# Patient Record
Sex: FEMALE | Race: WHITE | NOT HISPANIC OR LATINO | Employment: OTHER | URBAN - METROPOLITAN AREA
[De-identification: names, ages, dates, MRNs, and addresses within clinical notes are randomized per-mention and may not be internally consistent; named-entity substitution may affect disease eponyms.]

---

## 2017-09-26 ENCOUNTER — APPOINTMENT (OUTPATIENT)
Dept: WOMENS IMAGING | Facility: HOSPITAL | Age: 72
End: 2017-09-26

## 2017-09-26 PROCEDURE — G0202 SCR MAMMO BI INCL CAD: HCPCS | Performed by: RADIOLOGY

## 2017-09-26 PROCEDURE — MDREVIEWSP: Performed by: RADIOLOGY

## 2017-09-26 PROCEDURE — 77063 BREAST TOMOSYNTHESIS BI: CPT | Performed by: RADIOLOGY

## 2017-10-30 ENCOUNTER — HOSPITAL ENCOUNTER (OUTPATIENT)
Dept: CARDIOLOGY | Facility: HOSPITAL | Age: 72
Discharge: HOME | End: 2017-10-30
Attending: INTERNAL MEDICINE | Admitting: INTERNAL MEDICINE

## 2017-11-03 ENCOUNTER — HOSPITAL ENCOUNTER (OUTPATIENT)
Dept: CARDIOLOGY | Facility: HOSPITAL | Age: 72
Discharge: HOME | End: 2017-11-03
Attending: INTERNAL MEDICINE | Admitting: INTERNAL MEDICINE

## 2017-11-22 ENCOUNTER — HOSPITAL ENCOUNTER (OUTPATIENT)
Dept: MAMMOGRAPHY | Facility: HOSPITAL | Age: 72
Discharge: HOME | End: 2017-11-22
Attending: INTERNAL MEDICINE | Admitting: INTERNAL MEDICINE

## 2018-05-03 ENCOUNTER — HOSPITAL ENCOUNTER (OUTPATIENT)
Dept: CARDIOLOGY | Facility: HOSPITAL | Age: 73
Discharge: HOME | End: 2018-05-03
Attending: INTERNAL MEDICINE | Admitting: INTERNAL MEDICINE

## 2018-05-07 ENCOUNTER — HOSPITAL ENCOUNTER (OUTPATIENT)
Dept: PREADMISSION TESTING | Facility: HOSPITAL | Age: 73
Discharge: HOME | End: 2018-05-07
Attending: INTERNAL MEDICINE | Admitting: INTERNAL MEDICINE

## 2018-05-07 LAB
ANION GAP SERPL CALC-SCNC: 12.1 MMOL/L (ref 10–20)
BASOPHILS # BLD AUTO: 0 10*3/UL (ref 0–0.2)
BASOPHILS NFR BLD AUTO: 0 % (ref 0–2)
BUN SERPL-MCNC: 13 MG/DL (ref 8–20)
BUN/CREAT SERPL: 16.3 (ref 5.4–26.2)
CALCIUM SERPL-MCNC: 8.7 MG/DL (ref 8.9–10.3)
CHLORIDE SERPL-SCNC: 102 MMOL/L (ref 101–111)
CONV CO2: 27 MMOL/L (ref 22–32)
CREAT UR-MCNC: 0.8 MG/DL (ref 0.4–1)
DIFFERENTIAL METHOD BLD: (no result)
EOSINOPHIL # BLD AUTO: 0.1 10*3/UL (ref 0–0.3)
EOSINOPHIL # BLD AUTO: 2 % (ref 0–3)
ERYTHROCYTE [DISTWIDTH] IN BLOOD BY AUTOMATED COUNT: 14.2 % (ref 11.5–14.5)
GLUCOSE SERPL-MCNC: 97 MG/DL (ref 65–99)
HCT VFR BLD AUTO: 43 % (ref 35–49)
HGB BLD-MCNC: 14.5 G/DL (ref 12–15)
INR PPP: 1.1
LYMPHOCYTES # BLD AUTO: 1.2 10*3/UL (ref 0.8–4.8)
LYMPHOCYTES NFR BLD AUTO: 24 % (ref 18–42)
MCH RBC QN AUTO: 31.3 PG (ref 26–32)
MCHC RBC AUTO-ENTMCNC: 33.7 G/DL (ref 32–36)
MCV RBC AUTO: 92.9 FL (ref 80–94)
MONOCYTES # BLD AUTO: 0.4 10*3/UL (ref 0.1–1.3)
MONOCYTES NFR BLD AUTO: 7 % (ref 2–11)
NEUTROPHILS # BLD AUTO: 3.3 10*3/UL (ref 2.3–8.6)
NEUTROPHILS NFR BLD AUTO: 67 % (ref 50–75)
NRBC BLD AUTO-RTO: 0 /100{WBCS}
NRBC/RBC NFR BLD MANUAL: 0 10*3/UL
PLATELET # BLD AUTO: 91 10*3/UL (ref 150–450)
PMV BLD AUTO: 11 FL (ref 7.4–10.4)
POTASSIUM SERPL-SCNC: 4.1 MMOL/L (ref 3.6–5.1)
PROTHROMBIN TIME: 11.5 SEC (ref 9.6–11.7)
RBC # BLD AUTO: 4.63 10*6/UL (ref 4–5.4)
SODIUM SERPL-SCNC: 137 MMOL/L (ref 136–144)
WBC # BLD AUTO: 5 10*3/UL (ref 4.5–11.5)

## 2018-08-20 ENCOUNTER — HOSPITAL ENCOUNTER (OUTPATIENT)
Dept: LAB | Facility: HOSPITAL | Age: 73
Discharge: HOME | End: 2018-08-20
Attending: NURSE PRACTITIONER | Admitting: NURSE PRACTITIONER

## 2018-08-20 LAB
ALBUMIN SERPL-MCNC: 4.2 G/DL (ref 3.5–4.8)
ALBUMIN/GLOB SERPL: 1.5 {RATIO} (ref 1–1.7)
ALP SERPL-CCNC: 51 IU/L (ref 32–91)
ALT SERPL-CCNC: 19 IU/L (ref 14–54)
ANION GAP SERPL CALC-SCNC: 13.2 MMOL/L (ref 10–20)
AST SERPL-CCNC: 27 IU/L (ref 15–41)
BILIRUB SERPL-MCNC: 1.1 MG/DL (ref 0.3–1.2)
BUN SERPL-MCNC: 10 MG/DL (ref 8–20)
BUN/CREAT SERPL: 12.5 (ref 5.4–26.2)
C3 SERPL-MCNC: 80 MG/DL (ref 79–152)
C4 SERPL-MCNC: 13 MG/DL (ref 18–55)
CALCIUM SERPL-MCNC: 8.9 MG/DL (ref 8.9–10.3)
CCP IGG ANTIBODIES: <0.4 U/ML
CHLORIDE SERPL-SCNC: 104 MMOL/L (ref 101–111)
CONV CO2: 23 MMOL/L (ref 22–32)
CONV TOTAL PROTEIN: 7 G/DL (ref 6.1–7.9)
CREAT UR-MCNC: 0.8 MG/DL (ref 0.4–1)
CRP SERPL-MCNC: 0.1 MG/DL (ref 0–0.7)
ERYTHROCYTE [DISTWIDTH] IN BLOOD BY AUTOMATED COUNT: 14.2 % (ref 11.5–14.5)
ERYTHROCYTE [SEDIMENTATION RATE] IN BLOOD BY WESTERGREN METHOD: 13 MM/HR (ref 0–30)
GLOBULIN UR ELPH-MCNC: 2.8 G/DL (ref 2.5–3.8)
GLUCOSE SERPL-MCNC: 94 MG/DL (ref 65–99)
HCT VFR BLD AUTO: 41.7 % (ref 35–49)
HGB BLD-MCNC: 14.3 G/DL (ref 12–15)
MCH RBC QN AUTO: 32 PG (ref 26–32)
MCHC RBC AUTO-ENTMCNC: 34.2 G/DL (ref 32–36)
MCV RBC AUTO: 93.3 FL (ref 80–94)
PLATELET # BLD AUTO: 106 10*3/UL (ref 150–450)
PMV BLD AUTO: 10.9 FL (ref 7.4–10.4)
POTASSIUM SERPL-SCNC: 4.2 MMOL/L (ref 3.6–5.1)
RBC # BLD AUTO: 4.47 10*6/UL (ref 4–5.4)
SODIUM SERPL-SCNC: 136 MMOL/L (ref 136–144)
WBC # BLD AUTO: 5.4 10*3/UL (ref 4.5–11.5)

## 2018-08-22 LAB
ANA SER QL IA: NORMAL

## 2018-09-26 ENCOUNTER — HOSPITAL ENCOUNTER (OUTPATIENT)
Dept: CARDIOLOGY | Facility: HOSPITAL | Age: 73
Discharge: HOME | End: 2018-09-26
Attending: NURSE PRACTITIONER | Admitting: NURSE PRACTITIONER

## 2018-10-18 ENCOUNTER — HOSPITAL ENCOUNTER (OUTPATIENT)
Dept: LAB | Facility: HOSPITAL | Age: 73
Discharge: HOME | End: 2018-10-18
Attending: NURSE PRACTITIONER | Admitting: NURSE PRACTITIONER

## 2018-10-18 LAB
ALBUMIN SERPL-MCNC: 4.1 G/DL (ref 3.5–4.8)
ALBUMIN/GLOB SERPL: 1.6 {RATIO} (ref 1–1.7)
ALP SERPL-CCNC: 60 IU/L (ref 32–91)
ALT SERPL-CCNC: 19 IU/L (ref 14–54)
ANION GAP SERPL CALC-SCNC: 11.6 MMOL/L (ref 10–20)
AST SERPL-CCNC: 23 IU/L (ref 15–41)
BASOPHILS # BLD AUTO: 0 10*3/UL (ref 0–0.2)
BASOPHILS NFR BLD AUTO: 0 % (ref 0–2)
BILIRUB SERPL-MCNC: 1.2 MG/DL (ref 0.3–1.2)
BILIRUB UR QL STRIP: NEGATIVE MG/DL
BUN SERPL-MCNC: 11 MG/DL (ref 8–20)
BUN/CREAT SERPL: 13.8 (ref 5.4–26.2)
CALCIUM SERPL-MCNC: 8.8 MG/DL (ref 8.9–10.3)
CASTS URNS QL MICRO: ABNORMAL /[LPF]
CHLORIDE SERPL-SCNC: 101 MMOL/L (ref 101–111)
COLOR UR: ABNORMAL
CONV BACTERIA IN URINE MICRO: NEGATIVE
CONV CLARITY OF URINE: CLEAR
CONV CO2: 29 MMOL/L (ref 22–32)
CONV HYALINE CASTS IN URINE MICRO: 2 /[LPF] (ref 0–5)
CONV PROTEIN IN URINE BY AUTOMATED TEST STRIP: NEGATIVE MG/DL
CONV SMALL ROUND CELLS: ABNORMAL /[HPF]
CONV TOTAL PROTEIN: 6.6 G/DL (ref 6.1–7.9)
CONV UROBILINOGEN IN URINE BY AUTOMATED TEST STRIP: 1 MG/DL
CREAT UR-MCNC: 0.8 MG/DL (ref 0.4–1)
CRP SERPL-MCNC: 0.15 MG/DL (ref 0–0.7)
CULTURE INDICATED?: ABNORMAL
DIFFERENTIAL METHOD BLD: (no result)
EOSINOPHIL # BLD AUTO: 0.2 10*3/UL (ref 0–0.3)
EOSINOPHIL # BLD AUTO: 4 % (ref 0–3)
ERYTHROCYTE [DISTWIDTH] IN BLOOD BY AUTOMATED COUNT: 14.4 % (ref 11.5–14.5)
GLOBULIN UR ELPH-MCNC: 2.5 G/DL (ref 2.5–3.8)
GLUCOSE SERPL-MCNC: 91 MG/DL (ref 65–99)
GLUCOSE UR QL: NEGATIVE MG/DL
HCT VFR BLD AUTO: 41.7 % (ref 35–49)
HGB BLD-MCNC: 13.9 G/DL (ref 12–15)
HGB UR QL STRIP: NEGATIVE
KETONES UR QL STRIP: NEGATIVE MG/DL
LEUKOCYTE ESTERASE UR QL STRIP: NEGATIVE
LYMPHOCYTES # BLD AUTO: 1 10*3/UL (ref 0.8–4.8)
LYMPHOCYTES NFR BLD AUTO: 20 % (ref 18–42)
MCH RBC QN AUTO: 31.3 PG (ref 26–32)
MCHC RBC AUTO-ENTMCNC: 33.3 G/DL (ref 32–36)
MCV RBC AUTO: 94 FL (ref 80–94)
MONOCYTES # BLD AUTO: 0.4 10*3/UL (ref 0.1–1.3)
MONOCYTES NFR BLD AUTO: 8 % (ref 2–11)
NEUTROPHILS # BLD AUTO: 3.5 10*3/UL (ref 2.3–8.6)
NEUTROPHILS NFR BLD AUTO: 68 % (ref 50–75)
NITRITE UR QL STRIP: NEGATIVE
NRBC BLD AUTO-RTO: 0 /100{WBCS}
NRBC/RBC NFR BLD MANUAL: 0 10*3/UL
PH UR STRIP.AUTO: 6.5 [PH] (ref 4.5–8)
PLATELET # BLD AUTO: 98 10*3/UL (ref 150–450)
PMV BLD AUTO: 12 FL (ref 7.4–10.4)
POTASSIUM SERPL-SCNC: 3.6 MMOL/L (ref 3.6–5.1)
RBC # BLD AUTO: 4.44 10*6/UL (ref 4–5.4)
RBC #/AREA URNS HPF: 1 /[HPF] (ref 0–3)
SODIUM SERPL-SCNC: 138 MMOL/L (ref 136–144)
SP GR UR: 1.02 (ref 1–1.03)
SPERM URNS QL MICRO: ABNORMAL /[HPF]
SQUAMOUS SPT QL MICRO: 3 /[HPF] (ref 0–5)
UNIDENT CRYS URNS QL MICRO: ABNORMAL /[HPF]
WBC # BLD AUTO: 5 10*3/UL (ref 4.5–11.5)
WBC #/AREA URNS HPF: 2 /[HPF] (ref 0–5)
YEAST SPEC QL WET PREP: ABNORMAL /[HPF]

## 2018-10-19 LAB
25(OH)D3 SERPL-MCNC: 25 NG/ML (ref 30–100)
C3 SERPL-MCNC: 78 MG/DL (ref 79–152)
C4 SERPL-MCNC: 13.1 MG/DL (ref 18–55)

## 2018-11-07 ENCOUNTER — HOSPITAL ENCOUNTER (OUTPATIENT)
Dept: ONCOLOGY | Facility: CLINIC | Age: 73
Setting detail: INFUSION SERIES
Discharge: HOME | End: 2018-11-07
Attending: INTERNAL MEDICINE | Admitting: INTERNAL MEDICINE

## 2018-11-07 ENCOUNTER — HOSPITAL ENCOUNTER (OUTPATIENT)
Dept: ONCOLOGY | Facility: HOSPITAL | Age: 73
Discharge: HOME | End: 2018-11-07
Attending: INTERNAL MEDICINE | Admitting: INTERNAL MEDICINE

## 2018-11-07 ENCOUNTER — CLINICAL SUPPORT (OUTPATIENT)
Dept: ONCOLOGY | Facility: HOSPITAL | Age: 73
End: 2018-11-07

## 2018-11-07 NOTE — PROGRESS NOTES
PATIENTS ONCOLOGY RECORD LOCATED IN [facility]      Subjective     Name:  LALO DELGADO     Date:  2018  Address:  [facility] [address] [place] [address]  Home: [phone]  :  1945 AGE:  72 y.o.        RECORDS OBTAINED:  Patients Oncology Record is located in [facility]

## 2018-12-24 ENCOUNTER — HOSPITAL ENCOUNTER (OUTPATIENT)
Dept: LAB | Facility: HOSPITAL | Age: 73
Discharge: HOME | End: 2018-12-24
Attending: NURSE PRACTITIONER | Admitting: NURSE PRACTITIONER

## 2018-12-24 LAB
25(OH)D3 SERPL-MCNC: 44 NG/ML (ref 30–100)
ALBUMIN SERPL-MCNC: 4.2 G/DL (ref 3.5–4.8)
ALBUMIN/GLOB SERPL: 1.8 {RATIO} (ref 1–1.7)
ALP SERPL-CCNC: 52 IU/L (ref 32–91)
ALT SERPL-CCNC: 18 IU/L (ref 14–54)
ANION GAP SERPL CALC-SCNC: 12.4 MMOL/L (ref 10–20)
AST SERPL-CCNC: 23 IU/L (ref 15–41)
BASOPHILS # BLD AUTO: 0 10*3/UL (ref 0–0.2)
BASOPHILS NFR BLD AUTO: 1 % (ref 0–2)
BILIRUB SERPL-MCNC: 1 MG/DL (ref 0.3–1.2)
BUN SERPL-MCNC: 11 MG/DL (ref 8–20)
BUN/CREAT SERPL: 13.8 (ref 5.4–26.2)
C3 SERPL-MCNC: 84 MG/DL (ref 79–152)
C4 SERPL-MCNC: 13.5 MG/DL (ref 18–55)
CALCIUM SERPL-MCNC: 8.9 MG/DL (ref 8.9–10.3)
CHLORIDE SERPL-SCNC: 104 MMOL/L (ref 101–111)
CONV CO2: 26 MMOL/L (ref 22–32)
CONV TOTAL PROTEIN: 6.6 G/DL (ref 6.1–7.9)
CREAT UR-MCNC: 0.8 MG/DL (ref 0.4–1)
DIFFERENTIAL METHOD BLD: (no result)
EOSINOPHIL # BLD AUTO: 0.1 10*3/UL (ref 0–0.3)
EOSINOPHIL # BLD AUTO: 3 % (ref 0–3)
ERYTHROCYTE [DISTWIDTH] IN BLOOD BY AUTOMATED COUNT: 14.2 % (ref 11.5–14.5)
GLOBULIN UR ELPH-MCNC: 2.4 G/DL (ref 2.5–3.8)
GLUCOSE SERPL-MCNC: 94 MG/DL (ref 65–99)
HCT VFR BLD AUTO: 43.5 % (ref 35–49)
HGB BLD-MCNC: 14.4 G/DL (ref 12–15)
LYMPHOCYTES # BLD AUTO: 0.8 10*3/UL (ref 0.8–4.8)
LYMPHOCYTES NFR BLD AUTO: 19 % (ref 18–42)
MCH RBC QN AUTO: 31.4 PG (ref 26–32)
MCHC RBC AUTO-ENTMCNC: 33 G/DL (ref 32–36)
MCV RBC AUTO: 95 FL (ref 80–94)
MONOCYTES # BLD AUTO: 0.3 10*3/UL (ref 0.1–1.3)
MONOCYTES NFR BLD AUTO: 8 % (ref 2–11)
NEUTROPHILS # BLD AUTO: 2.8 10*3/UL (ref 2.3–8.6)
NEUTROPHILS NFR BLD AUTO: 69 % (ref 50–75)
NRBC BLD AUTO-RTO: 0 /100{WBCS}
NRBC/RBC NFR BLD MANUAL: 0 10*3/UL
PLATELET # BLD AUTO: 88 10*3/UL (ref 150–450)
PMV BLD AUTO: 11.7 FL (ref 7.4–10.4)
POTASSIUM SERPL-SCNC: 4.4 MMOL/L (ref 3.6–5.1)
RBC # BLD AUTO: 4.58 10*6/UL (ref 4–5.4)
SODIUM SERPL-SCNC: 138 MMOL/L (ref 136–144)
WBC # BLD AUTO: 4 10*3/UL (ref 4.5–11.5)

## 2019-04-24 ENCOUNTER — HOSPITAL ENCOUNTER (OUTPATIENT)
Dept: LAB | Facility: HOSPITAL | Age: 74
Discharge: HOME | End: 2019-04-24
Attending: NURSE PRACTITIONER | Admitting: NURSE PRACTITIONER

## 2019-04-24 LAB
ALBUMIN SERPL-MCNC: 4.1 G/DL (ref 3.5–4.8)
ALBUMIN SERPL-MCNC: 4.2 G/DL (ref 3.5–4.8)
ALBUMIN/GLOB SERPL: 1.5 {RATIO} (ref 1–1.7)
ALP SERPL-CCNC: 65 IU/L (ref 32–91)
ALPHA1 GLOB FLD ELPH-MCNC: 0.2 GM/DL (ref 0.1–0.4)
ALPHA2 GLOB SERPL ELPH-MCNC: 0.7 GM/DL (ref 0.5–1)
ALT SERPL-CCNC: 21 IU/L (ref 14–54)
ANION GAP SERPL CALC-SCNC: 11.8 MMOL/L (ref 10–20)
AST SERPL-CCNC: 25 IU/L (ref 15–41)
B-GLOBULIN SERPL ELPH-MCNC: 0.9 GM/DL (ref 0.7–1.4)
BASOPHILS # BLD AUTO: 0 10*3/UL (ref 0–0.2)
BASOPHILS NFR BLD AUTO: 0 % (ref 0–2)
BILIRUB SERPL-MCNC: 1.2 MG/DL (ref 0.3–1.2)
BILIRUB UR QL STRIP: ABNORMAL
BILIRUB UR QL STRIP: ABNORMAL MG/DL
BUN SERPL-MCNC: 10 MG/DL (ref 8–20)
BUN/CREAT SERPL: 12.5 (ref 5.4–26.2)
C3 SERPL-MCNC: 96 MG/DL (ref 79–152)
C4 SERPL-MCNC: 14.5 MG/DL (ref 18–55)
CALCIUM SERPL-MCNC: 9.2 MG/DL (ref 8.9–10.3)
CASTS URNS QL MICRO: ABNORMAL /[LPF]
CHLORIDE SERPL-SCNC: 104 MMOL/L (ref 101–111)
COLOR UR: ABNORMAL
CONV BACTERIA IN URINE MICRO: NEGATIVE
CONV CLARITY OF URINE: CLEAR
CONV CO2: 26 MMOL/L (ref 22–32)
CONV HYALINE CASTS IN URINE MICRO: 2 /[LPF] (ref 0–5)
CONV PROTEIN IN URINE BY AUTOMATED TEST STRIP: NEGATIVE MG/DL
CONV SMALL ROUND CELLS: ABNORMAL /[HPF]
CONV TOTAL PROTEIN: 6.9 G/DL (ref 6.1–7.9)
CONV TOTAL PROTEIN: 7 G/DL (ref 6.1–7.9)
CONV UROBILINOGEN IN URINE BY AUTOMATED TEST STRIP: 1 MG/DL
CREAT UR-MCNC: 0.8 MG/DL (ref 0.4–1)
CRP SERPL-MCNC: 0.18 MG/DL (ref 0–0.7)
CULTURE INDICATED?: ABNORMAL
DIFFERENTIAL METHOD BLD: (no result)
EOSINOPHIL # BLD AUTO: 0.2 10*3/UL (ref 0–0.3)
EOSINOPHIL # BLD AUTO: 4 % (ref 0–3)
ERYTHROCYTE [DISTWIDTH] IN BLOOD BY AUTOMATED COUNT: 13.7 % (ref 11.5–14.5)
ERYTHROCYTE [SEDIMENTATION RATE] IN BLOOD BY WESTERGREN METHOD: 15 MM/HR (ref 0–30)
GAMMA GLOB SERPL ELPH-MCNC: 1 GM/DL (ref 0.6–1.6)
GLOBULIN UR ELPH-MCNC: 2.8 G/DL (ref 2.5–3.8)
GLUCOSE SERPL-MCNC: 94 MG/DL (ref 65–99)
GLUCOSE UR QL: NEGATIVE MG/DL
HCT VFR BLD AUTO: 43.1 % (ref 35–49)
HGB BLD-MCNC: 14.5 G/DL (ref 12–15)
HGB UR QL STRIP: NEGATIVE
INSULIN SERPL-ACNC: NORMAL U[IU]/ML
KETONES UR QL STRIP: NEGATIVE MG/DL
LEUKOCYTE ESTERASE UR QL STRIP: ABNORMAL
LYMPHOCYTES # BLD AUTO: 0.8 10*3/UL (ref 0.8–4.8)
LYMPHOCYTES NFR BLD AUTO: 17 % (ref 18–42)
MCH RBC QN AUTO: 30.8 PG (ref 26–32)
MCHC RBC AUTO-ENTMCNC: 33.6 G/DL (ref 32–36)
MCV RBC AUTO: 91.7 FL (ref 80–94)
MONOCYTES # BLD AUTO: 0.4 10*3/UL (ref 0.1–1.3)
MONOCYTES NFR BLD AUTO: 7 % (ref 2–11)
NEUTROPHILS # BLD AUTO: 3.6 10*3/UL (ref 2.3–8.6)
NEUTROPHILS NFR BLD AUTO: 72 % (ref 50–75)
NITRITE UR QL STRIP: NEGATIVE
NRBC BLD AUTO-RTO: 0 /100{WBCS}
NRBC/RBC NFR BLD MANUAL: 0 10*3/UL
PH UR STRIP.AUTO: 6 [PH] (ref 4.5–8)
PLATELET # BLD AUTO: 115 10*3/UL (ref 150–450)
PMV BLD AUTO: 10.5 FL (ref 7.4–10.4)
POTASSIUM SERPL-SCNC: 3.8 MMOL/L (ref 3.6–5.1)
RBC # BLD AUTO: 4.7 10*6/UL (ref 4–5.4)
RBC #/AREA URNS HPF: 2 /[HPF] (ref 0–3)
SODIUM SERPL-SCNC: 138 MMOL/L (ref 136–144)
SP GR UR: 1.02 (ref 1–1.03)
SPERM URNS QL MICRO: ABNORMAL /[HPF]
SQUAMOUS SPT QL MICRO: 4 /[HPF] (ref 0–5)
UNIDENT CRYS URNS QL MICRO: ABNORMAL /[HPF]
WBC # BLD AUTO: 5 10*3/UL (ref 4.5–11.5)
WBC #/AREA URNS HPF: 2 /[HPF] (ref 0–5)
YEAST SPEC QL WET PREP: ABNORMAL /[HPF]

## 2019-05-08 ENCOUNTER — CLINICAL SUPPORT (OUTPATIENT)
Dept: ONCOLOGY | Facility: HOSPITAL | Age: 74
End: 2019-05-08

## 2019-05-08 ENCOUNTER — HOSPITAL ENCOUNTER (OUTPATIENT)
Dept: ONCOLOGY | Facility: CLINIC | Age: 74
Setting detail: INFUSION SERIES
Discharge: HOME | End: 2019-05-08
Attending: INTERNAL MEDICINE | Admitting: INTERNAL MEDICINE

## 2019-05-08 NOTE — PROGRESS NOTES
PATIENTS ONCOLOGY RECORD LOCATED IN [place]      Subjective     Name:  LALO DELGADO     Date:  2019  Address:  [facility] [place] [address]  Home: [unfilled]  :  1945 AGE:  73 y.o.        RECORDS OBTAINED:  Patients Oncology Record is located in [place]

## 2019-06-24 ENCOUNTER — TELEPHONE (OUTPATIENT)
Dept: CARDIOLOGY | Facility: CLINIC | Age: 74
End: 2019-06-24

## 2019-06-24 NOTE — TELEPHONE ENCOUNTER
Raven calling back. Advised pt was to hold ASA since on Eliquis 5mg bid and Plavix. She said renal function is normal.

## 2019-06-24 NOTE — TELEPHONE ENCOUNTER
"Raven from Dr Us, PCP office called. Wants to \"make sure its okay pt is on ASA and Eliquis 2.5mg bid\" confirming meds.   Raven wants call back at [phone]   "

## 2019-08-16 PROBLEM — L30.9 DERMATITIS: Status: ACTIVE | Noted: 2018-08-20

## 2019-08-16 PROBLEM — R30.0 DIFFICULT OR PAINFUL URINATION: Status: ACTIVE | Noted: 2019-04-24

## 2019-08-16 PROBLEM — F32.A DEPRESSIVE DISORDER: Status: ACTIVE | Noted: 2019-08-16

## 2019-08-16 PROBLEM — M35.00 SJOGREN'S SYNDROME: Status: ACTIVE | Noted: 2017-04-07

## 2019-08-16 PROBLEM — R42 DIZZINESS: Status: ACTIVE | Noted: 2017-10-24

## 2019-08-16 PROBLEM — I49.1 PREMATURE ATRIAL CONTRACTION: Status: ACTIVE | Noted: 2018-10-08

## 2019-08-16 PROBLEM — E55.9 VITAMIN D DEFICIENCY: Status: ACTIVE | Noted: 2018-10-18

## 2019-08-16 PROBLEM — E03.9 HYPOTHYROIDISM: Status: ACTIVE | Noted: 2019-08-16

## 2019-08-16 PROBLEM — M81.0 OSTEOPOROSIS: Status: ACTIVE | Noted: 2017-10-16

## 2019-08-16 PROBLEM — Z98.61 STATUS POST PERCUTANEOUS TRANSLUMINAL CORONARY ANGIOPLASTY: Status: ACTIVE | Noted: 2019-08-16

## 2019-08-16 PROBLEM — M32.9 SYSTEMIC LUPUS ERYTHEMATOSUS (HCC): Status: ACTIVE | Noted: 2019-08-16

## 2019-08-16 PROBLEM — R53.83 FATIGUE: Status: ACTIVE | Noted: 2019-08-16

## 2019-08-16 PROBLEM — E66.9 OBESITY: Status: ACTIVE | Noted: 2019-08-16

## 2019-08-16 PROBLEM — R58 ECCHYMOSIS: Status: ACTIVE | Noted: 2018-05-15

## 2019-08-16 PROBLEM — M79.7 FIBROMYALGIA: Status: ACTIVE | Noted: 2019-04-24

## 2019-08-16 PROBLEM — I73.00 RAYNAUD'S PHENOMENON: Status: ACTIVE | Noted: 2018-10-16

## 2019-08-16 PROBLEM — E78.5 HYPERLIPIDEMIA: Status: ACTIVE | Noted: 2019-08-16

## 2019-08-16 PROBLEM — I10 HYPERTENSION: Status: ACTIVE | Noted: 2019-08-16

## 2019-08-16 PROBLEM — D69.6 THROMBOCYTOPENIA: Status: ACTIVE | Noted: 2018-10-18

## 2019-08-16 PROBLEM — M85.80 OSTEOPENIA: Status: ACTIVE | Noted: 2018-06-27

## 2019-08-16 PROBLEM — Z79.01 LONG TERM CURRENT USE OF ANTICOAGULANT THERAPY: Status: ACTIVE | Noted: 2019-05-07

## 2019-08-16 PROBLEM — R94.39 ABNORMAL CARDIOVASCULAR STRESS TEST: Status: ACTIVE | Noted: 2018-05-03

## 2019-08-16 PROBLEM — I73.00 RAYNAUD'S PHENOMENON: Status: ACTIVE | Noted: 2017-04-07

## 2019-08-16 PROBLEM — I25.10 CORONARY ARTERIOSCLEROSIS IN NATIVE ARTERY: Status: ACTIVE | Noted: 2019-08-16

## 2019-08-16 PROBLEM — G47.30 SLEEP APNEA: Status: ACTIVE | Noted: 2019-08-16

## 2019-08-16 PROBLEM — I48.0 PAROXYSMAL ATRIAL FIBRILLATION (HCC): Status: ACTIVE | Noted: 2019-05-07

## 2019-08-16 PROBLEM — M70.62 TROCHANTERIC BURSITIS OF LEFT HIP: Status: ACTIVE | Noted: 2018-12-24

## 2019-08-16 PROBLEM — E66.3 OVERWEIGHT: Status: ACTIVE | Noted: 2019-08-16

## 2019-08-16 RX ORDER — METHOTREXATE 2.5 MG/1
TABLET ORAL
COMMUNITY
End: 2019-12-03 | Stop reason: ALTCHOICE

## 2019-08-16 RX ORDER — PANTOPRAZOLE SODIUM 40 MG/1
TABLET, DELAYED RELEASE ORAL
COMMUNITY
Start: 2019-02-06 | End: 2020-07-06

## 2019-08-16 RX ORDER — PREDNISONE 20 MG/1
TABLET ORAL
COMMUNITY
End: 2019-12-03 | Stop reason: ALTCHOICE

## 2019-08-16 RX ORDER — PAROXETINE HYDROCHLORIDE 20 MG/1
TABLET, FILM COATED ORAL
COMMUNITY
Start: 2013-06-15 | End: 2019-12-03 | Stop reason: ALTCHOICE

## 2019-08-16 RX ORDER — GABAPENTIN 100 MG/1
CAPSULE ORAL
COMMUNITY
Start: 2018-06-27 | End: 2021-07-06

## 2019-08-16 RX ORDER — ISOSORBIDE DINITRATE 30 MG/1
TABLET ORAL
COMMUNITY
End: 2020-07-06 | Stop reason: SDUPTHER

## 2019-08-16 RX ORDER — DILTIAZEM HYDROCHLORIDE 120 MG/1
1 CAPSULE, COATED, EXTENDED RELEASE ORAL EVERY 24 HOURS
COMMUNITY
Start: 2019-02-20 | End: 2019-12-03 | Stop reason: SDUPTHER

## 2019-08-16 RX ORDER — UREA 10 %
800 LOTION (ML) TOPICAL DAILY
COMMUNITY
Start: 2016-04-26

## 2019-08-16 RX ORDER — ATORVASTATIN CALCIUM 10 MG/1
TABLET, FILM COATED ORAL
COMMUNITY
End: 2019-12-03 | Stop reason: SDUPTHER

## 2019-08-16 RX ORDER — SOLIFENACIN SUCCINATE 10 MG/1
TABLET, FILM COATED ORAL
COMMUNITY
End: 2019-12-03 | Stop reason: ALTCHOICE

## 2019-08-16 RX ORDER — ROSUVASTATIN CALCIUM 5 MG/1
5 TABLET, COATED ORAL DAILY
COMMUNITY
Start: 2015-04-28

## 2019-08-16 RX ORDER — AMLODIPINE BESYLATE 10 MG/1
TABLET ORAL
COMMUNITY
End: 2019-12-03 | Stop reason: SDUPTHER

## 2019-08-16 RX ORDER — HYDROXYCHLOROQUINE SULFATE 200 MG/1
200 TABLET, FILM COATED ORAL DAILY
COMMUNITY
Start: 2018-06-27

## 2019-08-16 RX ORDER — POTASSIUM CHLORIDE 600 MG/1
TABLET, FILM COATED, EXTENDED RELEASE ORAL
COMMUNITY
End: 2019-12-03

## 2019-08-16 RX ORDER — LEVOTHYROXINE SODIUM 137 UG/1
137 TABLET ORAL DAILY
COMMUNITY
End: 2021-08-22 | Stop reason: HOSPADM

## 2019-08-16 RX ORDER — METOPROLOL SUCCINATE 50 MG/1
TABLET, EXTENDED RELEASE ORAL
COMMUNITY
End: 2019-12-03

## 2019-08-16 RX ORDER — PRAVASTATIN SODIUM 20 MG
TABLET ORAL
COMMUNITY
End: 2019-12-03 | Stop reason: SDUPTHER

## 2019-08-16 RX ORDER — ISOSORBIDE DINITRATE 10 MG/1
TABLET ORAL
COMMUNITY
End: 2019-12-03

## 2019-08-16 RX ORDER — CLOBETASOL PROPIONATE 0.5 MG/G
OINTMENT TOPICAL
COMMUNITY
End: 2019-12-03 | Stop reason: ALTCHOICE

## 2019-08-16 RX ORDER — ISOSORBIDE MONONITRATE 30 MG/1
30 TABLET, EXTENDED RELEASE ORAL DAILY
COMMUNITY
Start: 2018-04-17 | End: 2021-08-22 | Stop reason: HOSPADM

## 2019-08-16 RX ORDER — CLOPIDOGREL BISULFATE 75 MG/1
TABLET ORAL
COMMUNITY
Start: 2019-02-20 | End: 2019-12-03

## 2019-08-16 RX ORDER — FESOTERODINE FUMARATE 8 MG/1
TABLET, EXTENDED RELEASE ORAL
COMMUNITY
End: 2020-07-06

## 2019-08-16 RX ORDER — ASPIRIN 81 MG/1
TABLET, CHEWABLE ORAL
COMMUNITY
End: 2019-12-03 | Stop reason: SDUPTHER

## 2019-08-19 ENCOUNTER — OFFICE VISIT (OUTPATIENT)
Dept: RHEUMATOLOGY | Facility: CLINIC | Age: 74
End: 2019-08-19

## 2019-08-19 ENCOUNTER — LAB (OUTPATIENT)
Dept: LAB | Facility: HOSPITAL | Age: 74
End: 2019-08-19

## 2019-08-19 VITALS
BODY MASS INDEX: 38.4 KG/M2 | DIASTOLIC BLOOD PRESSURE: 86 MMHG | HEIGHT: 62 IN | WEIGHT: 208.69 LBS | SYSTOLIC BLOOD PRESSURE: 120 MMHG | HEART RATE: 77 BPM

## 2019-08-19 DIAGNOSIS — M35.01 SJOGREN'S SYNDROME WITH KERATOCONJUNCTIVITIS SICCA (HCC): ICD-10-CM

## 2019-08-19 DIAGNOSIS — M19.90 INFLAMMATORY ARTHRITIS: ICD-10-CM

## 2019-08-19 DIAGNOSIS — E55.9 VITAMIN D DEFICIENCY: ICD-10-CM

## 2019-08-19 DIAGNOSIS — R76.8 POSITIVE ANA (ANTINUCLEAR ANTIBODY): ICD-10-CM

## 2019-08-19 DIAGNOSIS — M19.90 INFLAMMATORY ARTHRITIS: Primary | ICD-10-CM

## 2019-08-19 DIAGNOSIS — D69.6 THROMBOCYTOPENIA (HCC): ICD-10-CM

## 2019-08-19 DIAGNOSIS — I73.00 RAYNAUD'S PHENOMENON WITHOUT GANGRENE: ICD-10-CM

## 2019-08-19 LAB
25(OH)D3 SERPL-MCNC: 24.2 NG/ML (ref 30–100)
ALBUMIN SERPL-MCNC: 4 G/DL (ref 3.5–4.8)
ALBUMIN/GLOB SERPL: 2 G/DL (ref 1–1.7)
ALP SERPL-CCNC: 59 U/L (ref 32–91)
ALT SERPL W P-5'-P-CCNC: 18 U/L (ref 14–54)
ANION GAP SERPL CALCULATED.3IONS-SCNC: 13.3 MMOL/L (ref 5–15)
AST SERPL-CCNC: 22 U/L (ref 15–41)
BASOPHILS # BLD AUTO: 0 10*3/MM3 (ref 0–0.2)
BASOPHILS NFR BLD AUTO: 0.3 % (ref 0–1.5)
BILIRUB SERPL-MCNC: 1 MG/DL (ref 0.3–1.2)
BILIRUB UR QL STRIP: NEGATIVE
BUN BLD-MCNC: 12 MG/DL (ref 8–20)
BUN/CREAT SERPL: 15 (ref 5.4–26.2)
CALCIUM SPEC-SCNC: 8.9 MG/DL (ref 8.9–10.3)
CHLORIDE SERPL-SCNC: 103 MMOL/L (ref 101–111)
CLARITY UR: CLEAR
CO2 SERPL-SCNC: 27 MMOL/L (ref 22–32)
COLOR UR: YELLOW
CREAT BLD-MCNC: 0.8 MG/DL (ref 0.4–1)
CRP SERPL-MCNC: 0.13 MG/DL (ref 0–0.7)
DEPRECATED RDW RBC AUTO: 48.6 FL (ref 37–54)
EOSINOPHIL # BLD AUTO: 0.1 10*3/MM3 (ref 0–0.4)
EOSINOPHIL NFR BLD AUTO: 3.3 % (ref 0.3–6.2)
ERYTHROCYTE [DISTWIDTH] IN BLOOD BY AUTOMATED COUNT: 15.1 % (ref 12.3–15.4)
GFR SERPL CREATININE-BSD FRML MDRD: 70 ML/MIN/1.73
GLOBULIN UR ELPH-MCNC: 2 GM/DL (ref 2.5–3.8)
GLUCOSE BLD-MCNC: 78 MG/DL (ref 65–99)
GLUCOSE UR STRIP-MCNC: NEGATIVE MG/DL
HCT VFR BLD AUTO: 40.1 % (ref 34–46.6)
HGB BLD-MCNC: 13.6 G/DL (ref 12–15.9)
HGB UR QL STRIP.AUTO: NEGATIVE
KETONES UR QL STRIP: NEGATIVE
LEUKOCYTE ESTERASE UR QL STRIP.AUTO: NEGATIVE
LYMPHOCYTES # BLD AUTO: 0.8 10*3/MM3 (ref 0.7–3.1)
LYMPHOCYTES NFR BLD AUTO: 17.4 % (ref 19.6–45.3)
MCH RBC QN AUTO: 31 PG (ref 26.6–33)
MCHC RBC AUTO-ENTMCNC: 34 G/DL (ref 31.5–35.7)
MCV RBC AUTO: 91 FL (ref 79–97)
MONOCYTES # BLD AUTO: 0.4 10*3/MM3 (ref 0.1–0.9)
MONOCYTES NFR BLD AUTO: 8.2 % (ref 5–12)
NEUTROPHILS # BLD AUTO: 3.2 10*3/MM3 (ref 1.7–7)
NEUTROPHILS NFR BLD AUTO: 70.8 % (ref 42.7–76)
NITRITE UR QL STRIP: NEGATIVE
PH UR STRIP.AUTO: 6 [PH] (ref 5–8)
PLATELET # BLD AUTO: 91 10*3/MM3 (ref 140–450)
PMV BLD AUTO: 10.8 FL (ref 6–12)
POTASSIUM BLD-SCNC: 4.3 MMOL/L (ref 3.6–5.1)
PROT SERPL-MCNC: 6 G/DL (ref 6.1–7.9)
PROT UR QL STRIP: NEGATIVE
RBC # BLD AUTO: 4.4 10*6/MM3 (ref 3.77–5.28)
SODIUM BLD-SCNC: 139 MMOL/L (ref 136–144)
SP GR UR STRIP: 1.02 (ref 1–1.03)
UROBILINOGEN UR QL STRIP: ABNORMAL
WBC NRBC COR # BLD: 4.5 10*3/MM3 (ref 3.4–10.8)

## 2019-08-19 PROCEDURE — 99214 OFFICE O/P EST MOD 30 MIN: CPT | Performed by: NURSE PRACTITIONER

## 2019-08-19 PROCEDURE — 86160 COMPLEMENT ANTIGEN: CPT | Performed by: NURSE PRACTITIONER

## 2019-08-19 PROCEDURE — 86140 C-REACTIVE PROTEIN: CPT | Performed by: NURSE PRACTITIONER

## 2019-08-19 PROCEDURE — 85027 COMPLETE CBC AUTOMATED: CPT | Performed by: NURSE PRACTITIONER

## 2019-08-19 PROCEDURE — 81003 URINALYSIS AUTO W/O SCOPE: CPT | Performed by: NURSE PRACTITIONER

## 2019-08-19 PROCEDURE — 80053 COMPREHEN METABOLIC PANEL: CPT | Performed by: NURSE PRACTITIONER

## 2019-08-19 PROCEDURE — 36415 COLL VENOUS BLD VENIPUNCTURE: CPT

## 2019-08-19 PROCEDURE — 82306 VITAMIN D 25 HYDROXY: CPT | Performed by: NURSE PRACTITIONER

## 2019-08-19 RX ORDER — CYANOCOBALAMIN 1000 UG/ML
1000 INJECTION, SOLUTION INTRAMUSCULAR; SUBCUTANEOUS
Refills: 4 | COMMUNITY
Start: 2019-08-11

## 2019-08-19 NOTE — PROGRESS NOTES
Subjective   Ally Ivory is a 73 y.o. female.     History of Present Illness   Pt is a 73 y.o. female pt with history of +SELENA, psoriasis, raynauds, sjogrens. She is here for follow up today. She was last seen in the office in April 2019.     She is taking plaquenil 1 tab/day and tolerates well, no rashes. Last eye examination was 6 months ago & ok for plaquneil.  AM stiffness lasts 30 about 30 minutes. Her right hip has been bothering her. Experiences pain w/ ambulation. Pain does not radiate to the groin. She did see ortho & had a bursa injection. Helped. Her hands will swell at times, but overall is able to do her daily functions without difficulty.     Reivew of systems: Denies fever/chills, no nasal or oral lesions. + dry eyes/mouth.  Uses gtts as needed. NO CP + SOA a ttimes. Pt has COPD & is followed by pulmnologist. She denies intermittent diarrhea. Denies N/V. Denies inflammatory bowel disease.  Has thyroid disease and PCP manages. Has raynauds, but currently stable w/ warmer weather. The remainder of the review of systems reviewed and are (-)    Follows cardiology for the CAD & CHF.  .  [facility] of [facility]- [facility]  Labs in April showed low platelet count, mildly low C4, normal C3, normal ES& CR            The following portions of the patient's history were reviewed and updated as appropriate: allergies, current medications, past family history, past medical history, past social history, past surgical history and problem list.    Review of Systems   Constitutional:        See HPI       Objective   Physical Exam   Constitutional: She is oriented to person, place, and time. She appears well-developed and well-nourished.   HENT:   Head: Normocephalic and atraumatic.   Nose: Nose normal.   Eyes: EOM are normal. Pupils are equal, round, and reactive to light.   Cardiovascular: Regular rhythm.   Pulmonary/Chest: Effort normal and breath sounds normal.   Musculoskeletal:    Decreased ROM of the bilateral hips, lumbar spine  She has mild tenderness over the lumbar spine, ninfa knees  No swelling or synovitis is noted on exam.   Neurological: She is alert and oriented to person, place, and time.         Assessment/Plan   Ally was seen today for lupus.    Diagnoses and all orders for this visit:    Inflammatory arthritis  Comments:  Stable. Continue w/ current therapy  Labs today  Orders:  -     CBC With Manual Differential; Future  -     Comprehensive Metabolic Panel; Future  -     C4 Complement; Future  -     C3 Complement; Future  -     Urinalysis With Culture If Indicated -; Future  -     C-reactive Protein; Future  -     Vitamin D 25 Hydroxy; Future    Sjogren's syndrome with keratoconjunctivitis sicca (CMS/HCC)  Comments:  May continue to use eye gtts  Discussed ways to help keep mouth moist: sugar free candy, biotene, drink plenty of water.  Orders:  -     CBC With Manual Differential; Future  -     Comprehensive Metabolic Panel; Future  -     C4 Complement; Future  -     C3 Complement; Future  -     Urinalysis With Culture If Indicated -; Future  -     C-reactive Protein; Future  -     Vitamin D 25 Hydroxy; Future    Positive SELENA (antinuclear antibody)  Comments:  Continue w/ current therapy  Labs today.   Orders:  -     CBC With Manual Differential; Future  -     Comprehensive Metabolic Panel; Future  -     C4 Complement; Future  -     C3 Complement; Future  -     Urinalysis With Culture If Indicated -; Future  -     C-reactive Protein; Future  -     Vitamin D 25 Hydroxy; Future    Thrombocytopenia (CMS/HCC)  -     CBC With Manual Differential; Future  -     Comprehensive Metabolic Panel; Future  -     C4 Complement; Future  -     C3 Complement; Future  -     Urinalysis With Culture If Indicated -; Future  -     C-reactive Protein; Future  -     Vitamin D 25 Hydroxy; Future    Vitamin D deficiency   -     Vitamin D 25 Hydroxy; Future        Patient Instructions   Labs  today  Stable  Continue ith cureent therapy  RTO 4-5 months

## 2019-08-19 NOTE — PATIENT INSTRUCTIONS
Stable. Continue with current therapy  Discussed the importance of eye examination with plaquenil use. Pt verbalizes understanding.   Labs today  RTO 4-5 months

## 2019-08-21 LAB
C3 SERPL-MCNC: 85 MG/DL (ref 79–152)
C4 SERPL-MCNC: 15 MG/DL (ref 18–55)

## 2019-11-22 ENCOUNTER — LAB (OUTPATIENT)
Dept: FAMILY MEDICINE CLINIC | Facility: CLINIC | Age: 74
End: 2019-11-22

## 2019-11-22 DIAGNOSIS — E78.5 HYPERLIPIDEMIA, UNSPECIFIED HYPERLIPIDEMIA TYPE: Primary | ICD-10-CM

## 2019-11-22 DIAGNOSIS — M79.7 FIBROMYALGIA: ICD-10-CM

## 2019-11-22 DIAGNOSIS — I10 HYPERTENSION, UNSPECIFIED TYPE: ICD-10-CM

## 2019-11-22 LAB
ALBUMIN SERPL-MCNC: 4.6 G/DL (ref 3.5–5.2)
ALBUMIN/GLOB SERPL: 1.7 G/DL
ALP SERPL-CCNC: 77 U/L (ref 39–117)
ALT SERPL W P-5'-P-CCNC: 18 U/L (ref 1–33)
ANION GAP SERPL CALCULATED.3IONS-SCNC: 9.3 MMOL/L (ref 5–15)
AST SERPL-CCNC: 24 U/L (ref 1–32)
BILIRUB SERPL-MCNC: 0.8 MG/DL (ref 0.2–1.2)
BUN BLD-MCNC: 13 MG/DL (ref 8–23)
BUN/CREAT SERPL: 16.3 (ref 7–25)
CALCIUM SPEC-SCNC: 9.1 MG/DL (ref 8.6–10.5)
CHLORIDE SERPL-SCNC: 103 MMOL/L (ref 98–107)
CHOLEST SERPL-MCNC: 191 MG/DL (ref 0–200)
CK SERPL-CCNC: 147 U/L (ref 20–180)
CO2 SERPL-SCNC: 30.7 MMOL/L (ref 22–29)
CREAT BLD-MCNC: 0.8 MG/DL (ref 0.57–1)
GFR SERPL CREATININE-BSD FRML MDRD: 70 ML/MIN/1.73
GLOBULIN UR ELPH-MCNC: 2.7 GM/DL
GLUCOSE BLD-MCNC: 93 MG/DL (ref 65–99)
HDLC SERPL-MCNC: 40 MG/DL (ref 40–60)
LDLC SERPL CALC-MCNC: 121 MG/DL (ref 0–100)
LDLC/HDLC SERPL: 3.04 {RATIO}
POTASSIUM BLD-SCNC: 4.7 MMOL/L (ref 3.5–5.2)
PROT SERPL-MCNC: 7.3 G/DL (ref 6–8.5)
SODIUM BLD-SCNC: 143 MMOL/L (ref 136–145)
TRIGL SERPL-MCNC: 148 MG/DL (ref 0–150)
VLDLC SERPL-MCNC: 29.6 MG/DL (ref 5–40)

## 2019-11-22 PROCEDURE — 80061 LIPID PANEL: CPT | Performed by: INTERNAL MEDICINE

## 2019-11-22 PROCEDURE — 36415 COLL VENOUS BLD VENIPUNCTURE: CPT | Performed by: INTERNAL MEDICINE

## 2019-11-22 PROCEDURE — 82550 ASSAY OF CK (CPK): CPT | Performed by: INTERNAL MEDICINE

## 2019-11-22 PROCEDURE — 80053 COMPREHEN METABOLIC PANEL: CPT | Performed by: INTERNAL MEDICINE

## 2019-12-03 ENCOUNTER — OFFICE VISIT (OUTPATIENT)
Dept: CARDIOLOGY | Facility: CLINIC | Age: 74
End: 2019-12-03

## 2019-12-03 VITALS
WEIGHT: 208 LBS | BODY MASS INDEX: 34.66 KG/M2 | DIASTOLIC BLOOD PRESSURE: 80 MMHG | HEART RATE: 68 BPM | OXYGEN SATURATION: 97 % | HEIGHT: 65 IN | SYSTOLIC BLOOD PRESSURE: 144 MMHG

## 2019-12-03 DIAGNOSIS — I48.0 PAROXYSMAL ATRIAL FIBRILLATION (HCC): ICD-10-CM

## 2019-12-03 DIAGNOSIS — I50.32 CHRONIC HEART FAILURE WITH PRESERVED EJECTION FRACTION (HCC): ICD-10-CM

## 2019-12-03 DIAGNOSIS — I25.10 CAD S/P PERCUTANEOUS CORONARY ANGIOPLASTY: ICD-10-CM

## 2019-12-03 DIAGNOSIS — I35.1 NONRHEUMATIC AORTIC VALVE INSUFFICIENCY: ICD-10-CM

## 2019-12-03 DIAGNOSIS — Z98.61 CAD S/P PERCUTANEOUS CORONARY ANGIOPLASTY: ICD-10-CM

## 2019-12-03 DIAGNOSIS — E78.5 DYSLIPIDEMIA: ICD-10-CM

## 2019-12-03 DIAGNOSIS — R53.83 FATIGUE, UNSPECIFIED TYPE: Primary | ICD-10-CM

## 2019-12-03 DIAGNOSIS — I34.0 NONRHEUMATIC MITRAL VALVE REGURGITATION: ICD-10-CM

## 2019-12-03 DIAGNOSIS — I10 ESSENTIAL HYPERTENSION: ICD-10-CM

## 2019-12-03 DIAGNOSIS — Z79.01 LONG TERM (CURRENT) USE OF ANTICOAGULANTS: ICD-10-CM

## 2019-12-03 PROCEDURE — 99214 OFFICE O/P EST MOD 30 MIN: CPT | Performed by: INTERNAL MEDICINE

## 2019-12-03 PROCEDURE — 93000 ELECTROCARDIOGRAM COMPLETE: CPT | Performed by: INTERNAL MEDICINE

## 2019-12-03 RX ORDER — ASPIRIN 81 MG/1
81 TABLET, CHEWABLE ORAL DAILY
Qty: 90 TABLET | Refills: 5 | Status: SHIPPED | OUTPATIENT
Start: 2019-12-03 | End: 2021-07-06

## 2019-12-03 RX ORDER — DILTIAZEM HYDROCHLORIDE 120 MG/1
240 CAPSULE, COATED, EXTENDED RELEASE ORAL EVERY 24 HOURS
Qty: 90 CAPSULE | Refills: 5 | Status: SHIPPED | OUTPATIENT
Start: 2019-12-03 | End: 2020-12-01

## 2019-12-03 NOTE — PROGRESS NOTES
Subjective:     Encounter Date:12/03/2019      Patient ID: Ally Ivory is a 73 y.o. female.    Chief Complaint: Follow-up for CAD, PCI, paroxysmal A. fib, long-term anticoagulation  History of Present Illness     This is a 73-year-old with PMH of    #.CAD, JUAN , cath 5/9/18 Severe disease in OM1 branch of LCX,FFR RCA 0.97  Patent stent in ostial right with moderate InStent stenosis and noncritical FFR at 0.90  Elevated  LVEDP with normal LV systolic function. cath  01/21/2019 PTCA to instent  RCA  #. CAROLYN-TACHY SYNDROME with  P. A.FIB  , long-term anticoagulation  #   dyslipidemia,statin allergy  #  hypertension, positive family history, obesity, hypothyroidism.  #  history of lupus, thrombocytopenia, pernicious anemia.  psoriasis, Raynaud's, Sjogren's  #   cholecystectomy  #.  moderate MR and -moderate AI, last echo 20 60  #  type 2 diabetes       Here for   followup. patient  was recently in hospital in [place] in January had PTCA to RCA had AFib with RVR, has been started on Eliquis. patient denies any   chest pain, shortness of breath at rest,loss of consciousness.  Is complaining of fatigue after activity sometimes   Patient  had labs done with PMD Dr. Vasquez. and reportedly sugars are running good denies any hypoglycemia episodes. patient had labs done 08/20/2018 with rheumatology CMP was unremarkable.  Labs done 11/22/2019 revealed normal CMP, CK, cholesterol of 191, HDL 40  triglycerides 148  Patient's arterial blood pressure is 144/80, heart rate 68       ASSESSMENT:  #Fatigue  #Dyslipidemia  #   paroxysmal atrial fibrillation, long-term anticoagulation  # . chronic CHF due to diastolic dysfunction with normal LV systolic function in the past  #.   CAD ,PCI  #.  hypertension, dyslipidemia  #.   carolyn-tachy syndrome , s/p ILR  #  PVC  #.  moderate MR and-moderate AI     PLAN:  Patient's fatigue after activity could be from A. fib with RVR will increase Cardizem to 240 mg daily  Reviewed  EKG and lab results with patient  Will discontinue Plavix in January will be 1 year since her PCI and restart baby aspirin and continue Eliquis,risk benefits alternatives explained    reviewed extensive records  from [place]   Will hold off on beta-blockers due to  history of Bradycardia   continue long-acting nitrates , risk benefits alternatives explained   counseled on walking exercise for low HDL, continue Crestor   Will check lipid profile CMP CK, BNP level before next visit.      Past Medical History:  Past Medical History:   Diagnosis Date   • Lupus      Past Surgical History:  No past surgical history on file.   Allergies:  Allergies   Allergen Reactions   • Pravastatin Rash     rash     • Vancomycin Unknown (See Comments)   • Rosuvastatin Calcium GI Intolerance and Diarrhea     Home Meds:  Current Meds:     Current Outpatient Medications:   •  apixaban (ELIQUIS) 5 MG tablet tablet, ELIQUIS 5 MG TABS, Disp: , Rfl:   •  Cholecalciferol (VITAMIN D3) 36936 units tablet, Every 7 (Seven) Days., Disp: , Rfl:   •  clopidogrel (PLAVIX) 75 MG tablet, CLOPIDOGREL BISULFATE 75 MG TABS, Disp: , Rfl:   •  cyanocobalamin 1000 MCG/ML injection, Inject 1,000 mcg into the appropriate muscle as directed by prescriber Every 30 (Thirty) Days., Disp: , Rfl: 4  •  diltiaZEM CD (CARDIZEM CD) 120 MG 24 hr capsule, 1 capsule Daily., Disp: , Rfl:   •  folic acid (FOLVITE) 1 MG tablet, FOLIC ACID 1 MG TABS, Disp: , Rfl:   •  gabapentin (NEURONTIN) 100 MG capsule, GABAPENTIN 100 MG CAPS, Disp: , Rfl:   •  hydroxychloroquine (PLAQUENIL) 200 MG tablet, hydroxychloroquine 200 mg tablet  TAKE 1 TABLET BY MOUTH TWICE A DAY, Disp: , Rfl:   •  isosorbide mononitrate (IMDUR) 30 MG 24 hr tablet, isosorbide mononitrate ER 30 mg tablet,extended release 24 hr  Take 1 tablet every day by oral route., Disp: , Rfl:   •  levothyroxine (SYNTHROID, LEVOTHROID) 137 MCG tablet, TAKE 1 TABLET BY MOUTH EVERY DAY, Disp: , Rfl:   •  pantoprazole (PROTONIX) 40  MG EC tablet, PANTOPRAZOLE SODIUM 40 MG TBEC, Disp: , Rfl:   •  rosuvastatin (CRESTOR) 5 MG tablet, rosuvastatin 5 mg tablet  TAKE 1 TABLET BY MOUTH EVERY DAY, Disp: , Rfl:   •  amLODIPine (NORVASC) 10 MG tablet, amlodipine 10 mg tablet  TAKE 1 TABLET BY MOUTH EVERY DAY, Disp: , Rfl:   •  aspirin (ASPIRIN 81) 81 MG chewable tablet, Aspir-81 81 mg tablet,delayed release  Take 1 tablet every day by oral route for 30 days., Disp: , Rfl:   •  atorvastatin (LIPITOR) 10 MG tablet, atorvastatin 10 mg tablet  Take 1 tablet every day by oral route for 30 days., Disp: , Rfl:   •  clobetasol (TEMOVATE) 0.05 % ointment, clobetasol 0.05 % topical ointment, Disp: , Rfl:   •  fesoterodine fumarate (TOVIAZ) 8 MG tablet sustained-release 24 hour tablet, Toviaz 8 mg tablet,extended release, Disp: , Rfl:   •  isosorbide dinitrate (ISORDIL) 10 MG tablet, isosorbide dinitrate 10 mg tablet, Disp: , Rfl:   •  isosorbide dinitrate (ISORDIL) 30 MG tablet, isosorbide dinitrate 30 mg tablet  Take 1 tablet every day by oral route., Disp: , Rfl:   •  methotrexate 2.5 MG tablet, methotrexate sodium 2.5 mg tablet, Disp: , Rfl:   •  metoprolol succinate XL (TOPROL-XL) 50 MG 24 hr tablet, metoprolol succinate ER 50 mg tablet,extended release 24 hr, Disp: , Rfl:   •  PARoxetine (PAXIL) 20 MG tablet, paroxetine 20 mg tablet  TAKE 1 TABLET BY MOUTH EVERY DAY, Disp: , Rfl:   •  potassium chloride (KLOR-CON) 8 MEQ CR tablet, Klor-Con 8 mEq tablet,extended release, Disp: , Rfl:   •  pravastatin (PRAVACHOL) 20 MG tablet, pravastatin 20 mg tablet, Disp: , Rfl:   •  predniSONE (DELTASONE) 20 MG tablet, prednisone 20 mg tablet, Disp: , Rfl:   •  solifenacin (VESICARE) 10 MG tablet, Vesicare 10 mg tablet, Disp: , Rfl:   •  zoster vaccine live (ZOSTAVAX) 94397 UNT/0.65ML reconstituted suspension, Zostavax (PF) 19,400 unit/0.65 mL subcutaneous suspension, Disp: , Rfl:   Social History:   Social History     Tobacco Use   • Smoking status: Never Smoker   •  "Smokeless tobacco: Never Used   Substance Use Topics   • Alcohol use: Not on file      Family History:  No family history on file.     The following portions of the patient's history were reviewed and updated as appropriate: allergies, current medications, past family history, past medical history, past social history, past surgical history and problem list.    Review of Systems   Constitution: Positive for malaise/fatigue.   Cardiovascular: Negative for chest pain, leg swelling and palpitations.   Respiratory: Negative for shortness of breath.    Skin: Negative for rash.   Neurological: Positive for dizziness. Negative for light-headedness and numbness.         ECG 12 Lead  Date/Time: 12/3/2019 12:05 PM  Performed by: Eric Low MD  Authorized by: Eric Low MD   Comparison: compared with previous ECG from 4/17/2018  Similar to previous ECG  Comparison to previous ECG: EKG done today reviewed by me shows sinus rhythm with rate of 68 bpm with low QRS voltage, no new change compared to 4/17/2018                 Objective:     Physical Exam  /80   Pulse 68   Ht 165.1 cm (65\")   Wt 94.3 kg (208 lb)   SpO2 97%   BMI 34.61 kg/m²   General:  Appears in no acute distress  Eyes: Sclera is anicteric,  conjunctiva is clear   HEENT:  No JVD. Thyroid not visibly enlarged. No mucosal pallor or cyanosis  Respiratory: Respirations regular and unlabored at rest.  Bilaterally good breath sounds, with good air entry in all fields. No crackles, rubs or wheezes auscultated  Cardiovascular: S1,S2 Regular rate and rhythm. No murmur, rub or gallop auscultated. No pretibial pitting edema  Gastrointestinal: Abdomen soft, flat, non tender. Bowel sounds present.   Musculoskeletal:  No abnormal movements  Extremities: No digital clubbing or cyanosis  Skin: Color pink. Skin warm and dry to touch. No rashes  No xanthoma  Neuro: Alert and awake, no lateralizing deficits appreciated    Lab Review:     "   Assessment:         No diagnosis found.       Plan:

## 2019-12-10 ENCOUNTER — LAB (OUTPATIENT)
Dept: LAB | Facility: HOSPITAL | Age: 74
End: 2019-12-10

## 2019-12-10 ENCOUNTER — OFFICE VISIT (OUTPATIENT)
Dept: RHEUMATOLOGY | Facility: CLINIC | Age: 74
End: 2019-12-10

## 2019-12-10 ENCOUNTER — LAB REQUISITION (OUTPATIENT)
Dept: LAB | Facility: HOSPITAL | Age: 74
End: 2019-12-10

## 2019-12-10 VITALS
WEIGHT: 213 LBS | BODY MASS INDEX: 39.2 KG/M2 | HEART RATE: 86 BPM | HEIGHT: 62 IN | DIASTOLIC BLOOD PRESSURE: 74 MMHG | SYSTOLIC BLOOD PRESSURE: 118 MMHG

## 2019-12-10 DIAGNOSIS — R76.8 POSITIVE ANA (ANTINUCLEAR ANTIBODY): ICD-10-CM

## 2019-12-10 DIAGNOSIS — M35.01 SJOGREN'S SYNDROME WITH KERATOCONJUNCTIVITIS SICCA (HCC): ICD-10-CM

## 2019-12-10 DIAGNOSIS — R76.8 POSITIVE ANA (ANTINUCLEAR ANTIBODY): Primary | ICD-10-CM

## 2019-12-10 DIAGNOSIS — M19.90 INFLAMMATORY ARTHRITIS: ICD-10-CM

## 2019-12-10 DIAGNOSIS — D89.89 OTHER SPECIFIED DISORDERS INVOLVING THE IMMUNE MECHANISM, NOT ELSEWHERE CLASSIFIED (HCC): ICD-10-CM

## 2019-12-10 DIAGNOSIS — E55.9 VITAMIN D DEFICIENCY: ICD-10-CM

## 2019-12-10 DIAGNOSIS — M89.9 DISORDER OF BONE, UNSPECIFIED: ICD-10-CM

## 2019-12-10 DIAGNOSIS — D69.6 THROMBOCYTOPENIA (HCC): ICD-10-CM

## 2019-12-10 LAB
25(OH)D3 SERPL-MCNC: 24.5 NG/ML (ref 30–100)
ALBUMIN SERPL-MCNC: 4.3 G/DL (ref 3.5–5.2)
ALBUMIN/GLOB SERPL: 1.5 G/DL
ALP SERPL-CCNC: 77 U/L (ref 39–117)
ALT SERPL W P-5'-P-CCNC: 16 U/L (ref 1–33)
ANION GAP SERPL CALCULATED.3IONS-SCNC: 10.2 MMOL/L (ref 5–15)
AST SERPL-CCNC: 21 U/L (ref 1–32)
BASOPHILS # BLD AUTO: 0.01 10*3/MM3 (ref 0–0.2)
BASOPHILS NFR BLD AUTO: 0.2 % (ref 0–1.5)
BILIRUB SERPL-MCNC: 0.8 MG/DL (ref 0.2–1.2)
BILIRUB UR QL STRIP: NEGATIVE
BUN BLD-MCNC: 9 MG/DL (ref 8–23)
BUN/CREAT SERPL: 11.7 (ref 7–25)
CALCIUM SPEC-SCNC: 9.2 MG/DL (ref 8.6–10.5)
CHLORIDE SERPL-SCNC: 105 MMOL/L (ref 98–107)
CLARITY UR: CLEAR
CO2 SERPL-SCNC: 28.8 MMOL/L (ref 22–29)
COLOR UR: YELLOW
CREAT BLD-MCNC: 0.77 MG/DL (ref 0.57–1)
CRP SERPL-MCNC: 0.16 MG/DL (ref 0–0.5)
DEPRECATED RDW RBC AUTO: 44.5 FL (ref 37–54)
EOSINOPHIL # BLD AUTO: 0.14 10*3/MM3 (ref 0–0.4)
EOSINOPHIL NFR BLD AUTO: 2.8 % (ref 0.3–6.2)
ERYTHROCYTE [DISTWIDTH] IN BLOOD BY AUTOMATED COUNT: 13.1 % (ref 12.3–15.4)
ERYTHROCYTE [SEDIMENTATION RATE] IN BLOOD: 5 MM/HR (ref 0–30)
GFR SERPL CREATININE-BSD FRML MDRD: 73 ML/MIN/1.73
GLOBULIN UR ELPH-MCNC: 2.8 GM/DL
GLUCOSE BLD-MCNC: 99 MG/DL (ref 65–99)
GLUCOSE UR STRIP-MCNC: NEGATIVE MG/DL
HCT VFR BLD AUTO: 40 % (ref 34–46.6)
HGB BLD-MCNC: 13.9 G/DL (ref 12–15.9)
HGB UR QL STRIP.AUTO: NEGATIVE
IMM GRANULOCYTES # BLD AUTO: 0.02 10*3/MM3 (ref 0–0.05)
IMM GRANULOCYTES NFR BLD AUTO: 0.4 % (ref 0–0.5)
KETONES UR QL STRIP: NEGATIVE
LEUKOCYTE ESTERASE UR QL STRIP.AUTO: NEGATIVE
LYMPHOCYTES # BLD AUTO: 0.76 10*3/MM3 (ref 0.7–3.1)
LYMPHOCYTES NFR BLD AUTO: 15.3 % (ref 19.6–45.3)
MCH RBC QN AUTO: 32 PG (ref 26.6–33)
MCHC RBC AUTO-ENTMCNC: 34.8 G/DL (ref 31.5–35.7)
MCV RBC AUTO: 92 FL (ref 79–97)
MONOCYTES # BLD AUTO: 0.29 10*3/MM3 (ref 0.1–0.9)
MONOCYTES NFR BLD AUTO: 5.8 % (ref 5–12)
NEUTROPHILS # BLD AUTO: 3.74 10*3/MM3 (ref 1.7–7)
NEUTROPHILS NFR BLD AUTO: 75.5 % (ref 42.7–76)
NITRITE UR QL STRIP: NEGATIVE
NRBC BLD AUTO-RTO: 0 /100 WBC (ref 0–0.2)
PH UR STRIP.AUTO: 7 [PH] (ref 5–8)
PLATELET # BLD AUTO: 92 10*3/MM3 (ref 140–450)
PMV BLD AUTO: 12.7 FL (ref 6–12)
POTASSIUM BLD-SCNC: 4.6 MMOL/L (ref 3.5–5.2)
PROT SERPL-MCNC: 7.1 G/DL (ref 6–8.5)
PROT UR QL STRIP: NEGATIVE
RBC # BLD AUTO: 4.35 10*6/MM3 (ref 3.77–5.28)
SODIUM BLD-SCNC: 144 MMOL/L (ref 136–145)
SP GR UR STRIP: 1.02 (ref 1–1.03)
UROBILINOGEN UR QL STRIP: NORMAL
WBC NRBC COR # BLD: 4.96 10*3/MM3 (ref 3.4–10.8)

## 2019-12-10 PROCEDURE — 82306 VITAMIN D 25 HYDROXY: CPT

## 2019-12-10 PROCEDURE — 85025 COMPLETE CBC W/AUTO DIFF WBC: CPT

## 2019-12-10 PROCEDURE — 85652 RBC SED RATE AUTOMATED: CPT

## 2019-12-10 PROCEDURE — 36415 COLL VENOUS BLD VENIPUNCTURE: CPT

## 2019-12-10 PROCEDURE — 36415 COLL VENOUS BLD VENIPUNCTURE: CPT | Performed by: NURSE PRACTITIONER

## 2019-12-10 PROCEDURE — 81003 URINALYSIS AUTO W/O SCOPE: CPT

## 2019-12-10 PROCEDURE — 80053 COMPREHEN METABOLIC PANEL: CPT

## 2019-12-10 PROCEDURE — 86140 C-REACTIVE PROTEIN: CPT

## 2019-12-10 PROCEDURE — 99214 OFFICE O/P EST MOD 30 MIN: CPT | Performed by: NURSE PRACTITIONER

## 2019-12-10 RX ORDER — SOLIFENACIN SUCCINATE 10 MG/1
TABLET, FILM COATED ORAL
COMMUNITY
End: 2020-07-06

## 2019-12-10 RX ORDER — CLOBETASOL PROPIONATE 0.5 MG/G
OINTMENT TOPICAL
COMMUNITY
End: 2020-07-06

## 2019-12-10 RX ORDER — POTASSIUM CHLORIDE 600 MG/1
TABLET, FILM COATED, EXTENDED RELEASE ORAL
COMMUNITY
End: 2021-07-06

## 2019-12-10 RX ORDER — METOPROLOL SUCCINATE 50 MG/1
TABLET, EXTENDED RELEASE ORAL
COMMUNITY
End: 2020-07-06

## 2019-12-10 RX ORDER — PAROXETINE HYDROCHLORIDE 20 MG/1
TABLET, FILM COATED ORAL
COMMUNITY
End: 2020-12-01 | Stop reason: SDUPTHER

## 2019-12-10 RX ORDER — ISOSORBIDE DINITRATE 10 MG/1
TABLET ORAL
COMMUNITY
End: 2020-07-06 | Stop reason: SDUPTHER

## 2019-12-10 RX ORDER — PREDNISONE 20 MG/1
TABLET ORAL
COMMUNITY
End: 2020-07-06

## 2019-12-10 RX ORDER — CLOPIDOGREL BISULFATE 75 MG/1
TABLET ORAL
COMMUNITY
End: 2020-07-06

## 2019-12-10 RX ORDER — PRAVASTATIN SODIUM 20 MG
TABLET ORAL
COMMUNITY
End: 2020-07-06

## 2019-12-10 NOTE — PATIENT INSTRUCTIONS
Pt is due labs today. + AVISE  Continue plaquenil 200 mg/d  Discussed the importance of eye examination with plaquenil use. Pt verbalizes understanding.  Dexa order given.   RTO 3-4 months or sooner if needed

## 2019-12-10 NOTE — PROGRESS NOTES
"Subjective   Patient ID: Ally is a 73 y.o. female    Pt is a 73 y.o. female here for follow up today for the management of  +SELENA, psoriasis, raynauds, sjogrens. She is here for follow up today. She was last seen in the office in August 2019. Labs in August 2019 showed thrombocytopenia w/ platelet count at 91. Her C4 was mildly low at 15 (18-55) urine was (-) for blood or protein. Vit D low at 24. CRP was normal        She is taking plaquenil 1 tab/day and tolerates well, no rashes. Last eye examination was 6 months ago & ok for plaquenil.    AM stiffness lasts about 30 minutes. She will have intermittent ache & pain in various joints. Her Rt hip is better since seeing ortho & having injection for bursitis. Her hands & ankles will swell at times, but overall is able to do her daily functions without difficulty. She does have fluid retention & has diuretic to take PRN.      Reivew of systems: Denies fever/chills, no nasal or oral lesions. + dry eyes/mouth.  Uses gtts as needed. NO CP + SOA a ttimes. Pt has COPD & is followed by pulmnologist. She denies intermittent diarrhea depending on her diet.  No psoriasis at this time.Denies N/V. Denies inflammatory bowel disease.  Has thyroid disease and PCP manages. Has raynauds, but currently stable w/ warmer weather. Energy level is \"good\" The remainder of the review of systems reviewed and are (-)     Follows cardiology for the CAD & CHF.  .  [facility] of [facility]    Dexa scan 11/2017 was normal.          Chief Complaint   Patient presents with   • Joint Pain     doing good some bad days     History of Present Illness    The following portions of the patient's history were reviewed and updated as appropriate: allergies, current medications, past family history, past medical history, past social history, past surgical history and problem list.      Review of Systems    Physical Exam   Constitutional: She is oriented to person, place, " and time. She appears well-developed and well-nourished.   HENT:   Head: Normocephalic.   Eyes: Pupils are equal, round, and reactive to light. Conjunctivae are normal.   Cardiovascular: Normal rate and regular rhythm.   Pulmonary/Chest: Effort normal and breath sounds normal.   Musculoskeletal:   Mild decreased ROM over the l-spine & ninfa hips  No swelling on exam  Mild tenderness over the ninfa PIPs & ninfa ankles. She has fluid retention 1+ edema to the BLEs.   Neurological: She is alert and oriented to person, place, and time.   Skin: Skin is warm and dry.   Psychiatric: She has a normal mood and affect.   Vitals reviewed.        Objective   Patient Global Assessment: Not documented  Provider Global Assessment: Not documented  ESR: Not documented  CRP: Not documented  JOYCE-28 (ESR): Not documented  JOYCE-28 (CRP): Not documented  Joint Exam     Not documented     There is currently no information documented on the homunculus. Go to the Rheumatology activity and complete the homunculus joint exam.        Assessment/Plan      Ally was seen today for joint pain.    Diagnoses and all orders for this visit:    Positive SELENA (antinuclear antibody)  Comments:  On plaquenil 200 mg/d  Continue therapy  Labs today + AVISE  Orders:  -     DEXA Bone Density Axial; Future  -     CBC & Differential; Future  -     Comprehensive Metabolic Panel; Future  -     C-reactive Protein; Future  -     Vitamin D 25 Hydroxy; Future  -     Urinalysis With Culture If Indicated -; Future  -     AVISE Testing; Future  -     Sedimentation Rate; Future    Inflammatory arthritis  Comments:  Stable, continue plaquenil 200 mg/d, tylenol PRN  paraffin information given for hands & feet to help w/ arthralgia  Orders:  -     DEXA Bone Density Axial; Future  -     CBC & Differential; Future  -     Comprehensive Metabolic Panel; Future  -     C-reactive Protein; Future  -     Vitamin D 25 Hydroxy; Future  -     Urinalysis With Culture If Indicated -; Future  -      AVISE Testing; Future  -     Sedimentation Rate; Future    Sjogren's syndrome with keratoconjunctivitis sicca (CMS/[facility])  Comments:  Continue w/ eye gtts.    Thrombocytopenia (CMS/[facility])  Comments:  Under the care of hematology--continues to be monitored.    Vitamin D deficiency   Comments:  PT reports she is not sure if she takes vit D regularly??  Check level today & will let pt know  Orders:  -     Vitamin D 25 Hydroxy; Future    Disorder of bone, unspecified   Comments:  Repeat dexa is due  Orders:  -     DEXA Bone Density Axial; Future

## 2019-12-17 ENCOUNTER — TELEPHONE (OUTPATIENT)
Dept: RHEUMATOLOGY | Facility: CLINIC | Age: 74
End: 2019-12-17

## 2019-12-17 NOTE — TELEPHONE ENCOUNTER
----- Message from ERVIN Meadows sent at 12/15/2019  2:58 PM EST -----  Vitamin D is 24.5. Be sure to take at least 2000 IU once daily of the vitamin D. Platelet count is 92. CRP & ESR normal. Pt may continue therapy as discussed during her OV.

## 2020-07-06 ENCOUNTER — OFFICE VISIT (OUTPATIENT)
Dept: ONCOLOGY | Facility: CLINIC | Age: 75
End: 2020-07-06

## 2020-07-06 ENCOUNTER — LAB (OUTPATIENT)
Dept: LAB | Facility: HOSPITAL | Age: 75
End: 2020-07-06

## 2020-07-06 VITALS
WEIGHT: 210.8 LBS | HEART RATE: 64 BPM | RESPIRATION RATE: 16 BRPM | TEMPERATURE: 97.8 F | DIASTOLIC BLOOD PRESSURE: 53 MMHG | SYSTOLIC BLOOD PRESSURE: 132 MMHG | BODY MASS INDEX: 38.79 KG/M2 | HEIGHT: 62 IN

## 2020-07-06 DIAGNOSIS — D51.0 PERNICIOUS ANEMIA: Primary | ICD-10-CM

## 2020-07-06 DIAGNOSIS — D69.6 THROMBOCYTOPENIA (HCC): ICD-10-CM

## 2020-07-06 LAB
BACTERIA UR QL AUTO: ABNORMAL /HPF
BASOPHILS # BLD AUTO: 0.01 10*3/MM3 (ref 0–0.2)
BASOPHILS NFR BLD AUTO: 0.2 % (ref 0–1.5)
BILIRUB UR QL STRIP: NEGATIVE
CLARITY UR: CLEAR
COLOR UR: YELLOW
DEPRECATED RDW RBC AUTO: 44.8 FL (ref 37–54)
EOSINOPHIL # BLD AUTO: 0.21 10*3/MM3 (ref 0–0.4)
EOSINOPHIL NFR BLD AUTO: 3.9 % (ref 0.3–6.2)
ERYTHROCYTE [DISTWIDTH] IN BLOOD BY AUTOMATED COUNT: 13.6 % (ref 12.3–15.4)
GLUCOSE UR STRIP-MCNC: NEGATIVE MG/DL
HCT VFR BLD AUTO: 40.7 % (ref 34–46.6)
HGB BLD-MCNC: 13.6 G/DL (ref 12–15.9)
HGB UR QL STRIP.AUTO: NEGATIVE
HYALINE CASTS UR QL AUTO: ABNORMAL /LPF
KETONES UR QL STRIP: NEGATIVE
LEUKOCYTE ESTERASE UR QL STRIP.AUTO: ABNORMAL
LYMPHOCYTES # BLD AUTO: 1.01 10*3/MM3 (ref 0.7–3.1)
LYMPHOCYTES NFR BLD AUTO: 18.8 % (ref 19.6–45.3)
MCH RBC QN AUTO: 31.1 PG (ref 26.6–33)
MCHC RBC AUTO-ENTMCNC: 33.4 G/DL (ref 31.5–35.7)
MCV RBC AUTO: 93.1 FL (ref 79–97)
MONOCYTES # BLD AUTO: 0.5 10*3/MM3 (ref 0.1–0.9)
MONOCYTES NFR BLD AUTO: 9.3 % (ref 5–12)
NEUTROPHILS NFR BLD AUTO: 3.63 10*3/MM3 (ref 1.7–7)
NEUTROPHILS NFR BLD AUTO: 67.8 % (ref 42.7–76)
NITRITE UR QL STRIP: NEGATIVE
PH UR STRIP.AUTO: 5.5 [PH] (ref 5–8)
PLATELET # BLD AUTO: 89 10*3/MM3 (ref 140–450)
PMV BLD AUTO: 12.3 FL (ref 6–12)
PROT UR QL STRIP: NEGATIVE
RBC # BLD AUTO: 4.37 10*6/MM3 (ref 3.77–5.28)
RBC # UR: ABNORMAL /HPF
REF LAB TEST METHOD: ABNORMAL
SP GR UR STRIP: >=1.03 (ref 1–1.03)
SQUAMOUS #/AREA URNS HPF: ABNORMAL /HPF
UROBILINOGEN UR QL STRIP: ABNORMAL
WBC # BLD AUTO: 5.36 10*3/MM3 (ref 3.4–10.8)
WBC UR QL AUTO: ABNORMAL /HPF

## 2020-07-06 PROCEDURE — 81001 URINALYSIS AUTO W/SCOPE: CPT | Performed by: INTERNAL MEDICINE

## 2020-07-06 PROCEDURE — 36415 COLL VENOUS BLD VENIPUNCTURE: CPT | Performed by: INTERNAL MEDICINE

## 2020-07-06 PROCEDURE — 85025 COMPLETE CBC W/AUTO DIFF WBC: CPT | Performed by: INTERNAL MEDICINE

## 2020-07-06 PROCEDURE — 99214 OFFICE O/P EST MOD 30 MIN: CPT | Performed by: INTERNAL MEDICINE

## 2020-07-06 NOTE — PROGRESS NOTES
HEMATOLOGY ONCOLOGY FOLLOW UP        Patient name: Ally Ivory  : 1945  MRN: [id number]  Primary Care Physician: Gael Vasquez MD  Referring Physician: Gael Vasquez MD  Chief complaint:  Thrombocytopenia    History of Present Illness:  Ms. Ivory is a 73-year-old female who has a history of coronary artery disease for which she underwent cardiac stenting on 3/5/13 and presented to [facility] on 13 for elective cholecystectomy with Dr. Newberry.  Pre-op lab work was performed that showed thrombocytopenia with platelet count of 74,000.  Previous CBC from 13 showed a platelet count of 91,000.  On both occasions hemoglobin was normal.  She also had a normal white blood cell count.  The patient had normal liver enzymes and bilirubin on 13.  She was unaware of prior thrombocytopenia.  She did report excessive bruising at the site of her IV blood draws, but otherwise, she denied any gum bleeding, nose bleeding, vaginal or GI bleeding.  She was on aspirin and Plavix for her coronary artery disease and stents.      The patient reported having a blood clot in her leg roughly four years ago in .  The blood clot was unprovoked and she ended up on anticoagulation for sometime and then it was discontinued.  She also reported her brother also had a blood clot and it appeared he had pulmonary embolism and .  No known thrombophilia disorder in family members.  The patient denied any autoimmune disorders, personally or in the family.   • 13 - CBC repeated in our office revealed platelet count of 99,000, WBC 4.3, hemoglobin 12.5, MCV 92.3.  • 13 - Hexagonal phase confirmation weakly positive.  PTT lupus anticoagulant screen 49 seconds (H).  Haptoglobin 145.  DRVVT screen 41 seconds (N).  Thrombin clotting time 18 seconds (N).  Serum SELENA positive, double-stranded antibody 13, high.  Vitamin B12 249 (L).  TSH 1.22.  Folate 20.4.  .  • 13 -  Patient underwent laparoscopy cholecystectomy successfully.  Surgical pathology showed chronic cholecystitis.  Postoperative course was complicated by pneumonia and atrial fibrillation with rapid ventricular response.    • 8/15/13 - CT scan of the chest:  Showed mild splenomegaly at 14 cm.    • 8/16/13 - WBC 2.7, hemoglobin 10.6, platelet count 46,000 (postoperative).    • 8/21/13 - Parietal cell antibody screen positive.  Parietal cell antibody titer 1:160 (H).   • 8/26/13 - Patient started on B12 injections.    • 9/6/13 - DRVVT screen (N).  PTT lupus anticoagulant screen 39 seconds.  Lupus anticoagulant not detected.    • 9/24/13 - SELENA screen positive, double-stranded antibody 10 (H).  Platelet count 95,000.  • 12/4/13 - WBC 4.4, hemoglobin 13.8, platelet count 95,000.  • 4/2/14 - WBC 4.2, hemoglobin 12.7, platelet count 93,000, MCV 94.6.   • 10/1/14 - WBC 4.1, hemoglobin 12.3, platelet count 83,000, MCV 95.0.  Patient started on a 5-day trial of Prednisone.    • 10/6/14 - WBC 5.7, hemoglobin 14.0, platelet count 112,000.  • 10/24/14 - Patient underwent right knee replacement.  She received a unit of platelet transfusion prior to surgery.  She tolerated the surgery well.   • 4/8/15 - WBC 3.8, hemoglobin 12.6, platelet count 91,000, MCV 90.6.   • April 2015 to November 2018 - Patient has not followed up in our office.    • 10/18/18 - WBC 5, hemoglobin 13.9, platelet count 98,000, MCV 94.  Complement C3 78 (L), complement C4 13.1 (L).  Creatinine 0.8.  LFTs are normal.  Vitamin D 25 (L).    • 11/7/18 - Patient was referred back to our office of evaluation of thrombocytopenia.    • 5/8/19 - WBC 4.9, hemoglobin 14.4, platelet count 100,000, MCV 90.6.    • 4/24/19 - T3 96 (N), T4 14.5 (L).  Creatinine 0.8.  LFTs are normal.  SPEP normal.  Vitamin D 44   • 12/10/2019: ESR 5, UA normal, 25-hydroxy vitamin D 24.5 low, CRP 0.16 normal, LFTs normal, creatinine 0.77,, WBC 4.96, hemoglobin 13.9, platelets 92 low,  • 7/6/2020:  WBC 5.3, hemoglobin 13.6, platelets 89, MCV 93.1,        Subjective:  The patient presents to the office for follow-up regarding anemia. She is currently taking Eliquis for atrial fibrillation, she had a recent stent placed. She is on Plaquenil for her lupus, and states she has just started seeing [facility]. She has been having trouble administering B-12 injections. She states it is hard for her to draw the medication in the syringe.   She would like to get some help receiving B12 shots.  She does have excess bruising being on anticoagulation.        The following portions of the patient's history were reviewed and updated as appropriate: allergies, current medications, past family history, past medical history, past social history, past surgical history and problem list.        Past Medical History:   Diagnosis Date   • Lupus (CMS/HCC)        History reviewed. No pertinent surgical history.      Current Outpatient Medications:   •  apixaban (ELIQUIS) 5 MG tablet tablet, ELIQUIS 5 MG TABS, Disp: , Rfl:   •  aspirin (ASPIRIN 81) 81 MG chewable tablet, Chew 1 tablet Daily., Disp: 90 tablet, Rfl: 5  •  dilTIAZem CD (CARDIZEM CD) 120 MG 24 hr capsule, Take 2 capsules by mouth Daily., Disp: 90 capsule, Rfl: 5  •  folic acid (FOLVITE) 1 MG tablet, FOLIC ACID 1 MG TABS, Disp: , Rfl:   •  hydroxychloroquine (PLAQUENIL) 200 MG tablet, hydroxychloroquine 200 mg tablet  TAKE 1 TABLET BY MOUTH TWICE A DAY, Disp: , Rfl:   •  isosorbide mononitrate (IMDUR) 30 MG 24 hr tablet, isosorbide mononitrate ER 30 mg tablet,extended release 24 hr  Take 1 tablet every day by oral route., Disp: , Rfl:   •  levothyroxine (SYNTHROID, LEVOTHROID) 137 MCG tablet, TAKE 1 TABLET BY MOUTH EVERY DAY, Disp: , Rfl:   •  PARoxetine (PAXIL) 20 MG tablet, paroxetine 20 mg tablet, Disp: , Rfl:   •  rosuvastatin (CRESTOR) 5 MG tablet, rosuvastatin 5 mg tablet  TAKE 1 TABLET BY MOUTH EVERY DAY, Disp: , Rfl:   •  Cholecalciferol (VITAMIN D3) 01822  units tablet, Every 7 (Seven) Days., Disp: , Rfl:   •  cyanocobalamin 1000 MCG/ML injection, Inject 1,000 mcg into the appropriate muscle as directed by prescriber Every 30 (Thirty) Days., Disp: , Rfl: 4  •  gabapentin (NEURONTIN) 100 MG capsule, GABAPENTIN 100 MG CAPS, Disp: , Rfl:   •  potassium chloride (KLOR-CON) 8 MEQ CR tablet, Klor-Con 8 mEq tablet,extended release, Disp: , Rfl:     Allergies   Allergen Reactions   • Pravastatin Rash     rash     • Rosuvastatin Calcium Diarrhea and GI Intolerance   • Vancomycin Hives       History reviewed. No pertinent family history.    Cancer-related family history is not on file.    Social History     Tobacco Use   • Smoking status: Never Smoker   • Smokeless tobacco: Never Used   Substance Use Topics   • Alcohol use: Not Currently   • Drug use: Never     Social History     Social History Narrative   • Not on file        I have reviewed the history of present illness, past medical history, family history, social history, lab results, all notes and other records since the patient was last seen on  Visit date not found.    ROS:   Review of Systems   Constitutional: Negative for chills and fever.   HENT: Negative for ear pain, mouth sores, nosebleeds and sore throat.    Eyes: Negative for photophobia and visual disturbance.   Respiratory: Negative for wheezing and stridor.    Cardiovascular: Negative for chest pain and palpitations.   Gastrointestinal: Negative for abdominal pain, diarrhea, nausea and vomiting.   Endocrine: Negative for cold intolerance and heat intolerance.   Genitourinary: Negative for dysuria and hematuria.   Musculoskeletal: Negative for joint swelling and neck stiffness.   Skin: Negative for color change and rash.   Neurological: Negative for seizures and syncope.   Hematological: Negative for adenopathy. Bruises/bleeds easily.        No obvious bleeding   Psychiatric/Behavioral: Negative for agitation, confusion and hallucinations.           I have  "reviewed and confirmed the accuracy of the patient's history: HPI as entered by the MA/LPN/RN. Deshawn Walters MD 07/06/20     Objective:  Vital signs:  Vitals:    07/06/20 1030   BP: 132/53   Pulse: 64   Resp: 16   Temp: 97.8 °F (36.6 °C)   Weight: 95.6 kg (210 lb 12.8 oz)   Height: 157.5 cm (62\")   PainSc: 0-No pain     Body mass index is 38.56 kg/m².  ECOG  (1) Restricted in physically strenuous activity, ambulatory and able to do work of light nature    Physical Exam:   Physical Exam   Constitutional: She is oriented to person, place, and time. She appears well-developed and well-nourished. No distress.   Obese female   HENT:   Head: Normocephalic and atraumatic.   Eyes: Conjunctivae and EOM are normal. Right eye exhibits no discharge. Left eye exhibits no discharge. No scleral icterus.   Neck: Normal range of motion. Neck supple. No thyromegaly present.   Cardiovascular: Normal rate, regular rhythm and normal heart sounds. Exam reveals no gallop and no friction rub.   Pulmonary/Chest: Effort normal. No stridor. No respiratory distress. She has no wheezes.   Abdominal: Soft. Bowel sounds are normal. She exhibits no mass. There is no tenderness. There is no rebound and no guarding.   Musculoskeletal: Normal range of motion. She exhibits edema. She exhibits no tenderness.   Lymphadenopathy:     She has no cervical adenopathy.   Neurological: She is alert and oriented to person, place, and time. She exhibits normal muscle tone.   Skin: Skin is warm. No rash noted. She is not diaphoretic. No erythema.   Psychiatric: She has a normal mood and affect. Her behavior is normal.   Nursing note and vitals reviewed.            Lab Results - Last 18 Months   Lab Units 12/10/19  0900 08/19/19  1116 04/24/19  1101   WBC 10*3/mm3 4.96 4.50 5.0   HEMOGLOBIN g/dL 13.9 13.6 14.5   HEMATOCRIT % 40.0 40.1 43.1   PLATELETS 10*3/mm3 92* 91* 115*   MCV fL 92.0 91.0 91.7     Lab Results - Last 18 Months   Lab Units 12/10/19  0900 " 11/22/19  0953 08/19/19  1116   SODIUM mmol/L 144 143 139   POTASSIUM mmol/L 4.6 4.7 4.3   CHLORIDE mmol/L 105 103 103   CO2 mmol/L 28.8 30.7* 27.0   BUN mg/dL 9 13 12   CREATININE mg/dL 0.77 0.80 0.80   CALCIUM mg/dL 9.2 9.1 8.9   BILIRUBIN mg/dL 0.8 0.8 1.0   ALK PHOS U/L 77 77 59   ALT (SGPT) U/L 16 18 18   AST (SGOT) U/L 21 24 22   GLUCOSE mg/dL 99 93 78       Lab Results   Component Value Date    GLUCOSE 99 12/10/2019    BUN 9 12/10/2019    CREATININE 0.77 12/10/2019    EGFRIFNONA 73 12/10/2019    EGFRIFAFRI 76 04/10/2017    BCR 11.7 12/10/2019    K 4.6 12/10/2019    CO2 28.8 12/10/2019    CALCIUM 9.2 12/10/2019    ALBUMIN 4.30 12/10/2019    LABIL2 1.5 04/24/2019    AST 21 12/10/2019    ALT 16 12/10/2019       No results for input(s): APTT, INR, PTT in the last 36482 hours.    No results found for: IRON, TIBC, FERRITIN    No results found for: FOLATE    No results found for: OCCULTBLD    No results found for: RETICCTPCT  No results found for: XVTHUWGT22  No results found for: SPEP, UPEP  No results found for: LDH, URICACID  Lab Results   Component Value Date    SELENA  08/20/2018     NEGATIVE This result is designed to aid in the diagnosis of many of the    SELENA  08/20/2018     systemic autoimmune disorders and is not diagnostic by itself.  Test results    SELENA  08/20/2018     should be interpreted in conjunction with the clinical evaluation of the    SELENA  08/20/2018     patient.  SLE patients undergoing steroid therapy may have negative results.    SEDRATE 5 12/10/2019     No results found for: FIBRINOGEN, HAPTOGLOBIN  Lab Results   Component Value Date    INR 1.1 05/07/2018     No results found for:   No results found for: CEA  No components found for: CA-19-9  No results found for: PSA      Assessment/Plan       Assessment:  1. History of pernicious anemia:  Her parietal cell antibody was positive.  She was on B12 injections, at home, but she has stopped taking them.  B12 shots were resumed in November 2018.   However recently patient has not been receiving them.  2. Chronic thrombocytopenia:  This could be immune mediated and also from mild splenomegaly.  Her spleen was 14 cm on CT scan in 2013.  In addition, she has lupus.  .  She was previously given a 5-day trial of prednisone 80 mg every day and she had a very mild response.  Patient also on aspirin and Eliquis and is tolerating well.  3. History of lupus:  She is on Plaquenil.  She follows with rheumatology.  4. Rash:  Resolved.   5. Intermittent leukopenia.    6. Atrial fibrillation:  Now on anticoagulation.    Plan:  1. Resume B12 injections q monthly.    2. CBC rechecked today.  Platelets are at 89,000.  She does have excess bruising.  She is tolerating okay.  She is on aspirin and Eliquis.  Advised caution.  3. Continue to follow with rheumatology.    4. Okay with continuing anticoagulation.  5. Follow-up in one year.           Thank you very much for providing the opportunity to participate in this patient’s care. Please do not hesitate to call if there are any other questions.    I have reviewed all relevant labs results, outside reports, imaging, notes, vital signs, medications, and plan with the patient today.    Portions of the note have been scribed by medical assistant/nurse.  I have reviewed and made changes accordingly.        Electronically signed by Deshawn Walters MD, 07/06/20, 11:17 AM.

## 2020-07-07 RX ORDER — CIPROFLOXACIN 500 MG/1
500 TABLET, FILM COATED ORAL 2 TIMES DAILY
Qty: 14 TABLET | Refills: 0 | Status: SHIPPED | OUTPATIENT
Start: 2020-07-07 | End: 2020-12-01

## 2020-11-25 ENCOUNTER — LAB (OUTPATIENT)
Dept: LAB | Facility: HOSPITAL | Age: 75
End: 2020-11-25

## 2020-11-25 DIAGNOSIS — I35.1 NONRHEUMATIC AORTIC VALVE INSUFFICIENCY: ICD-10-CM

## 2020-11-25 DIAGNOSIS — I50.32 CHRONIC HEART FAILURE WITH PRESERVED EJECTION FRACTION (HCC): ICD-10-CM

## 2020-11-25 DIAGNOSIS — I48.0 PAROXYSMAL ATRIAL FIBRILLATION (HCC): ICD-10-CM

## 2020-11-25 DIAGNOSIS — R53.83 FATIGUE, UNSPECIFIED TYPE: ICD-10-CM

## 2020-11-25 DIAGNOSIS — E78.5 DYSLIPIDEMIA: ICD-10-CM

## 2020-11-25 DIAGNOSIS — I34.0 NONRHEUMATIC MITRAL VALVE REGURGITATION: ICD-10-CM

## 2020-11-25 DIAGNOSIS — I25.10 CAD S/P PERCUTANEOUS CORONARY ANGIOPLASTY: ICD-10-CM

## 2020-11-25 DIAGNOSIS — Z98.61 CAD S/P PERCUTANEOUS CORONARY ANGIOPLASTY: ICD-10-CM

## 2020-11-25 DIAGNOSIS — I10 ESSENTIAL HYPERTENSION: ICD-10-CM

## 2020-11-25 DIAGNOSIS — Z79.01 LONG TERM (CURRENT) USE OF ANTICOAGULANTS: ICD-10-CM

## 2020-11-25 LAB
ALBUMIN SERPL-MCNC: 4.5 G/DL (ref 3.5–5.2)
ALBUMIN/GLOB SERPL: 2.1 G/DL
ALP SERPL-CCNC: 69 U/L (ref 39–117)
ALT SERPL W P-5'-P-CCNC: 19 U/L (ref 1–33)
ANION GAP SERPL CALCULATED.3IONS-SCNC: 10.3 MMOL/L (ref 5–15)
AST SERPL-CCNC: 23 U/L (ref 1–32)
BILIRUB SERPL-MCNC: 0.9 MG/DL (ref 0–1.2)
BUN SERPL-MCNC: 10 MG/DL (ref 8–23)
BUN/CREAT SERPL: 11.8 (ref 7–25)
CALCIUM SPEC-SCNC: 9.2 MG/DL (ref 8.6–10.5)
CHLORIDE SERPL-SCNC: 105 MMOL/L (ref 98–107)
CHOLEST SERPL-MCNC: 95 MG/DL (ref 0–200)
CK SERPL-CCNC: 118 U/L (ref 20–180)
CO2 SERPL-SCNC: 25.7 MMOL/L (ref 22–29)
CREAT SERPL-MCNC: 0.85 MG/DL (ref 0.57–1)
GFR SERPL CREATININE-BSD FRML MDRD: 65 ML/MIN/1.73
GLOBULIN UR ELPH-MCNC: 2.1 GM/DL
GLUCOSE SERPL-MCNC: 101 MG/DL (ref 65–99)
HDLC SERPL-MCNC: 41 MG/DL (ref 40–60)
LDLC SERPL CALC-MCNC: 36 MG/DL (ref 0–100)
LDLC/HDLC SERPL: 0.88 {RATIO}
POTASSIUM SERPL-SCNC: 4.4 MMOL/L (ref 3.5–5.2)
PROT SERPL-MCNC: 6.6 G/DL (ref 6–8.5)
SODIUM SERPL-SCNC: 141 MMOL/L (ref 136–145)
TRIGL SERPL-MCNC: 90 MG/DL (ref 0–150)
VLDLC SERPL-MCNC: 18 MG/DL (ref 5–40)

## 2020-11-25 PROCEDURE — 82550 ASSAY OF CK (CPK): CPT

## 2020-11-25 PROCEDURE — 80053 COMPREHEN METABOLIC PANEL: CPT

## 2020-11-25 PROCEDURE — 80061 LIPID PANEL: CPT

## 2020-11-25 PROCEDURE — 36415 COLL VENOUS BLD VENIPUNCTURE: CPT

## 2020-12-01 ENCOUNTER — TELEPHONE (OUTPATIENT)
Dept: CARDIOLOGY | Facility: CLINIC | Age: 75
End: 2020-12-01

## 2020-12-01 ENCOUNTER — OFFICE VISIT (OUTPATIENT)
Dept: CARDIOLOGY | Facility: CLINIC | Age: 75
End: 2020-12-01

## 2020-12-01 VITALS
WEIGHT: 200 LBS | DIASTOLIC BLOOD PRESSURE: 84 MMHG | HEART RATE: 60 BPM | SYSTOLIC BLOOD PRESSURE: 126 MMHG | OXYGEN SATURATION: 95 % | BODY MASS INDEX: 36.8 KG/M2 | HEIGHT: 62 IN

## 2020-12-01 DIAGNOSIS — I10 ESSENTIAL HYPERTENSION: ICD-10-CM

## 2020-12-01 DIAGNOSIS — I50.32 CHRONIC HEART FAILURE WITH PRESERVED EJECTION FRACTION (HCC): ICD-10-CM

## 2020-12-01 DIAGNOSIS — R00.1 JUNCTIONAL (NODAL) BRADYCARDIA: ICD-10-CM

## 2020-12-01 DIAGNOSIS — I48.0 PAROXYSMAL ATRIAL FIBRILLATION (HCC): ICD-10-CM

## 2020-12-01 DIAGNOSIS — I20.0 UNSTABLE ANGINA PECTORIS (HCC): Primary | ICD-10-CM

## 2020-12-01 DIAGNOSIS — Z79.01 LONG TERM (CURRENT) USE OF ANTICOAGULANTS: ICD-10-CM

## 2020-12-01 DIAGNOSIS — R94.31 ABNORMAL EKG: ICD-10-CM

## 2020-12-01 DIAGNOSIS — I35.1 NONRHEUMATIC AORTIC VALVE INSUFFICIENCY: ICD-10-CM

## 2020-12-01 DIAGNOSIS — I25.10 CAD S/P PERCUTANEOUS CORONARY ANGIOPLASTY: ICD-10-CM

## 2020-12-01 DIAGNOSIS — E78.5 DYSLIPIDEMIA: ICD-10-CM

## 2020-12-01 DIAGNOSIS — I49.5 SINUS NODE DYSFUNCTION (HCC): ICD-10-CM

## 2020-12-01 DIAGNOSIS — Z98.61 CAD S/P PERCUTANEOUS CORONARY ANGIOPLASTY: ICD-10-CM

## 2020-12-01 DIAGNOSIS — I34.0 NONRHEUMATIC MITRAL VALVE REGURGITATION: ICD-10-CM

## 2020-12-01 DIAGNOSIS — E11.9 TYPE 2 DIABETES MELLITUS WITHOUT COMPLICATION, WITHOUT LONG-TERM CURRENT USE OF INSULIN (HCC): ICD-10-CM

## 2020-12-01 PROCEDURE — 99214 OFFICE O/P EST MOD 30 MIN: CPT | Performed by: INTERNAL MEDICINE

## 2020-12-01 PROCEDURE — 93000 ELECTROCARDIOGRAM COMPLETE: CPT | Performed by: INTERNAL MEDICINE

## 2020-12-01 RX ORDER — PAROXETINE 30 MG/1
30 TABLET, FILM COATED ORAL DAILY
COMMUNITY
Start: 2020-11-20 | End: 2021-08-22 | Stop reason: HOSPADM

## 2020-12-01 RX ORDER — PANTOPRAZOLE SODIUM 40 MG/1
40 TABLET, DELAYED RELEASE ORAL DAILY
COMMUNITY
Start: 2020-09-14 | End: 2021-07-06

## 2020-12-01 RX ORDER — AMLODIPINE BESYLATE 10 MG/1
10 TABLET ORAL DAILY
Qty: 90 TABLET | Refills: 3 | Status: SHIPPED | OUTPATIENT
Start: 2020-12-01 | End: 2022-08-02

## 2020-12-07 ENCOUNTER — OFFICE VISIT (OUTPATIENT)
Dept: CARDIOLOGY | Facility: CLINIC | Age: 75
End: 2020-12-07

## 2020-12-07 ENCOUNTER — HOSPITAL ENCOUNTER (OUTPATIENT)
Dept: CARDIOLOGY | Facility: HOSPITAL | Age: 75
Discharge: HOME | End: 2020-12-07

## 2020-12-07 VITALS
HEIGHT: 62 IN | TEMPERATURE: 97.5 F | DIASTOLIC BLOOD PRESSURE: 90 MMHG | BODY MASS INDEX: 36.8 KG/M2 | WEIGHT: 200 LBS | HEART RATE: 68 BPM | SYSTOLIC BLOOD PRESSURE: 160 MMHG

## 2020-12-07 DIAGNOSIS — I20.0 UNSTABLE ANGINA PECTORIS (HCC): ICD-10-CM

## 2020-12-07 DIAGNOSIS — I34.0 NONRHEUMATIC MITRAL VALVE REGURGITATION: ICD-10-CM

## 2020-12-07 DIAGNOSIS — R07.2 PRECORDIAL PAIN: ICD-10-CM

## 2020-12-07 DIAGNOSIS — R06.09 DYSPNEA ON EXERTION: Primary | ICD-10-CM

## 2020-12-07 DIAGNOSIS — R94.31 ABNORMAL EKG: ICD-10-CM

## 2020-12-07 DIAGNOSIS — I49.5 SINUS NODE DYSFUNCTION (HCC): ICD-10-CM

## 2020-12-07 DIAGNOSIS — I48.0 PAROXYSMAL ATRIAL FIBRILLATION (HCC): ICD-10-CM

## 2020-12-07 DIAGNOSIS — Z98.61 CAD S/P PERCUTANEOUS CORONARY ANGIOPLASTY: ICD-10-CM

## 2020-12-07 DIAGNOSIS — Z79.01 LONG TERM (CURRENT) USE OF ANTICOAGULANTS: ICD-10-CM

## 2020-12-07 DIAGNOSIS — E11.9 TYPE 2 DIABETES MELLITUS WITHOUT COMPLICATION, WITHOUT LONG-TERM CURRENT USE OF INSULIN (HCC): ICD-10-CM

## 2020-12-07 DIAGNOSIS — R00.1 JUNCTIONAL (NODAL) BRADYCARDIA: ICD-10-CM

## 2020-12-07 DIAGNOSIS — I10 ESSENTIAL HYPERTENSION: ICD-10-CM

## 2020-12-07 DIAGNOSIS — E78.5 DYSLIPIDEMIA: ICD-10-CM

## 2020-12-07 DIAGNOSIS — I50.32 CHRONIC HEART FAILURE WITH PRESERVED EJECTION FRACTION (HCC): ICD-10-CM

## 2020-12-07 DIAGNOSIS — I35.1 NONRHEUMATIC AORTIC VALVE INSUFFICIENCY: ICD-10-CM

## 2020-12-07 DIAGNOSIS — I25.10 CAD S/P PERCUTANEOUS CORONARY ANGIOPLASTY: ICD-10-CM

## 2020-12-07 PROCEDURE — A9500 TC99M SESTAMIBI: HCPCS | Performed by: INTERNAL MEDICINE

## 2020-12-07 PROCEDURE — 99214 OFFICE O/P EST MOD 30 MIN: CPT | Performed by: INTERNAL MEDICINE

## 2020-12-07 PROCEDURE — 0 TECHNETIUM SESTAMIBI: Performed by: INTERNAL MEDICINE

## 2020-12-07 PROCEDURE — 25010000002 REGADENOSON 0.4 MG/5ML SOLUTION: Performed by: INTERNAL MEDICINE

## 2020-12-07 PROCEDURE — 93018 CV STRESS TEST I&R ONLY: CPT | Performed by: INTERNAL MEDICINE

## 2020-12-07 PROCEDURE — 93017 CV STRESS TEST TRACING ONLY: CPT

## 2020-12-07 PROCEDURE — 78452 HT MUSCLE IMAGE SPECT MULT: CPT

## 2020-12-07 PROCEDURE — 93016 CV STRESS TEST SUPVJ ONLY: CPT | Performed by: INTERNAL MEDICINE

## 2020-12-07 PROCEDURE — 78452 HT MUSCLE IMAGE SPECT MULT: CPT | Performed by: INTERNAL MEDICINE

## 2020-12-07 RX ORDER — METOPROLOL SUCCINATE 25 MG/1
25 TABLET, EXTENDED RELEASE ORAL DAILY
Qty: 90 TABLET | Refills: 3 | Status: SHIPPED | OUTPATIENT
Start: 2020-12-07 | End: 2021-07-06

## 2020-12-07 RX ADMIN — TECHNETIUM TC 99M SESTAMIBI 1 DOSE: 1 INJECTION INTRAVENOUS at 13:15

## 2020-12-07 RX ADMIN — REGADENOSON 0.4 MG: 0.08 INJECTION, SOLUTION INTRAVENOUS at 15:15

## 2020-12-07 NOTE — PROGRESS NOTES
Subjective:     Encounter Date:12/07/2020      Patient ID: Ally Ivory is a 74 y.o. female.    Chief Complaint : Stress test follow-up.  Complaining of dyspnea on exertion.  Has history of CAD, PCI, paroxysmal A. fib, long-term anticoagulation  History of Present Illness        This is a 74-year-old with PMH of    #.CAD, JUAN , cath 5/9/18 Severe disease in OM1 branch of LCX,FFR RCA 0.97  Patent stent in ostial right with moderate InStent stenosis and noncritical FFR at 0.90  Elevated  LVEDP with normal LV systolic function. cath  01/21/2019 PTCA to instent  RCA.  Repeat stent to RCA in [place] in 2019  #. CAROLYN-TACHY SYNDROME with  P. A.FIB  , long-term anticoagulation  #   dyslipidemia,statin allergy  #  hypertension, positive family history, obesity, hypothyroidism.  #  history of lupus, thrombocytopenia, pernicious anemia.  psoriasis, Raynaud's, Sjogren's  #   cholecystectomy  #.  moderate MR and -moderate AI, last echo 20 60  #  type 2 diabetes       Here for   followup.  Patient is complaining of class III dyspnea on exertion, also has chest pain which she will which is like a needle sticking in the mid chest.  Reportedly had low blood pressure at home.  Patient is already low pressure is 160/90, heart rate 68, temperature 97.5.   Patient  had labs done with PMD Dr. Vasquez. and reportedly sugars are running good denies any hypoglycemia episodes. patient had labs done 08/20/2018 with rheumatology CMP was unremarkable.  Labs done 11/22/2019 revealed normal CMP, CK, cholesterol of 191, HDL 40  triglycerides 148, Labs from 11/15/2020 reveal cholesterol 95 triglycerides 90 HDL 41 LDL 36 CMP and Ck unremarkable       ASSESSMENT:  #Dyspnea on exertion  #Sinus node dysfunction with junctional bradycardia  #Chest pain, atypical for angina  #Dyslipidemia  #   paroxysmal atrial fibrillation, long-term anticoagulation  # . chronic CHF due to diastolic dysfunction with normal LV systolic function in the  past  #.   CAD ,PCI  #.  hypertension, dyslipidemia  #.   ambrocio-tachy syndrome , s/p ILR  #  PVC  #.  moderate MR and-moderate AI     PLAN:  We will add Toprol 25 mg  Schedule an echocardiogram to assess dyspnea on exertion  Reviewed stress test results with patient  Continue medical management with amlodipine, aspirin, Eliquis, isosorbide, rosuvastatin, KCl as tolerated.  Continue statins for dyslipidemia and counseled on  walking exercise for low HDL  Physical exam is unchanged  Patient has previous history of bradycardia and sinus node dysfunction we will continue symptoms and monitor rhythm closely on 25 Toprol.        Assessment:          Diagnosis Plan   1. Dyspnea on exertion  Adult Transthoracic Echo Complete W/ Cont if Necessary Per Protocol   2. Precordial pain  Adult Transthoracic Echo Complete W/ Cont if Necessary Per Protocol   3. CAD S/P percutaneous coronary angioplasty  Adult Transthoracic Echo Complete W/ Cont if Necessary Per Protocol   4. Essential hypertension  Adult Transthoracic Echo Complete W/ Cont if Necessary Per Protocol   5. Long term (current) use of anticoagulants  Adult Transthoracic Echo Complete W/ Cont if Necessary Per Protocol   6. Paroxysmal atrial fibrillation (CMS/HCC)  Adult Transthoracic Echo Complete W/ Cont if Necessary Per Protocol   7. Dyslipidemia  Adult Transthoracic Echo Complete W/ Cont if Necessary Per Protocol   8. Nonrheumatic aortic valve insufficiency  Adult Transthoracic Echo Complete W/ Cont if Necessary Per Protocol   9. Nonrheumatic mitral valve regurgitation  Adult Transthoracic Echo Complete W/ Cont if Necessary Per Protocol          Plan:         Past Medical History:  Past Medical History:   Diagnosis Date   • Atrial fibrillation (CMS/HCC)    • Coronary artery disease    • Dyslipidemia    • Hypertension    • Hypothyroidism    • Lupus (CMS/HCC)    • Lupus (CMS/HCC)    • Raynaud's disease      Past Surgical History:  Past Surgical History:   Procedure  Laterality Date   • CHOLECYSTECTOMY        Allergies:  Allergies   Allergen Reactions   • Pravastatin Rash     rash     • Vancomycin Hives     Home Meds:  Current Meds:     Current Outpatient Medications:   •  amLODIPine (NORVASC) 10 MG tablet, Take 1 tablet by mouth Daily., Disp: 90 tablet, Rfl: 3  •  apixaban (ELIQUIS) 5 MG tablet tablet, 5 mg Every 12 (Twelve) Hours., Disp: , Rfl:   •  aspirin (ASPIRIN 81) 81 MG chewable tablet, Chew 1 tablet Daily., Disp: 90 tablet, Rfl: 5  •  Cholecalciferol (VITAMIN D3) 12067 units tablet, Every 7 (Seven) Days., Disp: , Rfl:   •  cyanocobalamin 1000 MCG/ML injection, Inject 1,000 mcg into the appropriate muscle as directed by prescriber Every 30 (Thirty) Days., Disp: , Rfl: 4  •  folic acid (FOLVITE) 1 MG tablet, 800mcg daily, Disp: , Rfl:   •  gabapentin (NEURONTIN) 100 MG capsule, GABAPENTIN 100 MG CAPS, Disp: , Rfl:   •  hydroxychloroquine (PLAQUENIL) 200 MG tablet, hydroxychloroquine 200 mg tablet  TAKE 1 TABLET BY MOUTH TWICE A DAY, Disp: , Rfl:   •  isosorbide mononitrate (IMDUR) 30 MG 24 hr tablet, isosorbide mononitrate ER 30 mg tablet,extended release 24 hr  Take 1 tablet every day by oral route., Disp: , Rfl:   •  levothyroxine (SYNTHROID, LEVOTHROID) 137 MCG tablet, TAKE 1 TABLET BY MOUTH EVERY DAY, Disp: , Rfl:   •  pantoprazole (PROTONIX) 40 MG EC tablet, Take 40 mg by mouth Daily., Disp: , Rfl:   •  PARoxetine (PAXIL) 30 MG tablet, Take 30 mg by mouth Daily., Disp: , Rfl:   •  potassium chloride (KLOR-CON) 8 MEQ CR tablet, Klor-Con 8 mEq tablet,extended release, Disp: , Rfl:   •  rosuvastatin (CRESTOR) 5 MG tablet, rosuvastatin 5 mg tablet  TAKE 1 TABLET BY MOUTH EVERY DAY, Disp: , Rfl:   •  metoprolol succinate XL (TOPROL-XL) 25 MG 24 hr tablet, Take 1 tablet by mouth Daily., Disp: 90 tablet, Rfl: 3  Social History:   Social History     Tobacco Use   • Smoking status: Never Smoker   • Smokeless tobacco: Never Used   Substance Use Topics   • Alcohol use: Not  "Currently      Family History:  No family history on file.     The following portions of the patient's history were reviewed and updated as appropriate: allergies, current medications, past family history, past medical history, past social history, past surgical history and problem list.      Review of Systems   Cardiovascular: Positive for chest pain, leg swelling and palpitations.   Respiratory: Positive for shortness of breath.    Neurological: Positive for dizziness and numbness.     All other systems are negative    Procedures       Objective:     Physical Exam  /90 (BP Location: Left arm, Patient Position: Sitting, Cuff Size: Large Adult)   Pulse 68   Temp 97.5 °F (36.4 °C)   Ht 157.5 cm (62\")   Wt 90.7 kg (200 lb)   BMI 36.58 kg/m²   General:  Appears in no acute distress  Eyes: Sclera is anicteric,  conjunctiva is clear   HEENT:  No JVD. Thyroid not visibly enlarged. No mucosal pallor or cyanosis  Respiratory: Respirations regular and unlabored at rest.  Bilaterally good breath sounds, with good air entry in all fields. No crackles, rubs or wheezes auscultated  Cardiovascular: S1,S2 Regular rate and rhythm. No murmur, rub or gallop auscultated. No pretibial pitting edema  Gastrointestinal: Abdomen soft, flat, non tender. Bowel sounds present.   Musculoskeletal:  No abnormal movements  Extremities: No digital clubbing or cyanosis  Skin: Color pink. Skin warm and dry to touch. No rashes  No xanthoma  Neuro: Alert and awake, no lateralizing deficits appreciated    Lab Reviewed:                  "

## 2020-12-08 LAB
BH CV STRESS COMMENTS STAGE 1: NORMAL
BH CV STRESS DOSE REGADENOSON STAGE 1: 0.4
BH CV STRESS DURATION MIN STAGE 1: 0
BH CV STRESS DURATION SEC STAGE 1: 10
BH CV STRESS PROTOCOL 1: NORMAL
BH CV STRESS RECOVERY BP: NORMAL MMHG
BH CV STRESS RECOVERY HR: 71 BPM
BH CV STRESS STAGE 1: 1
MAXIMAL PREDICTED HEART RATE: 145 BPM
STRESS BASELINE BP: NORMAL MMHG
STRESS BASELINE HR: 68 BPM
STRESS TARGET HR: 123 BPM

## 2020-12-15 ENCOUNTER — HOSPITAL ENCOUNTER (OUTPATIENT)
Dept: CARDIOLOGY | Facility: HOSPITAL | Age: 75
Discharge: HOME | End: 2020-12-15
Admitting: INTERNAL MEDICINE

## 2020-12-15 VITALS
BODY MASS INDEX: 36.8 KG/M2 | SYSTOLIC BLOOD PRESSURE: 146 MMHG | HEIGHT: 62 IN | WEIGHT: 200 LBS | DIASTOLIC BLOOD PRESSURE: 65 MMHG

## 2020-12-15 DIAGNOSIS — I25.10 CAD S/P PERCUTANEOUS CORONARY ANGIOPLASTY: ICD-10-CM

## 2020-12-15 DIAGNOSIS — R07.2 PRECORDIAL PAIN: ICD-10-CM

## 2020-12-15 DIAGNOSIS — E78.5 DYSLIPIDEMIA: ICD-10-CM

## 2020-12-15 DIAGNOSIS — Z79.01 LONG TERM (CURRENT) USE OF ANTICOAGULANTS: ICD-10-CM

## 2020-12-15 DIAGNOSIS — I10 ESSENTIAL HYPERTENSION: ICD-10-CM

## 2020-12-15 DIAGNOSIS — R06.09 DYSPNEA ON EXERTION: ICD-10-CM

## 2020-12-15 DIAGNOSIS — I35.1 NONRHEUMATIC AORTIC VALVE INSUFFICIENCY: ICD-10-CM

## 2020-12-15 DIAGNOSIS — I48.0 PAROXYSMAL ATRIAL FIBRILLATION (HCC): ICD-10-CM

## 2020-12-15 DIAGNOSIS — Z98.61 CAD S/P PERCUTANEOUS CORONARY ANGIOPLASTY: ICD-10-CM

## 2020-12-15 DIAGNOSIS — I34.0 NONRHEUMATIC MITRAL VALVE REGURGITATION: ICD-10-CM

## 2020-12-15 LAB
BH CV ECHO MEAS - ACS: 1.9 CM
BH CV ECHO MEAS - AI DEC SLOPE: 298.4 CM/SEC^2
BH CV ECHO MEAS - AI DEC TIME: 1.6 SEC
BH CV ECHO MEAS - AI MAX PG: 91.2 MMHG
BH CV ECHO MEAS - AI MAX VEL: 477.4 CM/SEC
BH CV ECHO MEAS - AI P1/2T: 468.6 MSEC
BH CV ECHO MEAS - AO MAX PG (FULL): 41.4 MMHG
BH CV ECHO MEAS - AO MAX PG: 45 MMHG
BH CV ECHO MEAS - AO MEAN PG (FULL): 2 MMHG
BH CV ECHO MEAS - AO MEAN PG: 3.7 MMHG
BH CV ECHO MEAS - AO ROOT AREA (BSA CORRECTED): 1.3
BH CV ECHO MEAS - AO ROOT AREA: 5.1 CM^2
BH CV ECHO MEAS - AO ROOT DIAM: 2.5 CM
BH CV ECHO MEAS - AO V2 MAX: 285.5 CM/SEC
BH CV ECHO MEAS - AO V2 MEAN: 88.8 CM/SEC
BH CV ECHO MEAS - AO V2 VTI: 31.4 CM
BH CV ECHO MEAS - ASC AORTA: 2.9 CM
BH CV ECHO MEAS - AVA(I,A): 2.1 CM^2
BH CV ECHO MEAS - AVA(I,D): 2.1 CM^2
BH CV ECHO MEAS - AVA(V,A): 0.95 CM^2
BH CV ECHO MEAS - AVA(V,D): 0.95 CM^2
BH CV ECHO MEAS - BSA(HAYCOCK): 2 M^2
BH CV ECHO MEAS - BSA: 1.9 M^2
BH CV ECHO MEAS - BZI_BMI: 36.6 KILOGRAMS/M^2
BH CV ECHO MEAS - BZI_METRIC_HEIGHT: 157.5 CM
BH CV ECHO MEAS - BZI_METRIC_WEIGHT: 90.7 KG
BH CV ECHO MEAS - EDV(CUBED): 77 ML
BH CV ECHO MEAS - EDV(MOD-SP4): 42.9 ML
BH CV ECHO MEAS - EDV(TEICH): 81 ML
BH CV ECHO MEAS - EF(CUBED): 84.3 %
BH CV ECHO MEAS - EF(MOD-SP4): 53.1 %
BH CV ECHO MEAS - EF(TEICH): 77.8 %
BH CV ECHO MEAS - ESV(CUBED): 12.1 ML
BH CV ECHO MEAS - ESV(MOD-SP4): 20.1 ML
BH CV ECHO MEAS - ESV(TEICH): 18 ML
BH CV ECHO MEAS - FS: 46.1 %
BH CV ECHO MEAS - IVS/LVPW: 1.2
BH CV ECHO MEAS - IVSD: 1 CM
BH CV ECHO MEAS - LA DIMENSION: 4.5 CM
BH CV ECHO MEAS - LA/AO: 1.8
BH CV ECHO MEAS - LV DIASTOLIC VOL/BSA (35-75): 22.4 ML/M^2
BH CV ECHO MEAS - LV MASS(C)D: 130 GRAMS
BH CV ECHO MEAS - LV MASS(C)DI: 68 GRAMS/M^2
BH CV ECHO MEAS - LV MAX PG: 3.6 MMHG
BH CV ECHO MEAS - LV MEAN PG: 1.7 MMHG
BH CV ECHO MEAS - LV SYSTOLIC VOL/BSA (12-30): 10.5 ML/M^2
BH CV ECHO MEAS - LV V1 MAX: 94.8 CM/SEC
BH CV ECHO MEAS - LV V1 MEAN: 62.7 CM/SEC
BH CV ECHO MEAS - LV V1 VTI: 22.8 CM
BH CV ECHO MEAS - LVIDD: 4.3 CM
BH CV ECHO MEAS - LVIDS: 2.3 CM
BH CV ECHO MEAS - LVOT AREA: 2.8 CM^2
BH CV ECHO MEAS - LVOT DIAM: 1.9 CM
BH CV ECHO MEAS - LVPWD: 0.85 CM
BH CV ECHO MEAS - MV A MAX VEL: 72.9 CM/SEC
BH CV ECHO MEAS - MV DEC SLOPE: 371.7 CM/SEC^2
BH CV ECHO MEAS - MV DEC TIME: 0.21 SEC
BH CV ECHO MEAS - MV E MAX VEL: 77.2 CM/SEC
BH CV ECHO MEAS - MV E/A: 1.1
BH CV ECHO MEAS - MV MAX PG: 3.1 MMHG
BH CV ECHO MEAS - MV MEAN PG: 1.2 MMHG
BH CV ECHO MEAS - MV V2 MAX: 88.2 CM/SEC
BH CV ECHO MEAS - MV V2 MEAN: 49.1 CM/SEC
BH CV ECHO MEAS - MV V2 VTI: 22.9 CM
BH CV ECHO MEAS - MVA(VTI): 2.8 CM^2
BH CV ECHO MEAS - PA ACC TIME: 0.18 SEC
BH CV ECHO MEAS - PA PR(ACCEL): -0.41 MMHG
BH CV ECHO MEAS - PULM A REVS DUR: 0.1 SEC
BH CV ECHO MEAS - PULM A REVS VEL: 28.3 CM/SEC
BH CV ECHO MEAS - PULM DIAS VEL: 51.2 CM/SEC
BH CV ECHO MEAS - PULM S/D: 1.6
BH CV ECHO MEAS - PULM SYS VEL: 79.5 CM/SEC
BH CV ECHO MEAS - RAP SYSTOLE: 3 MMHG
BH CV ECHO MEAS - RV MAX PG: 1.7 MMHG
BH CV ECHO MEAS - RV MEAN PG: 0.97 MMHG
BH CV ECHO MEAS - RV V1 MAX: 65.7 CM/SEC
BH CV ECHO MEAS - RV V1 MEAN: 46.6 CM/SEC
BH CV ECHO MEAS - RV V1 VTI: 19.5 CM
BH CV ECHO MEAS - RVDD: 2.8 CM
BH CV ECHO MEAS - RVSP: 25.2 MMHG
BH CV ECHO MEAS - SI(AO): 83.1 ML/M^2
BH CV ECHO MEAS - SI(CUBED): 34 ML/M^2
BH CV ECHO MEAS - SI(LVOT): 33.9 ML/M^2
BH CV ECHO MEAS - SI(MOD-SP4): 11.9 ML/M^2
BH CV ECHO MEAS - SI(TEICH): 32.9 ML/M^2
BH CV ECHO MEAS - SV(AO): 158.9 ML
BH CV ECHO MEAS - SV(CUBED): 64.9 ML
BH CV ECHO MEAS - SV(LVOT): 64.8 ML
BH CV ECHO MEAS - SV(MOD-SP4): 22.8 ML
BH CV ECHO MEAS - SV(TEICH): 63 ML
BH CV ECHO MEAS - TR MAX VEL: 235.1 CM/SEC
MAXIMAL PREDICTED HEART RATE: 145 BPM
STRESS TARGET HR: 123 BPM

## 2020-12-15 PROCEDURE — 93306 TTE W/DOPPLER COMPLETE: CPT

## 2020-12-15 PROCEDURE — 93306 TTE W/DOPPLER COMPLETE: CPT | Performed by: INTERNAL MEDICINE

## 2020-12-18 ENCOUNTER — TELEPHONE (OUTPATIENT)
Dept: CARDIOLOGY | Facility: CLINIC | Age: 75
End: 2020-12-18

## 2021-07-06 ENCOUNTER — OFFICE VISIT (OUTPATIENT)
Dept: CARDIOLOGY | Facility: CLINIC | Age: 76
End: 2021-07-06

## 2021-07-06 VITALS
HEIGHT: 62 IN | WEIGHT: 210 LBS | BODY MASS INDEX: 38.64 KG/M2 | SYSTOLIC BLOOD PRESSURE: 131 MMHG | DIASTOLIC BLOOD PRESSURE: 82 MMHG | HEART RATE: 70 BPM | OXYGEN SATURATION: 95 %

## 2021-07-06 DIAGNOSIS — E78.5 DYSLIPIDEMIA: ICD-10-CM

## 2021-07-06 DIAGNOSIS — D69.6 THROMBOCYTOPENIA (HCC): ICD-10-CM

## 2021-07-06 DIAGNOSIS — Z79.01 LONG TERM (CURRENT) USE OF ANTICOAGULANTS: ICD-10-CM

## 2021-07-06 DIAGNOSIS — I10 ESSENTIAL HYPERTENSION: ICD-10-CM

## 2021-07-06 DIAGNOSIS — I35.1 NONRHEUMATIC AORTIC VALVE INSUFFICIENCY: ICD-10-CM

## 2021-07-06 DIAGNOSIS — Z98.61 CAD S/P PERCUTANEOUS CORONARY ANGIOPLASTY: ICD-10-CM

## 2021-07-06 DIAGNOSIS — I48.0 PAROXYSMAL ATRIAL FIBRILLATION (HCC): Primary | ICD-10-CM

## 2021-07-06 DIAGNOSIS — I34.0 NONRHEUMATIC MITRAL VALVE REGURGITATION: ICD-10-CM

## 2021-07-06 DIAGNOSIS — I25.10 CAD S/P PERCUTANEOUS CORONARY ANGIOPLASTY: ICD-10-CM

## 2021-07-06 PROCEDURE — 99215 OFFICE O/P EST HI 40 MIN: CPT | Performed by: INTERNAL MEDICINE

## 2021-07-06 PROCEDURE — 93000 ELECTROCARDIOGRAM COMPLETE: CPT | Performed by: INTERNAL MEDICINE

## 2021-07-06 NOTE — H&P (VIEW-ONLY)
Subjective:     Encounter Date:07/06/2021      Patient ID: Ally Ivory is a 75 y.o. female.    Chief Complaint : Here for follow-up of her paroxysmal A. fib, long-term anticoagulation, CAD, PCI,, bradycardia, hypertension, dyslipidemia  History of Present Illness         This is a 75-year-old with PMH of    #.CAD, JUAN , cath 5/9/18 Severe disease in OM1 branch of LCX,FFR RCA 0.97  Patent stent in ostial right with moderate InStent stenosis and noncritical FFR at 0.90  Elevated  LVEDP with normal LV systolic function. cath  01/21/2019 PTCA to instent  RCA.  Repeat stent to RCA in [place] in 2019  #. CAROLYN-TACHY SYNDROME with  P. A.FIB  , long-term anticoagulation  #   dyslipidemia,statin allergy  #  hypertension, positive family history, obesity, hypothyroidism.  #  history of lupus, thrombocytopenia, pernicious anemia.  psoriasis, Raynaud's, Sjogren's  #   cholecystectomy  #.  moderate MR and -moderate AI, last echo 20 60  #  type 2 diabetes. (Patient says she is not diabetic.)       Here for   followup.  Patient denies any chest pain or shortness of breath..  Patient's arterial blood pressure is 131/82, heart rate 70, O2 sat of 95% on room air.  Patient's BMI is over 38.   Patient  had labs done with PMD Dr. Vasquez. and reportedly sugars are running good denies any hypoglycemia episodes. patient had labs done 08/20/2018 with rheumatology CMP was unremarkable.  Labs done 11/22/2019 revealed normal CMP, CK, cholesterol of 191, HDL 40  triglycerides 148, Labs from 11/15/2020 reveal cholesterol 95 triglycerides 90 HDL 41 LDL 36 CMP and Ck unremarkable.  Labs from 6/3/2021 revealed normal CMP, CBC with a platelet count of 114.       ASSESSMENT:  #Thrombocytopenia  #Dyspnea on exertion  #Sinus node dysfunction with junctional bradycardia  #Chest pain, atypical for angina  #Dyslipidemia  #   paroxysmal atrial fibrillation, long-term anticoagulation  # . chronic CHF due to diastolic dysfunction with  normal LV systolic function in the past  #.   CAD ,PCI  #.  hypertension, dyslipidemia  #.   ambrocio-tachy syndrome , s/p ILR  #  PVC  #.  moderate MR and-moderate AI     PLAN:  Reviewed EKG results with patient.  Patient has thrombocytopenia, therefore will hold off on aspirin.  Continue medical management with amlodipine,  Eliquis, isosorbide, rosuvastatin, KCl to help with A. fib, CHF, CAD, PCI, hypertension, dyslipidemia as tolerated.  Patient's [id number] over is 5,due to female gender, age over 75, hypertension, congestive heart failure ,will benefit from long-term anticoagulation.  But patient has thrombocytopenia which makes her a poor candidate for long-term anticoagulation.  Discussed about watchman with patient and at length for over 45 minutes.  Then patient wanted me to talk to her .  Brought in her  to my office and reviewed with both patient and her  at length and explained all the risk benefits alternatives including but not limited to bleeding infection stroke pericardial effusion and rarely death with watchman procedure..We will schedule for prewatchman TON and shared decision making.  Patient denies any dysphagia or odynophagia.  Risk benefits alternatives of TON explained.  Continue statins for dyslipidemia and counseled on  walking exercise for low HDL  Patient has previous history of bradycardia and sinus node dysfunction we will continue symptoms and monitor rhythm closely.  Patient's beta-blockers were discontinued because of bradycardia, heart rate is improved slightly.  Discussed about COVID-19 vaccination.  Patient already took both doses.  Patient's BMI is over 38 counseled on weight loss diet and exercise.  We will continue to monitor valvular heart disease which is asymptomatic at the current time.  Spent an hour taking care of patient today.  Cautioned on fall risk.        ECG 12 Lead    Date/Time: 7/6/2021 10:47 AM  Performed by: Eric Low,  MD  Authorized by: Eric Low MD   Comparison: compared with previous ECG from 12/1/2020  Comparison to previous ECG: EKG done today interpreted by me reveals sinus rhythm with rate of 67 bpm with low QRS voltage in precordial leads, compared to EKG from 12/1/2020 heart rate is improved.              The following portions of the patient's history were reviewed and updated as appropriate: allergies, current medications, past family history, past medical history, past social history, past surgical history and problem list.    Assessment:         [facility]     Diagnosis Plan   1. Paroxysmal atrial fibrillation (CMS/HCC)  ECG 12 Lead    COVID PRE-OP / PRE-PROCEDURE SCREENING ORDER (NO ISOLATION) - Swab, Nasopharynx    Adult Transesophageal Echo (TON) W/ Cont if Necessary Per Protocol    CBC (No Diff)    Basic Metabolic Panel    Protime-INR    aPTT    ECG 12 Lead   2. Long term (current) use of anticoagulants  ECG 12 Lead    COVID PRE-OP / PRE-PROCEDURE SCREENING ORDER (NO ISOLATION) - Swab, Nasopharynx    Adult Transesophageal Echo (TON) W/ Cont if Necessary Per Protocol    CBC (No Diff)    Basic Metabolic Panel    Protime-INR    aPTT    ECG 12 Lead   3. Thrombocytopenia (CMS/HCC)  ECG 12 Lead    COVID PRE-OP / PRE-PROCEDURE SCREENING ORDER (NO ISOLATION) - Swab, Nasopharynx    Adult Transesophageal Echo (TON) W/ Cont if Necessary Per Protocol    CBC (No Diff)    Basic Metabolic Panel    Protime-INR    aPTT    ECG 12 Lead   4. CAD S/P percutaneous coronary angioplasty  ECG 12 Lead    COVID PRE-OP / PRE-PROCEDURE SCREENING ORDER (NO ISOLATION) - Swab, Nasopharynx    Adult Transesophageal Echo (TON) W/ Cont if Necessary Per Protocol    CBC (No Diff)    Basic Metabolic Panel    Protime-INR    aPTT    ECG 12 Lead   5. Essential hypertension  ECG 12 Lead    COVID PRE-OP / PRE-PROCEDURE SCREENING ORDER (NO ISOLATION) - Swab, Nasopharynx    Adult Transesophageal Echo (TON) W/ Cont if Necessary Per Protocol    CBC (No  Diff)    Basic Metabolic Panel    Protime-INR    aPTT    ECG 12 Lead   6. Dyslipidemia  ECG 12 Lead    COVID PRE-OP / PRE-PROCEDURE SCREENING ORDER (NO ISOLATION) - Swab, Nasopharynx    Adult Transesophageal Echo (TON) W/ Cont if Necessary Per Protocol    CBC (No Diff)    Basic Metabolic Panel    Protime-INR    aPTT    ECG 12 Lead   7. Nonrheumatic mitral valve regurgitation  ECG 12 Lead    COVID PRE-OP / PRE-PROCEDURE SCREENING ORDER (NO ISOLATION) - Swab, Nasopharynx    Adult Transesophageal Echo (TON) W/ Cont if Necessary Per Protocol    CBC (No Diff)    Basic Metabolic Panel    Protime-INR    aPTT    ECG 12 Lead   8. Nonrheumatic aortic valve insufficiency  ECG 12 Lead    COVID PRE-OP / PRE-PROCEDURE SCREENING ORDER (NO ISOLATION) - Swab, Nasopharynx    Adult Transesophageal Echo (TON) W/ Cont if Necessary Per Protocol    CBC (No Diff)    Basic Metabolic Panel    Protime-INR    aPTT    ECG 12 Lead          Plan:               Past Medical History:  Past Medical History:   Diagnosis Date   • Atrial fibrillation (CMS/HCC)    • Coronary artery disease    • Dyslipidemia    • Hypertension    • Hypothyroidism    • Lupus (CMS/HCC)    • Lupus (CMS/HCC)    • Raynaud's disease      Past Surgical History:  Past Surgical History:   Procedure Laterality Date   • CHOLECYSTECTOMY        Allergies:  Allergies   Allergen Reactions   • Pravastatin Rash     rash     • Vancomycin Hives     Home Meds:  Current Meds:     Current Outpatient Medications:   •  amLODIPine (NORVASC) 10 MG tablet, Take 1 tablet by mouth Daily., Disp: 90 tablet, Rfl: 3  •  apixaban (ELIQUIS) 5 MG tablet tablet, 5 mg Every 12 (Twelve) Hours., Disp: , Rfl:   •  cyanocobalamin 1000 MCG/ML injection, Inject 1,000 mcg into the appropriate muscle as directed by prescriber Every 30 (Thirty) Days., Disp: , Rfl: 4  •  folic acid (FOLVITE) 1 MG tablet, 800mcg daily, Disp: , Rfl:   •  hydroxychloroquine (PLAQUENIL) 200 MG tablet, Take 200 mg by mouth Daily., Disp: ,  "Rfl:   •  isosorbide mononitrate (IMDUR) 30 MG 24 hr tablet, isosorbide mononitrate ER 30 mg tablet,extended release 24 hr  Take 1 tablet every day by oral route., Disp: , Rfl:   •  levothyroxine (SYNTHROID, LEVOTHROID) 137 MCG tablet, TAKE 1 TABLET BY MOUTH EVERY DAY, Disp: , Rfl:   •  PARoxetine (PAXIL) 30 MG tablet, Take 30 mg by mouth Daily., Disp: , Rfl:   •  rosuvastatin (CRESTOR) 5 MG tablet, rosuvastatin 5 mg tablet  TAKE 1 TABLET BY MOUTH EVERY DAY, Disp: , Rfl:   Social History:   Social History     Tobacco Use   • Smoking status: Never Smoker   • Smokeless tobacco: Never Used   Substance Use Topics   • Alcohol use: Not Currently      Family History:  History reviewed. No pertinent family history.           Review of Systems   Constitutional: Positive for malaise/fatigue. Negative for fever.   HENT: Negative for congestion and hearing loss.    Eyes: Negative for double vision and visual disturbance.   Cardiovascular: Positive for leg swelling. Negative for chest pain, claudication, dyspnea on exertion and syncope.   Respiratory: Positive for shortness of breath. Negative for cough.    Endocrine: Negative for cold intolerance.   Skin: Negative for color change and rash.   Musculoskeletal: Negative for arthritis and joint pain.   Gastrointestinal: Negative for abdominal pain and heartburn.   Genitourinary: Negative for hematuria.   Neurological: Positive for dizziness. Negative for excessive daytime sleepiness.   Psychiatric/Behavioral: Negative for depression. The patient is not nervous/anxious.    All other systems reviewed and are negative.    All other systems are negative         Objective:     Physical Exam  /82 (BP Location: Left arm, Patient Position: Sitting, Cuff Size: Large Adult)   Pulse 70   Ht 157.5 cm (62\")   Wt 95.3 kg (210 lb)   SpO2 95%   BMI 38.41 kg/m²   General:  Appears in no acute distress  Eyes: Sclera is anicteric,  conjunctiva is clear   HEENT:  No JVD.  No carotid " bruits  Respiratory: Respirations regular and unlabored at rest.  Clear to auscultation  Cardiovascular: S1,S2 Regular rate and rhythm. No murmur, rub or gallop auscultated.   Gastrointestinal: Abdomen nondistended, soft  Extremities: No digital clubbing or cyanosis, no edema  Skin: Color pink. Skin warm and dry to touch. No rashes  No xanthoma  Neuro: Alert and awake, no lateralizing deficits appreciated    Lab Reviewed:

## 2021-07-06 NOTE — PROGRESS NOTES
Subjective:     Encounter Date:07/06/2021      Patient ID: Ally Ivory is a 75 y.o. female.    Chief Complaint : Here for follow-up of her paroxysmal A. fib, long-term anticoagulation, CAD, PCI,, bradycardia, hypertension, dyslipidemia  History of Present Illness         This is a 75-year-old with PMH of    #.CAD, JUAN , cath 5/9/18 Severe disease in OM1 branch of LCX,FFR RCA 0.97  Patent stent in ostial right with moderate InStent stenosis and noncritical FFR at 0.90  Elevated  LVEDP with normal LV systolic function. cath  01/21/2019 PTCA to instent  RCA.  Repeat stent to RCA in [place] in 2019  #. CAROLYN-TACHY SYNDROME with  P. A.FIB  , long-term anticoagulation  #   dyslipidemia,statin allergy  #  hypertension, positive family history, obesity, hypothyroidism.  #  history of lupus, thrombocytopenia, pernicious anemia.  psoriasis, Raynaud's, Sjogren's  #   cholecystectomy  #.  moderate MR and -moderate AI, last echo 20 60  #  type 2 diabetes. (Patient says she is not diabetic.)       Here for   followup.  Patient denies any chest pain or shortness of breath..  Patient's arterial blood pressure is 131/82, heart rate 70, O2 sat of 95% on room air.  Patient's BMI is over 38.   Patient  had labs done with PMD Dr. Vasquez. and reportedly sugars are running good denies any hypoglycemia episodes. patient had labs done 08/20/2018 with rheumatology CMP was unremarkable.  Labs done 11/22/2019 revealed normal CMP, CK, cholesterol of 191, HDL 40  triglycerides 148, Labs from 11/15/2020 reveal cholesterol 95 triglycerides 90 HDL 41 LDL 36 CMP and Ck unremarkable.  Labs from 6/3/2021 revealed normal CMP, CBC with a platelet count of 114.       ASSESSMENT:  #Thrombocytopenia  #Dyspnea on exertion  #Sinus node dysfunction with junctional bradycardia  #Chest pain, atypical for angina  #Dyslipidemia  #   paroxysmal atrial fibrillation, long-term anticoagulation  # . chronic CHF due to diastolic dysfunction with  normal LV systolic function in the past  #.   CAD ,PCI  #.  hypertension, dyslipidemia  #.   ambrocio-tachy syndrome , s/p ILR  #  PVC  #.  moderate MR and-moderate AI     PLAN:  Reviewed EKG results with patient.  Patient has thrombocytopenia, therefore will hold off on aspirin.  Continue medical management with amlodipine,  Eliquis, isosorbide, rosuvastatin, KCl to help with A. fib, CHF, CAD, PCI, hypertension, dyslipidemia as tolerated.  Patient's [id number] over is 5,due to female gender, age over 75, hypertension, congestive heart failure ,will benefit from long-term anticoagulation.  But patient has thrombocytopenia which makes her a poor candidate for long-term anticoagulation.  Discussed about watchman with patient and at length for over 45 minutes.  Then patient wanted me to talk to her .  Brought in her  to my office and reviewed with both patient and her  at length and explained all the risk benefits alternatives including but not limited to bleeding infection stroke pericardial effusion and rarely death with watchman procedure..We will schedule for prewatchman TON and shared decision making.  Patient denies any dysphagia or odynophagia.  Risk benefits alternatives of TON explained.  Continue statins for dyslipidemia and counseled on  walking exercise for low HDL  Patient has previous history of bradycardia and sinus node dysfunction we will continue symptoms and monitor rhythm closely.  Patient's beta-blockers were discontinued because of bradycardia, heart rate is improved slightly.  Discussed about COVID-19 vaccination.  Patient already took both doses.  Patient's BMI is over 38 counseled on weight loss diet and exercise.  We will continue to monitor valvular heart disease which is asymptomatic at the current time.  Spent an hour taking care of patient today.  Cautioned on fall risk.        ECG 12 Lead    Date/Time: 7/6/2021 10:47 AM  Performed by: Eric Low,  MD  Authorized by: Eric Low MD   Comparison: compared with previous ECG from 12/1/2020  Comparison to previous ECG: EKG done today interpreted by me reveals sinus rhythm with rate of 67 bpm with low QRS voltage in precordial leads, compared to EKG from 12/1/2020 heart rate is improved.              The following portions of the patient's history were reviewed and updated as appropriate: allergies, current medications, past family history, past medical history, past social history, past surgical history and problem list.    Assessment:         [facility]     Diagnosis Plan   1. Paroxysmal atrial fibrillation (CMS/HCC)  ECG 12 Lead    COVID PRE-OP / PRE-PROCEDURE SCREENING ORDER (NO ISOLATION) - Swab, Nasopharynx    Adult Transesophageal Echo (TON) W/ Cont if Necessary Per Protocol    CBC (No Diff)    Basic Metabolic Panel    Protime-INR    aPTT    ECG 12 Lead   2. Long term (current) use of anticoagulants  ECG 12 Lead    COVID PRE-OP / PRE-PROCEDURE SCREENING ORDER (NO ISOLATION) - Swab, Nasopharynx    Adult Transesophageal Echo (TON) W/ Cont if Necessary Per Protocol    CBC (No Diff)    Basic Metabolic Panel    Protime-INR    aPTT    ECG 12 Lead   3. Thrombocytopenia (CMS/HCC)  ECG 12 Lead    COVID PRE-OP / PRE-PROCEDURE SCREENING ORDER (NO ISOLATION) - Swab, Nasopharynx    Adult Transesophageal Echo (TON) W/ Cont if Necessary Per Protocol    CBC (No Diff)    Basic Metabolic Panel    Protime-INR    aPTT    ECG 12 Lead   4. CAD S/P percutaneous coronary angioplasty  ECG 12 Lead    COVID PRE-OP / PRE-PROCEDURE SCREENING ORDER (NO ISOLATION) - Swab, Nasopharynx    Adult Transesophageal Echo (TON) W/ Cont if Necessary Per Protocol    CBC (No Diff)    Basic Metabolic Panel    Protime-INR    aPTT    ECG 12 Lead   5. Essential hypertension  ECG 12 Lead    COVID PRE-OP / PRE-PROCEDURE SCREENING ORDER (NO ISOLATION) - Swab, Nasopharynx    Adult Transesophageal Echo (TON) W/ Cont if Necessary Per Protocol    CBC (No  Diff)    Basic Metabolic Panel    Protime-INR    aPTT    ECG 12 Lead   6. Dyslipidemia  ECG 12 Lead    COVID PRE-OP / PRE-PROCEDURE SCREENING ORDER (NO ISOLATION) - Swab, Nasopharynx    Adult Transesophageal Echo (TON) W/ Cont if Necessary Per Protocol    CBC (No Diff)    Basic Metabolic Panel    Protime-INR    aPTT    ECG 12 Lead   7. Nonrheumatic mitral valve regurgitation  ECG 12 Lead    COVID PRE-OP / PRE-PROCEDURE SCREENING ORDER (NO ISOLATION) - Swab, Nasopharynx    Adult Transesophageal Echo (TON) W/ Cont if Necessary Per Protocol    CBC (No Diff)    Basic Metabolic Panel    Protime-INR    aPTT    ECG 12 Lead   8. Nonrheumatic aortic valve insufficiency  ECG 12 Lead    COVID PRE-OP / PRE-PROCEDURE SCREENING ORDER (NO ISOLATION) - Swab, Nasopharynx    Adult Transesophageal Echo (TON) W/ Cont if Necessary Per Protocol    CBC (No Diff)    Basic Metabolic Panel    Protime-INR    aPTT    ECG 12 Lead          Plan:               Past Medical History:  Past Medical History:   Diagnosis Date   • Atrial fibrillation (CMS/HCC)    • Coronary artery disease    • Dyslipidemia    • Hypertension    • Hypothyroidism    • Lupus (CMS/HCC)    • Lupus (CMS/HCC)    • Raynaud's disease      Past Surgical History:  Past Surgical History:   Procedure Laterality Date   • CHOLECYSTECTOMY        Allergies:  Allergies   Allergen Reactions   • Pravastatin Rash     rash     • Vancomycin Hives     Home Meds:  Current Meds:     Current Outpatient Medications:   •  amLODIPine (NORVASC) 10 MG tablet, Take 1 tablet by mouth Daily., Disp: 90 tablet, Rfl: 3  •  apixaban (ELIQUIS) 5 MG tablet tablet, 5 mg Every 12 (Twelve) Hours., Disp: , Rfl:   •  cyanocobalamin 1000 MCG/ML injection, Inject 1,000 mcg into the appropriate muscle as directed by prescriber Every 30 (Thirty) Days., Disp: , Rfl: 4  •  folic acid (FOLVITE) 1 MG tablet, 800mcg daily, Disp: , Rfl:   •  hydroxychloroquine (PLAQUENIL) 200 MG tablet, Take 200 mg by mouth Daily., Disp: ,  "Rfl:   •  isosorbide mononitrate (IMDUR) 30 MG 24 hr tablet, isosorbide mononitrate ER 30 mg tablet,extended release 24 hr  Take 1 tablet every day by oral route., Disp: , Rfl:   •  levothyroxine (SYNTHROID, LEVOTHROID) 137 MCG tablet, TAKE 1 TABLET BY MOUTH EVERY DAY, Disp: , Rfl:   •  PARoxetine (PAXIL) 30 MG tablet, Take 30 mg by mouth Daily., Disp: , Rfl:   •  rosuvastatin (CRESTOR) 5 MG tablet, rosuvastatin 5 mg tablet  TAKE 1 TABLET BY MOUTH EVERY DAY, Disp: , Rfl:   Social History:   Social History     Tobacco Use   • Smoking status: Never Smoker   • Smokeless tobacco: Never Used   Substance Use Topics   • Alcohol use: Not Currently      Family History:  History reviewed. No pertinent family history.           Review of Systems   Constitutional: Positive for malaise/fatigue. Negative for fever.   HENT: Negative for congestion and hearing loss.    Eyes: Negative for double vision and visual disturbance.   Cardiovascular: Positive for leg swelling. Negative for chest pain, claudication, dyspnea on exertion and syncope.   Respiratory: Positive for shortness of breath. Negative for cough.    Endocrine: Negative for cold intolerance.   Skin: Negative for color change and rash.   Musculoskeletal: Negative for arthritis and joint pain.   Gastrointestinal: Negative for abdominal pain and heartburn.   Genitourinary: Negative for hematuria.   Neurological: Positive for dizziness. Negative for excessive daytime sleepiness.   Psychiatric/Behavioral: Negative for depression. The patient is not nervous/anxious.    All other systems reviewed and are negative.    All other systems are negative         Objective:     Physical Exam  /82 (BP Location: Left arm, Patient Position: Sitting, Cuff Size: Large Adult)   Pulse 70   Ht 157.5 cm (62\")   Wt 95.3 kg (210 lb)   SpO2 95%   BMI 38.41 kg/m²   General:  Appears in no acute distress  Eyes: Sclera is anicteric,  conjunctiva is clear   HEENT:  No JVD.  No carotid " bruits  Respiratory: Respirations regular and unlabored at rest.  Clear to auscultation  Cardiovascular: S1,S2 Regular rate and rhythm. No murmur, rub or gallop auscultated.   Gastrointestinal: Abdomen nondistended, soft  Extremities: No digital clubbing or cyanosis, no edema  Skin: Color pink. Skin warm and dry to touch. No rashes  No xanthoma  Neuro: Alert and awake, no lateralizing deficits appreciated    Lab Reviewed:

## 2021-07-12 ENCOUNTER — APPOINTMENT (OUTPATIENT)
Dept: LAB | Facility: HOSPITAL | Age: 76
End: 2021-07-12

## 2021-07-16 ENCOUNTER — LAB (OUTPATIENT)
Dept: LAB | Facility: HOSPITAL | Age: 76
End: 2021-07-16

## 2021-07-16 ENCOUNTER — HOSPITAL ENCOUNTER (OUTPATIENT)
Dept: CARDIOLOGY | Facility: HOSPITAL | Age: 76
Discharge: HOME | End: 2021-07-16

## 2021-07-16 ENCOUNTER — ANESTHESIA EVENT (OUTPATIENT)
Dept: CARDIOLOGY | Facility: HOSPITAL | Age: 76
End: 2021-07-16

## 2021-07-16 DIAGNOSIS — I25.10 CAD S/P PERCUTANEOUS CORONARY ANGIOPLASTY: ICD-10-CM

## 2021-07-16 DIAGNOSIS — D69.6 THROMBOCYTOPENIA (HCC): ICD-10-CM

## 2021-07-16 DIAGNOSIS — Z98.61 CAD S/P PERCUTANEOUS CORONARY ANGIOPLASTY: ICD-10-CM

## 2021-07-16 DIAGNOSIS — E78.5 DYSLIPIDEMIA: ICD-10-CM

## 2021-07-16 DIAGNOSIS — I34.0 NONRHEUMATIC MITRAL VALVE REGURGITATION: ICD-10-CM

## 2021-07-16 DIAGNOSIS — I10 ESSENTIAL HYPERTENSION: ICD-10-CM

## 2021-07-16 DIAGNOSIS — I48.0 PAROXYSMAL ATRIAL FIBRILLATION (HCC): ICD-10-CM

## 2021-07-16 DIAGNOSIS — Z79.01 LONG TERM (CURRENT) USE OF ANTICOAGULANTS: ICD-10-CM

## 2021-07-16 DIAGNOSIS — I35.1 NONRHEUMATIC AORTIC VALVE INSUFFICIENCY: ICD-10-CM

## 2021-07-16 LAB
ANION GAP SERPL CALCULATED.3IONS-SCNC: 8.4 MMOL/L (ref 5–15)
APTT PPP: 30.4 SECONDS (ref 24–31)
BUN SERPL-MCNC: 8 MG/DL (ref 8–23)
BUN/CREAT SERPL: 10.7 (ref 7–25)
CALCIUM SPEC-SCNC: 9 MG/DL (ref 8.6–10.5)
CHLORIDE SERPL-SCNC: 105 MMOL/L (ref 98–107)
CO2 SERPL-SCNC: 27.6 MMOL/L (ref 22–29)
CREAT SERPL-MCNC: 0.75 MG/DL (ref 0.57–1)
DEPRECATED RDW RBC AUTO: 45.1 FL (ref 37–54)
ERYTHROCYTE [DISTWIDTH] IN BLOOD BY AUTOMATED COUNT: 13.1 % (ref 12.3–15.4)
GFR SERPL CREATININE-BSD FRML MDRD: 75 ML/MIN/1.73
GLUCOSE SERPL-MCNC: 91 MG/DL (ref 65–99)
HCT VFR BLD AUTO: 42.5 % (ref 34–46.6)
HGB BLD-MCNC: 13.7 G/DL (ref 12–15.9)
INR PPP: 1.12 (ref 0.93–1.1)
MCH RBC QN AUTO: 30.4 PG (ref 26.6–33)
MCHC RBC AUTO-ENTMCNC: 32.2 G/DL (ref 31.5–35.7)
MCV RBC AUTO: 94.2 FL (ref 79–97)
PLATELET # BLD AUTO: 108 10*3/MM3 (ref 140–450)
PMV BLD AUTO: 12.8 FL (ref 6–12)
POTASSIUM SERPL-SCNC: 4.1 MMOL/L (ref 3.5–5.2)
PROTHROMBIN TIME: 12.3 SECONDS (ref 9.6–11.7)
RBC # BLD AUTO: 4.51 10*6/MM3 (ref 3.77–5.28)
SARS-COV-2 ORF1AB RESP QL NAA+PROBE: NOT DETECTED
SODIUM SERPL-SCNC: 141 MMOL/L (ref 136–145)
WBC # BLD AUTO: 4.81 10*3/MM3 (ref 3.4–10.8)

## 2021-07-16 PROCEDURE — 36415 COLL VENOUS BLD VENIPUNCTURE: CPT

## 2021-07-16 PROCEDURE — C9803 HOPD COVID-19 SPEC COLLECT: HCPCS

## 2021-07-16 PROCEDURE — 85730 THROMBOPLASTIN TIME PARTIAL: CPT

## 2021-07-16 PROCEDURE — U0005 INFEC AGEN DETEC AMPLI PROBE: HCPCS

## 2021-07-16 PROCEDURE — 80048 BASIC METABOLIC PNL TOTAL CA: CPT

## 2021-07-16 PROCEDURE — 93005 ELECTROCARDIOGRAM TRACING: CPT | Performed by: INTERNAL MEDICINE

## 2021-07-16 PROCEDURE — 85027 COMPLETE CBC AUTOMATED: CPT

## 2021-07-16 PROCEDURE — U0004 COV-19 TEST NON-CDC HGH THRU: HCPCS

## 2021-07-16 PROCEDURE — 93010 ELECTROCARDIOGRAM REPORT: CPT | Performed by: INTERNAL MEDICINE

## 2021-07-16 PROCEDURE — 85610 PROTHROMBIN TIME: CPT

## 2021-07-19 ENCOUNTER — ANESTHESIA (OUTPATIENT)
Dept: CARDIOLOGY | Facility: HOSPITAL | Age: 76
End: 2021-07-19

## 2021-07-19 ENCOUNTER — HOSPITAL ENCOUNTER (OUTPATIENT)
Dept: CARDIOLOGY | Facility: HOSPITAL | Age: 76
Discharge: HOME | End: 2021-07-19
Attending: INTERNAL MEDICINE

## 2021-07-19 VITALS
SYSTOLIC BLOOD PRESSURE: 138 MMHG | DIASTOLIC BLOOD PRESSURE: 68 MMHG | HEIGHT: 62 IN | BODY MASS INDEX: 38.5 KG/M2 | TEMPERATURE: 97.7 F | RESPIRATION RATE: 19 BRPM | WEIGHT: 209.22 LBS | OXYGEN SATURATION: 95 % | HEART RATE: 67 BPM

## 2021-07-19 VITALS — SYSTOLIC BLOOD PRESSURE: 125 MMHG | OXYGEN SATURATION: 100 % | DIASTOLIC BLOOD PRESSURE: 62 MMHG

## 2021-07-19 DIAGNOSIS — I34.0 NONRHEUMATIC MITRAL VALVE REGURGITATION: ICD-10-CM

## 2021-07-19 DIAGNOSIS — D69.6 THROMBOCYTOPENIA (HCC): ICD-10-CM

## 2021-07-19 DIAGNOSIS — I10 ESSENTIAL HYPERTENSION: ICD-10-CM

## 2021-07-19 DIAGNOSIS — I35.1 NONRHEUMATIC AORTIC VALVE INSUFFICIENCY: ICD-10-CM

## 2021-07-19 DIAGNOSIS — I48.0 PAROXYSMAL ATRIAL FIBRILLATION (HCC): ICD-10-CM

## 2021-07-19 DIAGNOSIS — Z98.61 CAD S/P PERCUTANEOUS CORONARY ANGIOPLASTY: ICD-10-CM

## 2021-07-19 DIAGNOSIS — Z79.01 LONG TERM (CURRENT) USE OF ANTICOAGULANTS: ICD-10-CM

## 2021-07-19 DIAGNOSIS — E78.5 DYSLIPIDEMIA: ICD-10-CM

## 2021-07-19 DIAGNOSIS — I25.10 CAD S/P PERCUTANEOUS CORONARY ANGIOPLASTY: ICD-10-CM

## 2021-07-19 LAB
BH CV ECHO MEAS - BSA(HAYCOCK): 2.1 M^2
BH CV ECHO MEAS - BSA: 1.9 M^2
BH CV ECHO MEAS - BZI_BMI: 38.2 KILOGRAMS/M^2
BH CV ECHO MEAS - BZI_METRIC_HEIGHT: 157.5 CM
BH CV ECHO MEAS - BZI_METRIC_WEIGHT: 94.8 KG

## 2021-07-19 PROCEDURE — 93325 DOPPLER ECHO COLOR FLOW MAPG: CPT

## 2021-07-19 PROCEDURE — 25010000002 PROPOFOL 10 MG/ML EMULSION: Performed by: ANESTHESIOLOGIST ASSISTANT

## 2021-07-19 PROCEDURE — 93325 DOPPLER ECHO COLOR FLOW MAPG: CPT | Performed by: INTERNAL MEDICINE

## 2021-07-19 PROCEDURE — 93320 DOPPLER ECHO COMPLETE: CPT | Performed by: INTERNAL MEDICINE

## 2021-07-19 PROCEDURE — 93312 ECHO TRANSESOPHAGEAL: CPT

## 2021-07-19 PROCEDURE — 93312 ECHO TRANSESOPHAGEAL: CPT | Performed by: INTERNAL MEDICINE

## 2021-07-19 PROCEDURE — 93320 DOPPLER ECHO COMPLETE: CPT

## 2021-07-19 RX ORDER — SODIUM CHLORIDE 0.9 % (FLUSH) 0.9 %
10 SYRINGE (ML) INJECTION EVERY 12 HOURS SCHEDULED
Status: DISCONTINUED | OUTPATIENT
Start: 2021-07-19 | End: 2021-07-20 | Stop reason: HOSPADM

## 2021-07-19 RX ORDER — PROPOFOL 10 MG/ML
VIAL (ML) INTRAVENOUS AS NEEDED
Status: DISCONTINUED | OUTPATIENT
Start: 2021-07-19 | End: 2021-07-19 | Stop reason: SURG

## 2021-07-19 RX ORDER — SODIUM CHLORIDE 0.9 % (FLUSH) 0.9 %
10 SYRINGE (ML) INJECTION AS NEEDED
Status: DISCONTINUED | OUTPATIENT
Start: 2021-07-19 | End: 2021-07-20 | Stop reason: HOSPADM

## 2021-07-19 RX ORDER — SODIUM CHLORIDE 9 MG/ML
30 INJECTION, SOLUTION INTRAVENOUS CONTINUOUS
Status: DISCONTINUED | OUTPATIENT
Start: 2021-07-19 | End: 2021-07-20 | Stop reason: HOSPADM

## 2021-07-19 RX ADMIN — PROPOFOL 40 MG: 10 INJECTION, EMULSION INTRAVENOUS at 11:17

## 2021-07-19 RX ADMIN — PROPOFOL 80 MG: 10 INJECTION, EMULSION INTRAVENOUS at 11:15

## 2021-07-19 RX ADMIN — PROPOFOL 30 MG: 10 INJECTION, EMULSION INTRAVENOUS at 11:16

## 2021-07-19 RX ADMIN — SODIUM CHLORIDE: 0.9 INJECTION, SOLUTION INTRAVENOUS at 11:12

## 2021-07-19 RX ADMIN — PROPOFOL 40 MG: 10 INJECTION, EMULSION INTRAVENOUS at 11:19

## 2021-07-19 RX ADMIN — PROPOFOL 40 MG: 10 INJECTION, EMULSION INTRAVENOUS at 11:21

## 2021-07-19 NOTE — DISCHARGE INSTRUCTIONS
TON DC Instructions    1) The medication, which was used to put the patient to sleep, will be acting in your body for the next twenty-four (24) hours, so you might feel a little sleepy; this feeling will slowly wear off.  Because the medicine is still in your system for the next twenty-four (24) hours, the adult patient SHOULD NOT:    a. Drive a car, operate machinery, or power tools  b. Drink any alcoholic drinks (not even beer)  c. Make any important decisions such as to sign important papers    2) We strongly suggest that a responsible adult be with the patient the rest of the day.    3) Any problems with:    a. EXCESSIVE MUCOUS  b. SPITTING UP BLOOD  c. SORE THROAT AT MORE THAN 72 HOURS    NOTIFY DR. Low [phone] OR CALL THE [facility] [phone].

## 2021-07-19 NOTE — NURSING NOTE
Pt notified Dr. Low is wanting  to see patient prior to her leaving. Pt states she is ok with that.  Pt and  provided healthy choice meals and drinks.

## 2021-07-19 NOTE — ANESTHESIA PREPROCEDURE EVALUATION
Anesthesia Evaluation     Patient summary reviewed and Nursing notes reviewed   no history of anesthetic complications:  NPO Solid Status: > 8 hours  NPO Liquid Status: > 8 hours           Airway   Dental      Pulmonary    (+) sleep apnea,   Cardiovascular     ECG reviewed  PT is on anticoagulation therapy    (+) hypertension, valvular problems/murmurs MR, AI and TI, CAD, dysrhythmias Atrial Fib, PAC, angina, CHF Diastolic >=55%, ALLEN, hyperlipidemia,       Neuro/Psych  (+) dizziness/light headedness, psychiatric history Depression,     GI/Hepatic/Renal/Endo    (+) obesity,   diabetes mellitus, thyroid problem hypothyroidism    Musculoskeletal     (+) myalgias,   Abdominal    Substance History      OB/GYN          Other   arthritis, autoimmune disease lupus and Sjogren syndrome, blood dyscrasia thrombocytopenia,     ROS/Med Hx Other: Fibromyalgia, low vit D, gallstones, dermatitis, edema, fatigue, osteopenia, osteoporosis, psoriasis, Raynaud's, bursitis    Echo  Normal LV size and contractility EF of 70%  Normal RV size  Moderate left atrial enlargement seen.  Aortic valve, mitral valve, tricuspid valve appears structurally normal, mild aortic, mitral, tricuspid regurgitation seen.    No pericardial effusion seen.  Proximal aorta appears normal in size.    Stress  NUCLEAR IMAGIN.  There was  uniform uptake of Cardiolite both in resting and post stress images, no ischemia seen.  2.  Gated images reveal  normal LV size and contractility, LVEF of 74%.     CONCLUSION:  1.  Lexiscan Cardiolite with normal perfusion, negative for ischemia.  2.  Normal wall motion.  LVEF of 74%.     RECOMMENDATIONS:     Clinical correlation recommended.    [facility]  CHOLECYSTECTOMY CATARACT EXTRACTION  CARDIAC CATHETERIZATION                 Anesthesia Plan    ASA 4     MAC   (Patient identified; pre-operative vital signs, all relevant labs/studies, complete medical/surgical/anesthetic history, full medication list, full allergy list,  and NPO status obtained/reviewed; physical assessment performed; anesthetic options, side effects, potential complications, risks, and benefits discussed; questions answered; written anesthesia consent obtained; patient cleared for procedure; anesthesia machine and equipment checked and functioning)    Anesthetic plan, all risks, benefits, and alternatives have been provided, discussed and informed consent has been obtained with: patient.    Plan discussed with CRNA and CAA.

## 2021-07-19 NOTE — ANESTHESIA POSTPROCEDURE EVALUATION
Patient: Ally Ivory    Procedure Summary     Date: 07/19/21 Room / Location: [facility]    Anesthesia Start: 1112 Anesthesia Stop: 1123    Procedure: ADULT TRANSESOPHAGEAL ECHO (TON) W/ CONT IF NECESSARY PER PROTOCOL Diagnosis:       Paroxysmal atrial fibrillation (CMS/HCC)      Long term (current) use of anticoagulants      Thrombocytopenia (CMS/HCC)      CAD S/P percutaneous coronary angioplasty      Essential hypertension      Dyslipidemia      Nonrheumatic mitral valve regurgitation      Nonrheumatic aortic valve insufficiency      (Arrhythmia)      (AFib)    Scheduled Providers: Eric Low MD Provider: Lit Balbuena MD    Anesthesia Type: MAC ASA Status: 4          Anesthesia Type: MAC    Vitals  Vitals Value Taken Time   /62 07/19/21 1122   Temp     Pulse     Resp     SpO2 100 % 07/19/21 1123           Post Anesthesia Care and Evaluation    Patient location during evaluation: ICU  Patient participation: complete - patient cannot participate  Level of consciousness: obtunded/minimal responses  Pain scale: See nurse's notes for pain score.  Pain management: adequate  Airway patency: patent  Anesthetic complications: No anesthetic complications  PONV Status: none  Cardiovascular status: acceptable  Respiratory status: acceptable  Hydration status: acceptable    Comments: Patient seen and examined postoperatively; vital signs stable; SpO2 greater than or equal to 90%; cardiopulmonary status stable; nausea/vomiting adequately controlled; pain adequately controlled; no apparent anesthesia complications; patient discharged from anesthesia care when discharge criteria were met

## 2021-07-20 ENCOUNTER — TELEPHONE (OUTPATIENT)
Dept: CARDIOLOGY | Facility: CLINIC | Age: 76
End: 2021-07-20

## 2021-07-29 ENCOUNTER — TELEPHONE (OUTPATIENT)
Dept: CARDIOLOGY | Facility: CLINIC | Age: 76
End: 2021-07-29

## 2021-07-29 NOTE — TELEPHONE ENCOUNTER
TON ON 7/19/21. HER RIGHT ARM/HAND HAS BEEN GOING TO SLEEP, NUMBNESS. WHEN SHE SITS DOWN IN HER CHAIR IT GOES NUMB. MAKING HER HAVING TROUBLE SLEEPING.  COULD THIS BE RELATED TO TON? WHAT SHOULD SHE DO?

## 2021-08-02 DIAGNOSIS — Z79.01 LONG TERM (CURRENT) USE OF ANTICOAGULANTS: ICD-10-CM

## 2021-08-02 DIAGNOSIS — D69.6 THROMBOCYTOPENIA (HCC): ICD-10-CM

## 2021-08-02 DIAGNOSIS — I48.0 PAROXYSMAL ATRIAL FIBRILLATION (HCC): Primary | ICD-10-CM

## 2021-08-04 LAB — QT INTERVAL: 429 MS

## 2021-08-06 ENCOUNTER — OFFICE VISIT (OUTPATIENT)
Dept: CARDIOLOGY | Facility: CLINIC | Age: 76
End: 2021-08-06

## 2021-08-06 VITALS
BODY MASS INDEX: 38.64 KG/M2 | HEIGHT: 62 IN | WEIGHT: 210 LBS | SYSTOLIC BLOOD PRESSURE: 168 MMHG | HEART RATE: 65 BPM | DIASTOLIC BLOOD PRESSURE: 92 MMHG | OXYGEN SATURATION: 98 %

## 2021-08-06 DIAGNOSIS — I25.10 CHRONIC CORONARY ARTERY DISEASE: ICD-10-CM

## 2021-08-06 DIAGNOSIS — I10 ESSENTIAL HYPERTENSION: ICD-10-CM

## 2021-08-06 DIAGNOSIS — D69.6 THROMBOCYTOPENIA (HCC): ICD-10-CM

## 2021-08-06 DIAGNOSIS — E78.5 DYSLIPIDEMIA: ICD-10-CM

## 2021-08-06 DIAGNOSIS — I50.32 CHRONIC DIASTOLIC HEART FAILURE (HCC): Primary | ICD-10-CM

## 2021-08-06 PROCEDURE — 99203 OFFICE O/P NEW LOW 30 MIN: CPT | Performed by: INTERNAL MEDICINE

## 2021-08-06 NOTE — PROGRESS NOTES
"    Subjective:     Encounter Date:08/06/2021      Patient ID: Ally Ivory is a 75 y.o. female.    Chief Complaint: Shared Decision    History of Present Illness      75-year-old white female patient with known history of paroxysmal atrial fibrillation thrombocytopenia high bleeding risk comes to see me for shared decision regarding watchman device  [id number] score 5  Has bled score close to 3  I discussed with the patient regarding watchman device she will follow-up with her primary cardiologist all the risks and benefits will be evaluated before proceeding with watchman device  In my opinion patient will be a good candidate for watchman device especially any problems with bleeding with underlying thrombocytopenia          The following portions of the patient's history were reviewed and updated as appropriate: Allergies current medications past family history past medical history past social history past surgical history problem list and review of systems  Past Medical History:   Diagnosis Date   • Atrial fibrillation (CMS/HCC)    • Coronary artery disease    • Dyslipidemia    • Hypertension    • Hypothyroidism    • Lupus (CMS/HCC)    • Lupus (CMS/HCC)    • Raynaud's disease      Past Surgical History:   Procedure Laterality Date   • CARDIAC CATHETERIZATION     • CATARACT EXTRACTION     • CHOLECYSTECTOMY       /92 (BP Location: Left arm, Patient Position: Sitting, Cuff Size: Adult)   Pulse 65   Ht 157.5 cm (62\")   Wt 95.3 kg (210 lb)   SpO2 98%   BMI 38.41 kg/m²   History reviewed. No pertinent family history.    Current Outpatient Medications:   •  amLODIPine (NORVASC) 10 MG tablet, Take 1 tablet by mouth Daily., Disp: 90 tablet, Rfl: 3  •  apixaban (ELIQUIS) 5 MG tablet tablet, Take 5 mg by mouth Every 12 (Twelve) Hours., Disp: , Rfl:   •  cyanocobalamin 1000 MCG/ML injection, Inject 1,000 mcg into the appropriate muscle as directed by prescriber Every 30 (Thirty) Days., Disp: , Rfl: 4  •  folic " acid (FOLVITE) 800 MCG tablet, Take 800 mcg by mouth Daily. 800mcg daily, Disp: , Rfl:   •  hydroxychloroquine (PLAQUENIL) 200 MG tablet, Take 200 mg by mouth Daily., Disp: , Rfl:   •  isosorbide mononitrate (IMDUR) 30 MG 24 hr tablet, Take 30 mg by mouth Daily., Disp: , Rfl:   •  levothyroxine (SYNTHROID, LEVOTHROID) 137 MCG tablet, Take 137 mcg by mouth Daily., Disp: , Rfl:   •  PARoxetine (PAXIL) 30 MG tablet, Take 30 mg by mouth Daily., Disp: , Rfl:   •  rosuvastatin (CRESTOR) 5 MG tablet, Take 5 mg by mouth Daily., Disp: , Rfl:   Social History     Socioeconomic History   • Marital status:      Spouse name: Not on file   • Number of children: Not on file   • Years of education: Not on file   • Highest education level: Not on file   Tobacco Use   • Smoking status: Never Smoker   • Smokeless tobacco: Never Used   Vaping Use   • Vaping Use: Never used   Substance and Sexual Activity   • Alcohol use: Not Currently   • Drug use: Never   • Sexual activity: Defer     Allergies   Allergen Reactions   • Pravastatin Rash     rash     • Vancomycin Hives     Review of Systems   Constitutional: Negative for chills, fever and malaise/fatigue.   Cardiovascular: Positive for leg swelling. Negative for chest pain, dyspnea on exertion, palpitations and syncope.   Respiratory: Negative for shortness of breath.    Skin: Negative for rash.   Neurological: Positive for numbness. Negative for dizziness and light-headedness.              Objective:     Constitutional:       Appearance: Well-developed.   Eyes:      General: No scleral icterus.     Conjunctiva/sclera: Conjunctivae normal.   HENT:      Head: Normocephalic and atraumatic.    Mouth/Throat:      Mouth: No oral lesions.      Pharynx: Uvula midline.   Neck:      Thyroid: No thyromegaly.      Vascular: No carotid bruit or JVD.      Trachea: Trachea normal.   Pulmonary:      Effort: Pulmonary effort is normal.      Breath sounds: Normal breath sounds.   Cardiovascular:       Normal rate. Regular rhythm.      No gallop.   Pulses:     Intact distal pulses.   Abdominal:      General: Bowel sounds are normal.      Palpations: Abdomen is soft.   Musculoskeletal: Normal range of motion.      Cervical back: Neck supple. Skin:     General: Skin is warm. There is no cyanosis.   Neurological:      Mental Status: Alert and oriented to person, place, and time.      Comments: No focal deficits   Psychiatric:         Behavior: Behavior is cooperative.         Procedures    Lab Review:       Assessment:          Diagnosis Plan   1. Chronic diastolic heart failure (CMS/HCC)     2. Chronic coronary artery disease     3. Dyslipidemia     4. Essential hypertension     5. Thrombocytopenia (CMS/HCC)            Plan:       MDM  Number of Diagnoses or Management Options  Chronic coronary artery disease: established, improving  Chronic diastolic heart failure (CMS/HCC): established, improving  Dyslipidemia: established, improving  Essential hypertension: established, worsening  Thrombocytopenia (CMS/HCC): established, improving     Amount and/or Complexity of Data Reviewed  Clinical lab tests: reviewed  Review and summarize past medical records: yes    Risk of Complications, Morbidity, and/or Mortality  Presenting problems: moderate  Management options: moderate    Patient Progress  Patient progress: stable

## 2021-08-18 ENCOUNTER — ANESTHESIA EVENT (OUTPATIENT)
Dept: CARDIOLOGY | Facility: HOSPITAL | Age: 76
End: 2021-08-18

## 2021-08-18 ENCOUNTER — NURSE TRIAGE (OUTPATIENT)
Dept: CALL CENTER | Facility: HOSPITAL | Age: 76
End: 2021-08-18

## 2021-08-18 RX ORDER — SODIUM CHLORIDE 0.9 % (FLUSH) 0.9 %
10 SYRINGE (ML) INJECTION AS NEEDED
Status: CANCELLED | OUTPATIENT
Start: 2021-08-18

## 2021-08-18 RX ORDER — SODIUM CHLORIDE, SODIUM LACTATE, POTASSIUM CHLORIDE, CALCIUM CHLORIDE 600; 310; 30; 20 MG/100ML; MG/100ML; MG/100ML; MG/100ML
9 INJECTION, SOLUTION INTRAVENOUS CONTINUOUS PRN
Status: CANCELLED | OUTPATIENT
Start: 2021-08-18

## 2021-08-18 RX ORDER — SODIUM CHLORIDE 0.9 % (FLUSH) 0.9 %
10 SYRINGE (ML) INJECTION EVERY 12 HOURS SCHEDULED
Status: CANCELLED | OUTPATIENT
Start: 2021-08-18

## 2021-08-18 NOTE — TELEPHONE ENCOUNTER
"Caller advised she is still on the schedule for this procedure tomorrow. Advised to call office to inquire if she needs to have a pre-procedure COVID test done. As she has not had one done for this procedure. Caller agrees to follow care advice.     Reason for Disposition  • [1] Caller requesting NON-URGENT health information AND [2] PCP's office is the best resource    Additional Information  • Negative: [1] Caller is not with the adult (patient) AND [2] reporting urgent symptoms  • Negative: Lab result questions  • Negative: Medication questions  • Negative: Caller can't be reached by phone  • Negative: Caller has already spoken to PCP or another triager  • Negative: RN needs further essential information from caller in order to complete triage  • Negative: Requesting regular office appointment    Answer Assessment - Initial Assessment Questions  1. REASON FOR CALL or QUESTION: \"What is your reason for calling today?\" or \"How can I best help you?\" or \"What question do you have that I can help answer?\"      Caller asking if she is still scheduled for a Watchman procedure tomorrow    Protocols used: INFORMATION ONLY CALL - NO TRIAGE-ADULT-      "

## 2021-08-19 ENCOUNTER — HOSPITAL ENCOUNTER (OUTPATIENT)
Dept: GENERAL RADIOLOGY | Facility: HOSPITAL | Age: 76
Discharge: HOME | End: 2021-08-19

## 2021-08-19 ENCOUNTER — HOSPITAL ENCOUNTER (INPATIENT)
Facility: HOSPITAL | Age: 76
LOS: 3 days | Discharge: HOME | End: 2021-08-22
Attending: INTERNAL MEDICINE | Admitting: INTERNAL MEDICINE

## 2021-08-19 ENCOUNTER — LAB (OUTPATIENT)
Dept: LAB | Facility: HOSPITAL | Age: 76
End: 2021-08-19

## 2021-08-19 ENCOUNTER — HOSPITAL ENCOUNTER (OUTPATIENT)
Dept: CARDIOLOGY | Facility: HOSPITAL | Age: 76
Discharge: HOME | End: 2021-08-19

## 2021-08-19 ENCOUNTER — ANESTHESIA (OUTPATIENT)
Dept: CARDIOLOGY | Facility: HOSPITAL | Age: 76
End: 2021-08-19

## 2021-08-19 DIAGNOSIS — D69.6 THROMBOCYTOPENIA (HCC): ICD-10-CM

## 2021-08-19 DIAGNOSIS — I48.0 PAROXYSMAL ATRIAL FIBRILLATION (HCC): ICD-10-CM

## 2021-08-19 DIAGNOSIS — Z79.01 LONG TERM (CURRENT) USE OF ANTICOAGULANTS: ICD-10-CM

## 2021-08-19 DIAGNOSIS — R13.10 ODYNOPHAGIA: Primary | ICD-10-CM

## 2021-08-19 DIAGNOSIS — R07.9 CHEST PAIN, UNSPECIFIED TYPE: ICD-10-CM

## 2021-08-19 LAB
ACT BLD: 131 SECONDS (ref 89–137)
ACT BLD: 313 SECONDS (ref 89–137)
ANION GAP SERPL CALCULATED.3IONS-SCNC: 6 MMOL/L (ref 5–15)
ANION GAP SERPL CALCULATED.3IONS-SCNC: 7 MMOL/L (ref 5–15)
BUN SERPL-MCNC: 12 MG/DL (ref 8–23)
BUN SERPL-MCNC: 16 MG/DL (ref 8–23)
BUN/CREAT SERPL: 18.2 (ref 7–25)
BUN/CREAT SERPL: 18.8 (ref 7–25)
CALCIUM SPEC-SCNC: 8.1 MG/DL (ref 8.6–10.5)
CALCIUM SPEC-SCNC: 8.8 MG/DL (ref 8.6–10.5)
CHLORIDE SERPL-SCNC: 103 MMOL/L (ref 98–107)
CHLORIDE SERPL-SCNC: 104 MMOL/L (ref 98–107)
CO2 SERPL-SCNC: 29 MMOL/L (ref 22–29)
CO2 SERPL-SCNC: 32 MMOL/L (ref 22–29)
CREAT SERPL-MCNC: 0.66 MG/DL (ref 0.57–1)
CREAT SERPL-MCNC: 0.85 MG/DL (ref 0.57–1)
DEPRECATED RDW RBC AUTO: 45.9 FL (ref 37–54)
DEPRECATED RDW RBC AUTO: 46.4 FL (ref 37–54)
ERYTHROCYTE [DISTWIDTH] IN BLOOD BY AUTOMATED COUNT: 14.3 % (ref 12.3–15.4)
ERYTHROCYTE [DISTWIDTH] IN BLOOD BY AUTOMATED COUNT: 14.4 % (ref 12.3–15.4)
GFR SERPL CREATININE-BSD FRML MDRD: 65 ML/MIN/1.73
GFR SERPL CREATININE-BSD FRML MDRD: 87 ML/MIN/1.73
GLUCOSE SERPL-MCNC: 116 MG/DL (ref 65–99)
GLUCOSE SERPL-MCNC: 84 MG/DL (ref 65–99)
HCT VFR BLD AUTO: 40.1 % (ref 34–46.6)
HCT VFR BLD AUTO: 40.4 % (ref 34–46.6)
HGB BLD-MCNC: 13.6 G/DL (ref 12–15.9)
HGB BLD-MCNC: 13.7 G/DL (ref 12–15.9)
INR PPP: 1.01 (ref 0.93–1.1)
MCH RBC QN AUTO: 31 PG (ref 26.6–33)
MCH RBC QN AUTO: 31.7 PG (ref 26.6–33)
MCHC RBC AUTO-ENTMCNC: 33.8 G/DL (ref 31.5–35.7)
MCHC RBC AUTO-ENTMCNC: 33.9 G/DL (ref 31.5–35.7)
MCV RBC AUTO: 91.5 FL (ref 79–97)
MCV RBC AUTO: 93.7 FL (ref 79–97)
PLATELET # BLD AUTO: 101 10*3/MM3 (ref 140–450)
PLATELET # BLD AUTO: 77 10*3/MM3 (ref 140–450)
PMV BLD AUTO: 10.2 FL (ref 6–12)
PMV BLD AUTO: 9.9 FL (ref 6–12)
POTASSIUM SERPL-SCNC: 4.1 MMOL/L (ref 3.5–5.2)
POTASSIUM SERPL-SCNC: 4.5 MMOL/L (ref 3.5–5.2)
PROTHROMBIN TIME: 11.2 SECONDS (ref 9.6–11.7)
RBC # BLD AUTO: 4.32 10*6/MM3 (ref 3.77–5.28)
RBC # BLD AUTO: 4.38 10*6/MM3 (ref 3.77–5.28)
SARS-COV-2 RNA PNL SPEC NAA+PROBE: NOT DETECTED
SODIUM SERPL-SCNC: 140 MMOL/L (ref 136–145)
SODIUM SERPL-SCNC: 141 MMOL/L (ref 136–145)
WBC # BLD AUTO: 12.5 10*3/MM3 (ref 3.4–10.8)
WBC # BLD AUTO: 5.3 10*3/MM3 (ref 3.4–10.8)

## 2021-08-19 PROCEDURE — 80048 BASIC METABOLIC PNL TOTAL CA: CPT | Performed by: INTERNAL MEDICINE

## 2021-08-19 PROCEDURE — 25010000002 MIDAZOLAM PER 1 MG: Performed by: NURSE ANESTHETIST, CERTIFIED REGISTERED

## 2021-08-19 PROCEDURE — C1769 GUIDE WIRE: HCPCS | Performed by: INTERNAL MEDICINE

## 2021-08-19 PROCEDURE — 85347 COAGULATION TIME ACTIVATED: CPT

## 2021-08-19 PROCEDURE — 80048 BASIC METABOLIC PNL TOTAL CA: CPT

## 2021-08-19 PROCEDURE — C1894 INTRO/SHEATH, NON-LASER: HCPCS | Performed by: INTERNAL MEDICINE

## 2021-08-19 PROCEDURE — 87635 SARS-COV-2 COVID-19 AMP PRB: CPT

## 2021-08-19 PROCEDURE — 0 IOPAMIDOL PER 1 ML: Performed by: INTERNAL MEDICINE

## 2021-08-19 PROCEDURE — 36415 COLL VENOUS BLD VENIPUNCTURE: CPT

## 2021-08-19 PROCEDURE — 25010000002 ONDANSETRON PER 1 MG: Performed by: NURSE ANESTHETIST, CERTIFIED REGISTERED

## 2021-08-19 PROCEDURE — 02L73DK OCCLUSION OF LEFT ATRIAL APPENDAGE WITH INTRALUMINAL DEVICE, PERCUTANEOUS APPROACH: ICD-10-PCS | Performed by: INTERNAL MEDICINE

## 2021-08-19 PROCEDURE — 33340 PERQ CLSR TCAT L ATR APNDGE: CPT | Performed by: INTERNAL MEDICINE

## 2021-08-19 PROCEDURE — C1889 IMPLANT/INSERT DEVICE, NOC: HCPCS

## 2021-08-19 PROCEDURE — 85027 COMPLETE CBC AUTOMATED: CPT | Performed by: INTERNAL MEDICINE

## 2021-08-19 PROCEDURE — 25010000002 NEOSTIGMINE 5 MG/5ML SOLUTION: Performed by: NURSE ANESTHETIST, CERTIFIED REGISTERED

## 2021-08-19 PROCEDURE — 25010000002 PROPOFOL 10 MG/ML EMULSION: Performed by: NURSE ANESTHETIST, CERTIFIED REGISTERED

## 2021-08-19 PROCEDURE — 25010000002 MORPHINE PER 10 MG: Performed by: INTERNAL MEDICINE

## 2021-08-19 PROCEDURE — C1893 INTRO/SHEATH, FIXED,NON-PEEL: HCPCS | Performed by: INTERNAL MEDICINE

## 2021-08-19 PROCEDURE — 25010000002 HEPARIN (PORCINE) PER 1000 UNITS: Performed by: NURSE ANESTHETIST, CERTIFIED REGISTERED

## 2021-08-19 PROCEDURE — B24BZZ4 ULTRASONOGRAPHY OF HEART WITH AORTA, TRANSESOPHAGEAL: ICD-10-PCS | Performed by: INTERNAL MEDICINE

## 2021-08-19 PROCEDURE — 85027 COMPLETE CBC AUTOMATED: CPT

## 2021-08-19 PROCEDURE — 93355 ECHO TRANSESOPHAGEAL (TEE): CPT

## 2021-08-19 PROCEDURE — 25010000002 PROTAMINE SULFATE PER 10 MG: Performed by: NURSE ANESTHETIST, CERTIFIED REGISTERED

## 2021-08-19 PROCEDURE — 93355 ECHO TRANSESOPHAGEAL (TEE): CPT | Performed by: INTERNAL MEDICINE

## 2021-08-19 PROCEDURE — 25010000002 FENTANYL CITRATE (PF) 100 MCG/2ML SOLUTION: Performed by: NURSE ANESTHETIST, CERTIFIED REGISTERED

## 2021-08-19 PROCEDURE — C1889 IMPLANT/INSERT DEVICE, NOC: HCPCS | Performed by: INTERNAL MEDICINE

## 2021-08-19 PROCEDURE — 85610 PROTHROMBIN TIME: CPT

## 2021-08-19 PROCEDURE — 71046 X-RAY EXAM CHEST 2 VIEWS: CPT

## 2021-08-19 DEVICE — LEFT ATRIAL APPENDAGE CLOSURE DEVICE WITH DELIVERY SYSTEM
Type: IMPLANTABLE DEVICE | Status: FUNCTIONAL
Brand: WATCHMAN FLX™

## 2021-08-19 RX ORDER — IPRATROPIUM BROMIDE AND ALBUTEROL SULFATE 2.5; .5 MG/3ML; MG/3ML
3 SOLUTION RESPIRATORY (INHALATION) ONCE AS NEEDED
Status: DISCONTINUED | OUTPATIENT
Start: 2021-08-19 | End: 2021-08-19 | Stop reason: HOSPADM

## 2021-08-19 RX ORDER — FENTANYL CITRATE 50 UG/ML
INJECTION, SOLUTION INTRAMUSCULAR; INTRAVENOUS AS NEEDED
Status: DISCONTINUED | OUTPATIENT
Start: 2021-08-19 | End: 2021-08-19 | Stop reason: SURG

## 2021-08-19 RX ORDER — ACETAMINOPHEN 650 MG/1
650 SUPPOSITORY RECTAL ONCE AS NEEDED
Status: DISCONTINUED | OUTPATIENT
Start: 2021-08-19 | End: 2021-08-19 | Stop reason: HOSPADM

## 2021-08-19 RX ORDER — LABETALOL HYDROCHLORIDE 5 MG/ML
5 INJECTION, SOLUTION INTRAVENOUS
Status: DISCONTINUED | OUTPATIENT
Start: 2021-08-19 | End: 2021-08-19 | Stop reason: HOSPADM

## 2021-08-19 RX ORDER — NALOXONE HCL 0.4 MG/ML
0.4 VIAL (ML) INJECTION
Status: DISCONTINUED | OUTPATIENT
Start: 2021-08-19 | End: 2021-08-22 | Stop reason: HOSPADM

## 2021-08-19 RX ORDER — MORPHINE SULFATE 4 MG/ML
1 INJECTION, SOLUTION INTRAMUSCULAR; INTRAVENOUS EVERY 4 HOURS PRN
Status: DISCONTINUED | OUTPATIENT
Start: 2021-08-19 | End: 2021-08-22 | Stop reason: HOSPADM

## 2021-08-19 RX ORDER — PROTAMINE SULFATE 10 MG/ML
INJECTION, SOLUTION INTRAVENOUS AS NEEDED
Status: DISCONTINUED | OUTPATIENT
Start: 2021-08-19 | End: 2021-08-19 | Stop reason: SURG

## 2021-08-19 RX ORDER — EPHEDRINE SULFATE 50 MG/ML
5 INJECTION, SOLUTION INTRAVENOUS ONCE AS NEEDED
Status: DISCONTINUED | OUTPATIENT
Start: 2021-08-19 | End: 2021-08-19 | Stop reason: HOSPADM

## 2021-08-19 RX ORDER — NEOSTIGMINE METHYLSULFATE 5 MG/5 ML
SYRINGE (ML) INTRAVENOUS AS NEEDED
Status: DISCONTINUED | OUTPATIENT
Start: 2021-08-19 | End: 2021-08-19 | Stop reason: SURG

## 2021-08-19 RX ORDER — PROPOFOL 10 MG/ML
VIAL (ML) INTRAVENOUS AS NEEDED
Status: DISCONTINUED | OUTPATIENT
Start: 2021-08-19 | End: 2021-08-19 | Stop reason: SURG

## 2021-08-19 RX ORDER — FENTANYL CITRATE 50 UG/ML
50 INJECTION, SOLUTION INTRAMUSCULAR; INTRAVENOUS
Status: DISCONTINUED | OUTPATIENT
Start: 2021-08-19 | End: 2021-08-19 | Stop reason: HOSPADM

## 2021-08-19 RX ORDER — ONDANSETRON 2 MG/ML
4 INJECTION INTRAMUSCULAR; INTRAVENOUS ONCE AS NEEDED
Status: DISCONTINUED | OUTPATIENT
Start: 2021-08-19 | End: 2021-08-19 | Stop reason: HOSPADM

## 2021-08-19 RX ORDER — SODIUM CHLORIDE 0.9 % (FLUSH) 0.9 %
10 SYRINGE (ML) INJECTION EVERY 12 HOURS SCHEDULED
Status: DISCONTINUED | OUTPATIENT
Start: 2021-08-19 | End: 2021-08-19 | Stop reason: HOSPADM

## 2021-08-19 RX ORDER — LIDOCAINE HYDROCHLORIDE 20 MG/ML
INJECTION, SOLUTION EPIDURAL; INFILTRATION; INTRACAUDAL; PERINEURAL AS NEEDED
Status: DISCONTINUED | OUTPATIENT
Start: 2021-08-19 | End: 2021-08-19 | Stop reason: SURG

## 2021-08-19 RX ORDER — ACETAMINOPHEN 325 MG/1
650 TABLET ORAL ONCE AS NEEDED
Status: DISCONTINUED | OUTPATIENT
Start: 2021-08-19 | End: 2021-08-19 | Stop reason: HOSPADM

## 2021-08-19 RX ORDER — AMLODIPINE BESYLATE 5 MG/1
10 TABLET ORAL DAILY
Status: DISCONTINUED | OUTPATIENT
Start: 2021-08-20 | End: 2021-08-22 | Stop reason: HOSPADM

## 2021-08-19 RX ORDER — ACETAMINOPHEN 650 MG/1
650 SUPPOSITORY RECTAL EVERY 4 HOURS PRN
Status: DISCONTINUED | OUTPATIENT
Start: 2021-08-19 | End: 2021-08-22 | Stop reason: HOSPADM

## 2021-08-19 RX ORDER — ROCURONIUM BROMIDE 10 MG/ML
INJECTION, SOLUTION INTRAVENOUS AS NEEDED
Status: DISCONTINUED | OUTPATIENT
Start: 2021-08-19 | End: 2021-08-19 | Stop reason: SURG

## 2021-08-19 RX ORDER — GLYCOPYRROLATE 1 MG/5 ML
SYRINGE (ML) INTRAVENOUS AS NEEDED
Status: DISCONTINUED | OUTPATIENT
Start: 2021-08-19 | End: 2021-08-19 | Stop reason: SURG

## 2021-08-19 RX ORDER — DROPERIDOL 2.5 MG/ML
0.62 INJECTION, SOLUTION INTRAMUSCULAR; INTRAVENOUS ONCE AS NEEDED
Status: DISCONTINUED | OUTPATIENT
Start: 2021-08-19 | End: 2021-08-19 | Stop reason: HOSPADM

## 2021-08-19 RX ORDER — FENTANYL CITRATE 50 UG/ML
25 INJECTION, SOLUTION INTRAMUSCULAR; INTRAVENOUS
Status: DISCONTINUED | OUTPATIENT
Start: 2021-08-19 | End: 2021-08-19 | Stop reason: HOSPADM

## 2021-08-19 RX ORDER — ACETAMINOPHEN 325 MG/1
650 TABLET ORAL EVERY 4 HOURS PRN
Status: DISCONTINUED | OUTPATIENT
Start: 2021-08-19 | End: 2021-08-22 | Stop reason: HOSPADM

## 2021-08-19 RX ORDER — SODIUM CHLORIDE 9 MG/ML
30 INJECTION, SOLUTION INTRAVENOUS CONTINUOUS
Status: DISCONTINUED | OUTPATIENT
Start: 2021-08-19 | End: 2021-08-21

## 2021-08-19 RX ORDER — PHENYLEPHRINE HCL IN 0.9% NACL 1 MG/10 ML
SYRINGE (ML) INTRAVENOUS AS NEEDED
Status: DISCONTINUED | OUTPATIENT
Start: 2021-08-19 | End: 2021-08-19 | Stop reason: SURG

## 2021-08-19 RX ORDER — SODIUM CHLORIDE 9 MG/ML
100 INJECTION, SOLUTION INTRAVENOUS CONTINUOUS
Status: DISCONTINUED | OUTPATIENT
Start: 2021-08-19 | End: 2021-08-21

## 2021-08-19 RX ORDER — DROPERIDOL 2.5 MG/ML
1.25 INJECTION, SOLUTION INTRAMUSCULAR; INTRAVENOUS ONCE AS NEEDED
Status: DISCONTINUED | OUTPATIENT
Start: 2021-08-19 | End: 2021-08-19 | Stop reason: HOSPADM

## 2021-08-19 RX ORDER — ONDANSETRON 2 MG/ML
INJECTION INTRAMUSCULAR; INTRAVENOUS AS NEEDED
Status: DISCONTINUED | OUTPATIENT
Start: 2021-08-19 | End: 2021-08-19 | Stop reason: SURG

## 2021-08-19 RX ORDER — PAROXETINE HYDROCHLORIDE 20 MG/1
30 TABLET, FILM COATED ORAL DAILY
Status: DISCONTINUED | OUTPATIENT
Start: 2021-08-20 | End: 2021-08-22 | Stop reason: HOSPADM

## 2021-08-19 RX ORDER — SODIUM CHLORIDE 9 MG/ML
INJECTION, SOLUTION INTRAVENOUS CONTINUOUS PRN
Status: DISCONTINUED | OUTPATIENT
Start: 2021-08-19 | End: 2021-08-19 | Stop reason: SURG

## 2021-08-19 RX ORDER — SODIUM CHLORIDE, SODIUM LACTATE, POTASSIUM CHLORIDE, CALCIUM CHLORIDE 600; 310; 30; 20 MG/100ML; MG/100ML; MG/100ML; MG/100ML
9 INJECTION, SOLUTION INTRAVENOUS CONTINUOUS PRN
Status: DISCONTINUED | OUTPATIENT
Start: 2021-08-19 | End: 2021-08-19 | Stop reason: HOSPADM

## 2021-08-19 RX ORDER — ISOSORBIDE MONONITRATE 30 MG/1
30 TABLET, EXTENDED RELEASE ORAL DAILY
Status: DISCONTINUED | OUTPATIENT
Start: 2021-08-20 | End: 2021-08-22 | Stop reason: HOSPADM

## 2021-08-19 RX ORDER — MIDAZOLAM HYDROCHLORIDE 1 MG/ML
INJECTION INTRAMUSCULAR; INTRAVENOUS AS NEEDED
Status: DISCONTINUED | OUTPATIENT
Start: 2021-08-19 | End: 2021-08-19 | Stop reason: SURG

## 2021-08-19 RX ORDER — HEPARIN SODIUM 1000 [USP'U]/ML
INJECTION, SOLUTION INTRAVENOUS; SUBCUTANEOUS AS NEEDED
Status: DISCONTINUED | OUTPATIENT
Start: 2021-08-19 | End: 2021-08-19 | Stop reason: SURG

## 2021-08-19 RX ORDER — CYANOCOBALAMIN 1000 UG/ML
1000 INJECTION, SOLUTION INTRAMUSCULAR; SUBCUTANEOUS
Status: DISCONTINUED | OUTPATIENT
Start: 2021-09-01 | End: 2021-08-22 | Stop reason: HOSPADM

## 2021-08-19 RX ORDER — SODIUM CHLORIDE 0.9 % (FLUSH) 0.9 %
10 SYRINGE (ML) INJECTION AS NEEDED
Status: DISCONTINUED | OUTPATIENT
Start: 2021-08-19 | End: 2021-08-19 | Stop reason: HOSPADM

## 2021-08-19 RX ORDER — LIDOCAINE HYDROCHLORIDE 20 MG/ML
INJECTION, SOLUTION INFILTRATION; PERINEURAL AS NEEDED
Status: DISCONTINUED | OUTPATIENT
Start: 2021-08-19 | End: 2021-08-19 | Stop reason: HOSPADM

## 2021-08-19 RX ORDER — MIDAZOLAM HYDROCHLORIDE 1 MG/ML
1 INJECTION INTRAMUSCULAR; INTRAVENOUS
Status: DISCONTINUED | OUTPATIENT
Start: 2021-08-19 | End: 2021-08-19 | Stop reason: HOSPADM

## 2021-08-19 RX ORDER — ROSUVASTATIN CALCIUM 5 MG/1
5 TABLET, COATED ORAL DAILY
Status: DISCONTINUED | OUTPATIENT
Start: 2021-08-20 | End: 2021-08-22 | Stop reason: HOSPADM

## 2021-08-19 RX ORDER — LORAZEPAM 2 MG/ML
1 INJECTION INTRAMUSCULAR
Status: DISCONTINUED | OUTPATIENT
Start: 2021-08-19 | End: 2021-08-19 | Stop reason: HOSPADM

## 2021-08-19 RX ADMIN — SODIUM CHLORIDE: 0.9 INJECTION, SOLUTION INTRAVENOUS at 10:56

## 2021-08-19 RX ADMIN — LIDOCAINE HYDROCHLORIDE 100 MG: 20 INJECTION, SOLUTION EPIDURAL; INFILTRATION; INTRACAUDAL; PERINEURAL at 10:58

## 2021-08-19 RX ADMIN — HEPARIN SODIUM 5000 UNITS: 1000 INJECTION, SOLUTION INTRAVENOUS; SUBCUTANEOUS at 11:23

## 2021-08-19 RX ADMIN — SODIUM CHLORIDE 100 ML/HR: 9 INJECTION, SOLUTION INTRAVENOUS at 14:33

## 2021-08-19 RX ADMIN — FENTANYL CITRATE 50 MCG: 50 INJECTION, SOLUTION INTRAMUSCULAR; INTRAVENOUS at 11:59

## 2021-08-19 RX ADMIN — PROPOFOL 150 MCG/KG/MIN: 10 INJECTION, EMULSION INTRAVENOUS at 11:03

## 2021-08-19 RX ADMIN — SODIUM CHLORIDE: 0.9 INJECTION, SOLUTION INTRAVENOUS at 11:31

## 2021-08-19 RX ADMIN — MORPHINE SULFATE 1 MG: 4 INJECTION INTRAVENOUS at 15:31

## 2021-08-19 RX ADMIN — ONDANSETRON 4 MG: 2 INJECTION INTRAMUSCULAR; INTRAVENOUS at 11:35

## 2021-08-19 RX ADMIN — HEPARIN SODIUM 5000 UNITS: 1000 INJECTION, SOLUTION INTRAVENOUS; SUBCUTANEOUS at 11:20

## 2021-08-19 RX ADMIN — Medication 5 MG: at 11:50

## 2021-08-19 RX ADMIN — PROPOFOL 150 MG: 10 INJECTION, EMULSION INTRAVENOUS at 10:58

## 2021-08-19 RX ADMIN — Medication 10 ML: at 14:33

## 2021-08-19 RX ADMIN — MORPHINE SULFATE 1 MG: 4 INJECTION INTRAVENOUS at 19:37

## 2021-08-19 RX ADMIN — PROTAMINE SULFATE 100 MG: 10 INJECTION, SOLUTION INTRAVENOUS at 11:36

## 2021-08-19 RX ADMIN — Medication 100 MCG: at 11:27

## 2021-08-19 RX ADMIN — Medication 0.8 MG: at 11:50

## 2021-08-19 RX ADMIN — ROCURONIUM BROMIDE 50 MG: 10 INJECTION INTRAVENOUS at 10:58

## 2021-08-19 RX ADMIN — FENTANYL CITRATE 50 MCG: 50 INJECTION, SOLUTION INTRAMUSCULAR; INTRAVENOUS at 11:17

## 2021-08-19 RX ADMIN — MIDAZOLAM 2 MG: 1 INJECTION INTRAMUSCULAR; INTRAVENOUS at 11:05

## 2021-08-19 NOTE — ANESTHESIA POSTPROCEDURE EVALUATION
Patient: Ally Ivory    Procedure Summary     Date: 08/19/21 Room / Location: [place] CATH LAB 3 / [facility] CATH INVASIVE LOCATION    Anesthesia Start: 1042 Anesthesia Stop: 1211    Procedures:       Atrial Appendage Occlusion (N/A )      Atrial Appendage Occlusion (N/A ) Diagnosis:       Paroxysmal atrial fibrillation (CMS/HCC)      Thrombocytopenia (CMS/HCC)      Long term (current) use of anticoagulants      (Status post watchman device implantation without complications)    Providers: Eric Low MD; Francisco Garza MD Provider: Mejia Huddleston MD    Anesthesia Type: general ASA Status: 4          Anesthesia Type: general    Vitals  Vitals Value Taken Time   /66 08/19/21 1320   Temp 97.8 °F (36.6 °C) 08/19/21 1320   Pulse 68 08/19/21 1320   Resp 15 08/19/21 1320   SpO2 96 % 08/19/21 1320           Post Anesthesia Care and Evaluation    Patient location during evaluation: PACU  Patient participation: complete - patient participated  Level of consciousness: awake  Pain scale: See nurse's notes for pain score.  Pain management: adequate  Airway patency: patent  Anesthetic complications: No anesthetic complications  PONV Status: none  Cardiovascular status: acceptable  Respiratory status: acceptable  Hydration status: acceptable    Comments: Patient seen and examined postoperatively; vital signs stable; SpO2 greater than or equal to 90%; cardiopulmonary status stable; nausea/vomiting adequately controlled; pain adequately controlled; no apparent anesthesia complications; patient discharged from anesthesia care when discharge criteria were met

## 2021-08-19 NOTE — ANESTHESIA PREPROCEDURE EVALUATION
Anesthesia Evaluation     Patient summary reviewed and Nursing notes reviewed   no history of anesthetic complications:  NPO Solid Status: > 8 hours  NPO Liquid Status: > 8 hours           Airway   Mallampati: II  TM distance: >3 FB  Neck ROM: full  No difficulty expected  Dental - normal exam     Pulmonary - normal exam   (+) sleep apnea,   Cardiovascular - normal exam    ECG reviewed  PT is on anticoagulation therapy    (+) hypertension, valvular problems/murmurs MR, AI and TI, CAD, dysrhythmias Atrial Fib, PAC, angina, CHF Diastolic >=55%, ALLEN, hyperlipidemia,       Neuro/Psych  (+) dizziness/light headedness, psychiatric history Depression,     GI/Hepatic/Renal/Endo    (+) obesity,   diabetes mellitus, thyroid problem hypothyroidism    Musculoskeletal     (+) myalgias,   Abdominal  - normal exam   Substance History      OB/GYN          Other   arthritis, autoimmune disease lupus and Sjogren syndrome, blood dyscrasia thrombocytopenia,     ROS/Med Hx Other: Fibromyalgia, low vit D, gallstones, dermatitis, edema, fatigue, osteopenia, osteoporosis, psoriasis, Raynaud's, bursitis    Echo  Normal LV size and contractility EF of 70%  Normal RV size  Moderate left atrial enlargement seen.  Aortic valve, mitral valve, tricuspid valve appears structurally normal, mild aortic, mitral, tricuspid regurgitation seen.    No pericardial effusion seen.  Proximal aorta appears normal in size.    Stress  NUCLEAR IMAGIN.  There was  uniform uptake of Cardiolite both in resting and post stress images, no ischemia seen.  2.  Gated images reveal  normal LV size and contractility, LVEF of 74%.     CONCLUSION:  1.  Lexiscan Cardiolite with normal perfusion, negative for ischemia.  2.  Normal wall motion.  LVEF of 74%.     RECOMMENDATIONS:     Clinical correlation recommended.    [facility]  CHOLECYSTECTOMY CATARACT EXTRACTION  CARDIAC CATHETERIZATION                   Anesthesia Plan    ASA 4     general   (Patient identified;  pre-operative vital signs, all relevant labs/studies, complete medical/surgical/anesthetic history, full medication list, full allergy list, and NPO status obtained/reviewed; physical assessment performed; anesthetic options, side effects, potential complications, risks, and benefits discussed; questions answered; written anesthesia consent obtained; patient cleared for procedure; anesthesia machine and equipment checked and functioning)  intravenous induction     Anesthetic plan, all risks, benefits, and alternatives have been provided, discussed and informed consent has been obtained with: patient.    Plan discussed with CRNA.

## 2021-08-19 NOTE — ANESTHESIA PROCEDURE NOTES
Airway  Urgency: elective    Date/Time: 8/19/2021 11:01 AM  Airway not difficult    General Information and Staff    Patient location during procedure: OR  Anesthesiologist: Mejia Huddleston MD  CRNA: Uma Doan CRNA    Indications and Patient Condition  Indications for airway management: airway protection    Preoxygenated: yes  MILS maintained throughout  Mask difficulty assessment: 2 - vent by mask + OA or adjuvant +/- NMBA    Final Airway Details  Final airway type: endotracheal airway      Successful airway: ETT  Cuffed: yes   Successful intubation technique: direct laryngoscopy  Facilitating devices/methods: intubating stylet  Endotracheal tube insertion site: oral  Blade: Ernestine  Blade size: 3  ETT size (mm): 7.0  Cormack-Lehane Classification: grade I - full view of glottis  Placement verified by: chest auscultation and capnometry   Measured from: lips  ETT/EBT  to lips (cm): 20  Number of attempts at approach: 1  Assessment: lips, teeth, and gum same as pre-op and atraumatic intubation

## 2021-08-20 ENCOUNTER — ANESTHESIA (OUTPATIENT)
Dept: GASTROENTEROLOGY | Facility: HOSPITAL | Age: 76
End: 2021-08-20

## 2021-08-20 ENCOUNTER — APPOINTMENT (OUTPATIENT)
Dept: CT IMAGING | Facility: HOSPITAL | Age: 76
End: 2021-08-20

## 2021-08-20 ENCOUNTER — ANESTHESIA EVENT (OUTPATIENT)
Dept: GASTROENTEROLOGY | Facility: HOSPITAL | Age: 76
End: 2021-08-20

## 2021-08-20 ENCOUNTER — INPATIENT HOSPITAL (OUTPATIENT)
Dept: URBAN - METROPOLITAN AREA HOSPITAL 84 | Facility: HOSPITAL | Age: 76
End: 2021-08-20
Payer: COMMERCIAL

## 2021-08-20 DIAGNOSIS — R13.19 OTHER DYSPHAGIA: ICD-10-CM

## 2021-08-20 DIAGNOSIS — K22.8 OTHER SPECIFIED DISEASES OF ESOPHAGUS: ICD-10-CM

## 2021-08-20 DIAGNOSIS — R07.89 OTHER CHEST PAIN: ICD-10-CM

## 2021-08-20 DIAGNOSIS — D69.6 THROMBOCYTOPENIA, UNSPECIFIED: ICD-10-CM

## 2021-08-20 DIAGNOSIS — I48.91 UNSPECIFIED ATRIAL FIBRILLATION: ICD-10-CM

## 2021-08-20 PROBLEM — R13.10 ODYNOPHAGIA: Status: ACTIVE | Noted: 2021-08-04

## 2021-08-20 PROCEDURE — 93010 ELECTROCARDIOGRAM REPORT: CPT | Performed by: INTERNAL MEDICINE

## 2021-08-20 PROCEDURE — 99232 SBSQ HOSP IP/OBS MODERATE 35: CPT | Performed by: INTERNAL MEDICINE

## 2021-08-20 PROCEDURE — 25010000002 MORPHINE PER 10 MG: Performed by: INTERNAL MEDICINE

## 2021-08-20 PROCEDURE — 93005 ELECTROCARDIOGRAM TRACING: CPT | Performed by: INTERNAL MEDICINE

## 2021-08-20 PROCEDURE — 43235 EGD DIAGNOSTIC BRUSH WASH: CPT | Performed by: INTERNAL MEDICINE

## 2021-08-20 PROCEDURE — 71250 CT THORAX DX C-: CPT

## 2021-08-20 PROCEDURE — 0DJ08ZZ INSPECTION OF UPPER INTESTINAL TRACT, VIA NATURAL OR ARTIFICIAL OPENING ENDOSCOPIC: ICD-10-PCS | Performed by: INTERNAL MEDICINE

## 2021-08-20 PROCEDURE — 25010000002 PROPOFOL 10 MG/ML EMULSION: Performed by: ANESTHESIOLOGY

## 2021-08-20 PROCEDURE — 99222 1ST HOSP IP/OBS MODERATE 55: CPT | Mod: 25 | Performed by: NURSE PRACTITIONER

## 2021-08-20 RX ORDER — ONDANSETRON 4 MG/1
4 TABLET, FILM COATED ORAL EVERY 6 HOURS PRN
Status: DISCONTINUED | OUTPATIENT
Start: 2021-08-20 | End: 2021-08-22 | Stop reason: HOSPADM

## 2021-08-20 RX ORDER — ONDANSETRON 2 MG/ML
4 INJECTION INTRAMUSCULAR; INTRAVENOUS EVERY 6 HOURS PRN
Status: DISCONTINUED | OUTPATIENT
Start: 2021-08-20 | End: 2021-08-22 | Stop reason: HOSPADM

## 2021-08-20 RX ORDER — SODIUM CHLORIDE, SODIUM LACTATE, POTASSIUM CHLORIDE, CALCIUM CHLORIDE 600; 310; 30; 20 MG/100ML; MG/100ML; MG/100ML; MG/100ML
75 INJECTION, SOLUTION INTRAVENOUS CONTINUOUS PRN
Status: DISCONTINUED | OUTPATIENT
Start: 2021-08-20 | End: 2021-08-21

## 2021-08-20 RX ORDER — LIDOCAINE HYDROCHLORIDE 20 MG/ML
10 SOLUTION OROPHARYNGEAL ONCE
Status: COMPLETED | OUTPATIENT
Start: 2021-08-20 | End: 2021-08-20

## 2021-08-20 RX ORDER — SODIUM CHLORIDE 0.9 % (FLUSH) 0.9 %
10 SYRINGE (ML) INJECTION EVERY 12 HOURS SCHEDULED
Status: DISCONTINUED | OUTPATIENT
Start: 2021-08-20 | End: 2021-08-20

## 2021-08-20 RX ORDER — SODIUM CHLORIDE 0.9 % (FLUSH) 0.9 %
10 SYRINGE (ML) INJECTION AS NEEDED
Status: DISCONTINUED | OUTPATIENT
Start: 2021-08-20 | End: 2021-08-20

## 2021-08-20 RX ORDER — SODIUM CHLORIDE 0.9 % (FLUSH) 0.9 %
3 SYRINGE (ML) INJECTION EVERY 12 HOURS SCHEDULED
Status: DISCONTINUED | OUTPATIENT
Start: 2021-08-20 | End: 2021-08-20

## 2021-08-20 RX ORDER — LIDOCAINE HYDROCHLORIDE 10 MG/ML
INJECTION, SOLUTION EPIDURAL; INFILTRATION; INTRACAUDAL; PERINEURAL AS NEEDED
Status: DISCONTINUED | OUTPATIENT
Start: 2021-08-20 | End: 2021-08-20 | Stop reason: SURG

## 2021-08-20 RX ORDER — SODIUM CHLORIDE 0.9 % (FLUSH) 0.9 %
3-10 SYRINGE (ML) INJECTION AS NEEDED
Status: DISCONTINUED | OUTPATIENT
Start: 2021-08-20 | End: 2021-08-20

## 2021-08-20 RX ORDER — PROPOFOL 10 MG/ML
VIAL (ML) INTRAVENOUS AS NEEDED
Status: DISCONTINUED | OUTPATIENT
Start: 2021-08-20 | End: 2021-08-20 | Stop reason: SURG

## 2021-08-20 RX ADMIN — LEVOTHYROXINE SODIUM 137 MCG: 0.11 TABLET ORAL at 06:02

## 2021-08-20 RX ADMIN — PROPOFOL 50 MG: 10 INJECTION, EMULSION INTRAVENOUS at 15:51

## 2021-08-20 RX ADMIN — MORPHINE SULFATE 1 MG: 4 INJECTION INTRAVENOUS at 08:08

## 2021-08-20 RX ADMIN — SODIUM CHLORIDE 30 ML/HR: 9 INJECTION, SOLUTION INTRAVENOUS at 14:02

## 2021-08-20 RX ADMIN — MORPHINE SULFATE 1 MG: 4 INJECTION INTRAVENOUS at 18:12

## 2021-08-20 RX ADMIN — LIDOCAINE HYDROCHLORIDE 50 MG: 10 INJECTION, SOLUTION EPIDURAL; INFILTRATION; INTRACAUDAL; PERINEURAL at 15:45

## 2021-08-20 RX ADMIN — LIDOCAINE HYDROCHLORIDE 10 ML: 20 SOLUTION ORAL; TOPICAL at 08:45

## 2021-08-20 RX ADMIN — SODIUM CHLORIDE 100 ML/HR: 9 INJECTION, SOLUTION INTRAVENOUS at 01:53

## 2021-08-20 RX ADMIN — SODIUM CHLORIDE 100 ML/HR: 9 INJECTION, SOLUTION INTRAVENOUS at 18:11

## 2021-08-20 RX ADMIN — MORPHINE SULFATE 1 MG: 4 INJECTION INTRAVENOUS at 13:02

## 2021-08-20 RX ADMIN — PROPOFOL 100 MG: 10 INJECTION, EMULSION INTRAVENOUS at 15:46

## 2021-08-20 RX ADMIN — MORPHINE SULFATE 1 MG: 4 INJECTION INTRAVENOUS at 21:43

## 2021-08-20 NOTE — ANESTHESIA PREPROCEDURE EVALUATION
Anesthesia Evaluation     Patient summary reviewed and Nursing notes reviewed   NPO Solid Status: > 6 hours  NPO Liquid Status: > 6 hours           Airway   Mallampati: I  TM distance: >3 FB  Neck ROM: full  No difficulty expected  Dental - normal exam   (+) partials    Pulmonary - normal exam   (+) shortness of breath, sleep apnea,   Cardiovascular - normal exam    ECG reviewed  PT is on anticoagulation therapy    (+) hypertension, valvular problems/murmurs, CAD, cardiac stents unknown timeframe dysrhythmias Atrial Fib, angina with exertion, hyperlipidemia,       Neuro/Psych  (+) dizziness/light headedness, psychiatric history,     GI/Hepatic/Renal/Endo    (+)   diabetes mellitus type 2 well controlled,     Musculoskeletal     Abdominal  - normal exam    Bowel sounds: normal.   Substance History - negative use     OB/GYN negative ob/gyn ROS         Other   arthritis,                      Anesthesia Plan    ASA 3     MAC   total IV anesthesia  intravenous induction     Anesthetic plan, all risks, benefits, and alternatives have been provided, discussed and informed consent has been obtained with: patient.    Plan discussed with CAA and CRNA.

## 2021-08-20 NOTE — ANESTHESIA POSTPROCEDURE EVALUATION
Patient: Ally Ivory    Procedure Summary     Date: 08/20/21 Room / Location: [facility] / [facility]    Anesthesia Start: 1531 Anesthesia Stop: 1557    Procedure: ESOPHAGOGASTRODUODENOSCOPY (N/A ) Diagnosis:       Odynophagia      Chest pain, unspecified type      (Odynophagia [R13.10])      (Chest pain, unspecified type [R07.9])    Surgeons: Joao Cross MD Provider: Martin Amato DO    Anesthesia Type: MAC ASA Status: 3          Anesthesia Type: MAC    Vitals  No vitals data found for the desired time range.          Post Anesthesia Care and Evaluation    Patient location during evaluation: PACU  Patient participation: complete - patient participated  Level of consciousness: awake  Pain scale: See nurse's notes for pain score.  Pain management: adequate  Airway patency: patent  Anesthetic complications: No anesthetic complications  PONV Status: none  Cardiovascular status: acceptable  Respiratory status: acceptable  Hydration status: acceptable    Comments: Patient seen and examined postoperatively; vital signs stable; SpO2 greater than or equal to 90%; cardiopulmonary status stable; nausea/vomiting adequately controlled; pain adequately controlled; no apparent anesthesia complications; patient discharged from anesthesia care when discharge criteria were met

## 2021-08-21 ENCOUNTER — INPATIENT HOSPITAL (OUTPATIENT)
Dept: URBAN - METROPOLITAN AREA HOSPITAL 84 | Facility: HOSPITAL | Age: 76
End: 2021-08-21
Payer: COMMERCIAL

## 2021-08-21 DIAGNOSIS — R13.10 DYSPHAGIA, UNSPECIFIED: ICD-10-CM

## 2021-08-21 DIAGNOSIS — K22.8 OTHER SPECIFIED DISEASES OF ESOPHAGUS: ICD-10-CM

## 2021-08-21 LAB
ANION GAP SERPL CALCULATED.3IONS-SCNC: 7 MMOL/L (ref 5–15)
BASOPHILS # BLD AUTO: 0 10*3/MM3 (ref 0–0.2)
BASOPHILS NFR BLD AUTO: 0.2 % (ref 0–1.5)
BUN SERPL-MCNC: 12 MG/DL (ref 8–23)
BUN/CREAT SERPL: 19 (ref 7–25)
CALCIUM SPEC-SCNC: 7.7 MG/DL (ref 8.6–10.5)
CHLORIDE SERPL-SCNC: 108 MMOL/L (ref 98–107)
CO2 SERPL-SCNC: 26 MMOL/L (ref 22–29)
CREAT SERPL-MCNC: 0.63 MG/DL (ref 0.57–1)
DEPRECATED RDW RBC AUTO: 47.7 FL (ref 37–54)
EOSINOPHIL # BLD AUTO: 0.1 10*3/MM3 (ref 0–0.4)
EOSINOPHIL NFR BLD AUTO: 1.4 % (ref 0.3–6.2)
ERYTHROCYTE [DISTWIDTH] IN BLOOD BY AUTOMATED COUNT: 14.6 % (ref 12.3–15.4)
GFR SERPL CREATININE-BSD FRML MDRD: 92 ML/MIN/1.73
GLUCOSE SERPL-MCNC: 80 MG/DL (ref 65–99)
HCT VFR BLD AUTO: 32.7 % (ref 34–46.6)
HGB BLD-MCNC: 11 G/DL (ref 12–15.9)
LYMPHOCYTES # BLD AUTO: 0.6 10*3/MM3 (ref 0.7–3.1)
LYMPHOCYTES NFR BLD AUTO: 9.3 % (ref 19.6–45.3)
MAXIMAL PREDICTED HEART RATE: 144 BPM
MCH RBC QN AUTO: 31 PG (ref 26.6–33)
MCHC RBC AUTO-ENTMCNC: 33.5 G/DL (ref 31.5–35.7)
MCV RBC AUTO: 92.7 FL (ref 79–97)
MONOCYTES # BLD AUTO: 0.5 10*3/MM3 (ref 0.1–0.9)
MONOCYTES NFR BLD AUTO: 7.2 % (ref 5–12)
NEUTROPHILS NFR BLD AUTO: 5.3 10*3/MM3 (ref 1.7–7)
NEUTROPHILS NFR BLD AUTO: 81.9 % (ref 42.7–76)
NRBC BLD AUTO-RTO: 0 /100 WBC (ref 0–0.2)
PLATELET # BLD AUTO: 67 10*3/MM3 (ref 140–450)
PMV BLD AUTO: 10.6 FL (ref 6–12)
POTASSIUM SERPL-SCNC: 3.5 MMOL/L (ref 3.5–5.2)
RBC # BLD AUTO: 3.53 10*6/MM3 (ref 3.77–5.28)
SODIUM SERPL-SCNC: 141 MMOL/L (ref 136–145)
STRESS TARGET HR: 122 BPM
WBC # BLD AUTO: 6.4 10*3/MM3 (ref 3.4–10.8)

## 2021-08-21 PROCEDURE — 99232 SBSQ HOSP IP/OBS MODERATE 35: CPT | Performed by: NURSE PRACTITIONER

## 2021-08-21 PROCEDURE — 85025 COMPLETE CBC W/AUTO DIFF WBC: CPT | Performed by: NURSE PRACTITIONER

## 2021-08-21 PROCEDURE — 99232 SBSQ HOSP IP/OBS MODERATE 35: CPT | Performed by: INTERNAL MEDICINE

## 2021-08-21 PROCEDURE — 80048 BASIC METABOLIC PNL TOTAL CA: CPT | Performed by: NURSE PRACTITIONER

## 2021-08-21 RX ADMIN — SODIUM CHLORIDE 100 ML/HR: 9 INJECTION, SOLUTION INTRAVENOUS at 04:12

## 2021-08-21 RX ADMIN — ACETAMINOPHEN 650 MG: 325 TABLET, FILM COATED ORAL at 21:57

## 2021-08-21 RX ADMIN — AMLODIPINE BESYLATE 10 MG: 5 TABLET ORAL at 08:44

## 2021-08-21 RX ADMIN — ISOSORBIDE MONONITRATE 30 MG: 30 TABLET, EXTENDED RELEASE ORAL at 08:44

## 2021-08-21 RX ADMIN — ROSUVASTATIN CALCIUM 5 MG: 5 TABLET, FILM COATED ORAL at 08:45

## 2021-08-21 RX ADMIN — ACETAMINOPHEN 650 MG: 325 TABLET, FILM COATED ORAL at 16:20

## 2021-08-21 RX ADMIN — LEVOTHYROXINE SODIUM 137 MCG: 0.11 TABLET ORAL at 08:46

## 2021-08-21 RX ADMIN — PAROXETINE HYDROCHLORIDE 30 MG: 20 TABLET, FILM COATED ORAL at 08:44

## 2021-08-22 VITALS
SYSTOLIC BLOOD PRESSURE: 117 MMHG | OXYGEN SATURATION: 96 % | WEIGHT: 212.96 LBS | TEMPERATURE: 97.5 F | HEART RATE: 65 BPM | HEIGHT: 62 IN | DIASTOLIC BLOOD PRESSURE: 62 MMHG | RESPIRATION RATE: 20 BRPM | BODY MASS INDEX: 39.19 KG/M2

## 2021-08-22 PROCEDURE — 99238 HOSP IP/OBS DSCHRG MGMT 30/<: CPT | Performed by: INTERNAL MEDICINE

## 2021-08-22 RX ORDER — LEVOTHYROXINE SODIUM 137 UG/1
137 TABLET ORAL
COMMUNITY
Start: 2021-08-23 | End: 2021-08-22 | Stop reason: HOSPADM

## 2021-08-22 RX ORDER — LEVOTHYROXINE SODIUM 137 UG/1
137 TABLET ORAL
COMMUNITY
Start: 2021-08-23 | End: 2021-08-26 | Stop reason: SDUPTHER

## 2021-08-22 RX ORDER — LEVOTHYROXINE SODIUM 137 UG/1
137 TABLET ORAL DAILY
COMMUNITY
Start: 2021-08-22

## 2021-08-22 RX ORDER — ISOSORBIDE MONONITRATE 30 MG/1
30 TABLET, EXTENDED RELEASE ORAL DAILY
COMMUNITY
Start: 2021-08-23 | End: 2022-08-02

## 2021-08-22 RX ORDER — PAROXETINE 30 MG/1
30 TABLET, FILM COATED ORAL DAILY
COMMUNITY
Start: 2021-08-23

## 2021-08-22 RX ADMIN — APIXABAN 5 MG: 5 TABLET, FILM COATED ORAL at 08:48

## 2021-08-22 RX ADMIN — ROSUVASTATIN CALCIUM 5 MG: 5 TABLET, FILM COATED ORAL at 08:47

## 2021-08-22 RX ADMIN — ISOSORBIDE MONONITRATE 30 MG: 30 TABLET, EXTENDED RELEASE ORAL at 08:47

## 2021-08-22 RX ADMIN — LEVOTHYROXINE SODIUM 137 MCG: 0.11 TABLET ORAL at 07:22

## 2021-08-22 RX ADMIN — ACETAMINOPHEN 650 MG: 325 TABLET, FILM COATED ORAL at 11:41

## 2021-08-22 RX ADMIN — PAROXETINE HYDROCHLORIDE 30 MG: 20 TABLET, FILM COATED ORAL at 08:47

## 2021-08-22 RX ADMIN — AMLODIPINE BESYLATE 10 MG: 5 TABLET ORAL at 08:48

## 2021-08-23 ENCOUNTER — READMISSION MANAGEMENT (OUTPATIENT)
Dept: CALL CENTER | Facility: HOSPITAL | Age: 76
End: 2021-08-23

## 2021-08-23 ENCOUNTER — APPOINTMENT (OUTPATIENT)
Dept: LAB | Facility: HOSPITAL | Age: 76
End: 2021-08-23

## 2021-08-23 NOTE — OUTREACH NOTE
Prep Survey      Responses   [facility]   Is LACE score < 7 ?  No   Emergency Room discharge w/ pulse ox?  No   Eligibility  Readm Mgmt   Discharge diagnosis  AFIB,  Transseptal puncture of an intact atrial septum and left atrial appendage angiography and watchman device   Does the patient have one of the following disease processes/diagnoses(primary or secondary)?  General Surgery   Does the patient have Home health ordered?  No   Is there a DME ordered?  No   Comments regarding appointments  call for apmts   Prep survey completed?  Yes          Latoya Akhtar RN

## 2021-08-25 ENCOUNTER — TELEPHONE (OUTPATIENT)
Dept: CARDIOLOGY | Facility: CLINIC | Age: 76
End: 2021-08-25

## 2021-08-25 ENCOUNTER — READMISSION MANAGEMENT (OUTPATIENT)
Dept: CALL CENTER | Facility: HOSPITAL | Age: 76
End: 2021-08-25

## 2021-08-25 LAB — QT INTERVAL: 448 MS

## 2021-08-25 NOTE — OUTREACH NOTE
General Surgery Week 1 Survey      Responses   [facility] from?  [facility]   Does the patient have one of the following disease processes/diagnoses(primary or secondary)?  General Surgery   Week 1 attempt successful?  Yes   Call start time  1646   Call end time  1649   Meds reviewed with patient/caregiver?  Yes   Does the patient have all medications related to this admission filled (includes all antibiotics, pain medications, etc.)  N/A   Is the patient taking all medications as directed (includes completed medication regime)?  Yes   Does the patient have a follow up appointment scheduled with their surgeon?  Yes   Has the patient kept scheduled appointments due by today?  N/A   Has home health visited the patient within 72 hours of discharge?  N/A   Psychosocial issues?  No   Did the patient receive a copy of their discharge instructions?  Yes   Nursing interventions  Reviewed instructions with patient   What is the patient's perception of their health status since discharge?  Improving   Nursing interventions  Nurse provided patient education   Is the patient /caregiver able to teach back basic post-op care?  Drive as instructed by MD in discharge instructions, Lifting as instructed by MD in discharge instructions   Is the patient/caregiver able to teach back steps to recovery at home?  Make a list of questions for surgeon's appointment, Rest and rebuild strength, gradually increase activity, Set small, achievable goals for return to baseline health   If the patient is a current smoker, are they able to teach back resources for cessation?  Not a smoker   Week 1 call completed?  Yes          Vivian Grubbs LPN

## 2021-08-26 ENCOUNTER — OFFICE VISIT (OUTPATIENT)
Dept: CARDIOLOGY | Facility: CLINIC | Age: 76
End: 2021-08-26

## 2021-08-26 VITALS
DIASTOLIC BLOOD PRESSURE: 67 MMHG | HEIGHT: 62 IN | HEART RATE: 69 BPM | SYSTOLIC BLOOD PRESSURE: 107 MMHG | WEIGHT: 212 LBS | BODY MASS INDEX: 39.01 KG/M2 | OXYGEN SATURATION: 97 %

## 2021-08-26 DIAGNOSIS — I35.1 NONRHEUMATIC AORTIC VALVE INSUFFICIENCY: ICD-10-CM

## 2021-08-26 DIAGNOSIS — Z98.61 CAD S/P PERCUTANEOUS CORONARY ANGIOPLASTY: ICD-10-CM

## 2021-08-26 DIAGNOSIS — I50.32 CHRONIC DIASTOLIC HEART FAILURE (HCC): ICD-10-CM

## 2021-08-26 DIAGNOSIS — I48.0 PAROXYSMAL ATRIAL FIBRILLATION (HCC): ICD-10-CM

## 2021-08-26 DIAGNOSIS — Z95.818 PRESENCE OF WATCHMAN LEFT ATRIAL APPENDAGE CLOSURE DEVICE: Primary | ICD-10-CM

## 2021-08-26 DIAGNOSIS — I10 ESSENTIAL HYPERTENSION: ICD-10-CM

## 2021-08-26 DIAGNOSIS — D69.6 THROMBOCYTOPENIA (HCC): ICD-10-CM

## 2021-08-26 DIAGNOSIS — I25.10 CAD S/P PERCUTANEOUS CORONARY ANGIOPLASTY: ICD-10-CM

## 2021-08-26 DIAGNOSIS — I34.0 NONRHEUMATIC MITRAL VALVE REGURGITATION: ICD-10-CM

## 2021-08-26 DIAGNOSIS — E78.5 DYSLIPIDEMIA: ICD-10-CM

## 2021-08-26 PROCEDURE — 99214 OFFICE O/P EST MOD 30 MIN: CPT | Performed by: INTERNAL MEDICINE

## 2021-08-31 ENCOUNTER — TELEPHONE (OUTPATIENT)
Dept: CARDIOLOGY | Facility: CLINIC | Age: 76
End: 2021-08-31

## 2021-09-02 ENCOUNTER — READMISSION MANAGEMENT (OUTPATIENT)
Dept: CALL CENTER | Facility: HOSPITAL | Age: 76
End: 2021-09-02

## 2021-09-02 NOTE — OUTREACH NOTE
General Surgery Week 2 Survey      Responses   [facility] discharged from?  [facility]   Does the patient have one of the following disease processes/diagnoses(primary or secondary)?  General Surgery   Week 2 attempt successful?  Yes   Call start time  1515   Call end time  1517   Discharge diagnosis  AFIB,  Transseptal puncture of an intact atrial septum and left atrial appendage angiography and watchman device   Meds reviewed with patient/caregiver?  Yes   Does the patient have all medications related to this admission filled (includes all antibiotics, pain medications, etc.)  N/A   Is the patient taking all medications as directed (includes completed medication regime)?  Yes   Does the patient have a follow up appointment scheduled with their surgeon?  Yes   Has the patient kept scheduled appointments due by today?  Yes   Has home health visited the patient within 72 hours of discharge?  N/A   Psychosocial issues?  No   Did the patient receive a copy of their discharge instructions?  Yes   Nursing interventions  Reviewed instructions with patient   What is the patient's perception of their health status since discharge?  Improving   Nursing interventions  Nurse provided patient education   Is the patient /caregiver able to teach back basic post-op care?  Drive as instructed by MD in discharge instructions, Lifting as instructed by MD in discharge instructions   Is the patient/caregiver able to teach back steps to recovery at home?  Set small, achievable goals for return to baseline health, Rest and rebuild strength, gradually increase activity   If the patient is a current smoker, are they able to teach back resources for cessation?  Not a smoker   Is the patient/caregiver able to teach back the hierarchy of who to call/visit for symptoms/problems? PCP, Specialist, Home health nurse, Urgent Care, ED, 911  Yes   Week 2 call completed?  Yes          Vivian Grubbs LPN

## 2021-09-09 ENCOUNTER — TELEPHONE (OUTPATIENT)
Dept: CARDIOLOGY | Facility: CLINIC | Age: 76
End: 2021-09-09

## 2021-09-09 DIAGNOSIS — R13.10 PAINFUL SWALLOWING: Primary | ICD-10-CM

## 2021-09-09 NOTE — TELEPHONE ENCOUNTER
Watchman procedure 8/26, states since than her esphophagus is bruised and she had been constipated, would this be from the watchman procedure and or her heart

## 2021-09-09 NOTE — TELEPHONE ENCOUNTER
Patient states that when she eats it feels like there is something in her throat. Pain is decreasing. She would like it evaluated.     Pt advised to call PCP for constipation.

## 2021-10-01 ENCOUNTER — ANESTHESIA EVENT (OUTPATIENT)
Dept: CARDIOLOGY | Facility: HOSPITAL | Age: 76
End: 2021-10-01

## 2021-10-01 ENCOUNTER — LAB (OUTPATIENT)
Dept: LAB | Facility: HOSPITAL | Age: 76
End: 2021-10-01

## 2021-10-01 ENCOUNTER — HOSPITAL ENCOUNTER (OUTPATIENT)
Dept: CARDIOLOGY | Facility: HOSPITAL | Age: 76
Discharge: HOME | End: 2021-10-01

## 2021-10-01 DIAGNOSIS — I25.10 CAD S/P PERCUTANEOUS CORONARY ANGIOPLASTY: ICD-10-CM

## 2021-10-01 DIAGNOSIS — Z95.818 PRESENCE OF WATCHMAN LEFT ATRIAL APPENDAGE CLOSURE DEVICE: ICD-10-CM

## 2021-10-01 DIAGNOSIS — I10 ESSENTIAL HYPERTENSION: ICD-10-CM

## 2021-10-01 DIAGNOSIS — D69.6 THROMBOCYTOPENIA (HCC): ICD-10-CM

## 2021-10-01 DIAGNOSIS — E78.5 DYSLIPIDEMIA: ICD-10-CM

## 2021-10-01 DIAGNOSIS — I34.0 NONRHEUMATIC MITRAL VALVE REGURGITATION: ICD-10-CM

## 2021-10-01 DIAGNOSIS — I35.1 NONRHEUMATIC AORTIC VALVE INSUFFICIENCY: ICD-10-CM

## 2021-10-01 DIAGNOSIS — Z98.61 CAD S/P PERCUTANEOUS CORONARY ANGIOPLASTY: ICD-10-CM

## 2021-10-01 DIAGNOSIS — I50.32 CHRONIC DIASTOLIC HEART FAILURE (HCC): ICD-10-CM

## 2021-10-01 DIAGNOSIS — I48.0 PAROXYSMAL ATRIAL FIBRILLATION (HCC): ICD-10-CM

## 2021-10-01 LAB
ANION GAP SERPL CALCULATED.3IONS-SCNC: 8.9 MMOL/L (ref 5–15)
APTT PPP: 29.6 SECONDS (ref 24–31)
BUN SERPL-MCNC: 10 MG/DL (ref 8–23)
BUN/CREAT SERPL: 13.2 (ref 7–25)
CALCIUM SPEC-SCNC: 9.1 MG/DL (ref 8.6–10.5)
CHLORIDE SERPL-SCNC: 104 MMOL/L (ref 98–107)
CO2 SERPL-SCNC: 27.1 MMOL/L (ref 22–29)
CREAT SERPL-MCNC: 0.76 MG/DL (ref 0.57–1)
DEPRECATED RDW RBC AUTO: 44.1 FL (ref 37–54)
ERYTHROCYTE [DISTWIDTH] IN BLOOD BY AUTOMATED COUNT: 12.9 % (ref 12.3–15.4)
GFR SERPL CREATININE-BSD FRML MDRD: 74 ML/MIN/1.73
GLUCOSE SERPL-MCNC: 85 MG/DL (ref 65–99)
HCT VFR BLD AUTO: 36.9 % (ref 34–46.6)
HGB BLD-MCNC: 12.2 G/DL (ref 12–15.9)
INR PPP: 1.1 (ref 0.93–1.1)
MCH RBC QN AUTO: 31.1 PG (ref 26.6–33)
MCHC RBC AUTO-ENTMCNC: 33.1 G/DL (ref 31.5–35.7)
MCV RBC AUTO: 94.1 FL (ref 79–97)
PLATELET # BLD AUTO: 100 10*3/MM3 (ref 140–450)
PMV BLD AUTO: 12.5 FL (ref 6–12)
POTASSIUM SERPL-SCNC: 3.9 MMOL/L (ref 3.5–5.2)
PROTHROMBIN TIME: 12.1 SECONDS (ref 9.6–11.7)
RBC # BLD AUTO: 3.92 10*6/MM3 (ref 3.77–5.28)
SARS-COV-2 ORF1AB RESP QL NAA+PROBE: NOT DETECTED
SODIUM SERPL-SCNC: 140 MMOL/L (ref 136–145)
WBC # BLD AUTO: 3.96 10*3/MM3 (ref 3.4–10.8)

## 2021-10-01 PROCEDURE — C9803 HOPD COVID-19 SPEC COLLECT: HCPCS

## 2021-10-01 PROCEDURE — 85730 THROMBOPLASTIN TIME PARTIAL: CPT

## 2021-10-01 PROCEDURE — 93005 ELECTROCARDIOGRAM TRACING: CPT | Performed by: INTERNAL MEDICINE

## 2021-10-01 PROCEDURE — 85027 COMPLETE CBC AUTOMATED: CPT

## 2021-10-01 PROCEDURE — U0004 COV-19 TEST NON-CDC HGH THRU: HCPCS

## 2021-10-01 PROCEDURE — 80048 BASIC METABOLIC PNL TOTAL CA: CPT

## 2021-10-01 PROCEDURE — 85610 PROTHROMBIN TIME: CPT

## 2021-10-01 PROCEDURE — U0005 INFEC AGEN DETEC AMPLI PROBE: HCPCS

## 2021-10-01 PROCEDURE — 36415 COLL VENOUS BLD VENIPUNCTURE: CPT

## 2021-10-01 PROCEDURE — 93010 ELECTROCARDIOGRAM REPORT: CPT | Performed by: INTERNAL MEDICINE

## 2021-10-01 RX ORDER — SODIUM CHLORIDE 0.9 % (FLUSH) 0.9 %
10 SYRINGE (ML) INJECTION AS NEEDED
Status: CANCELLED | OUTPATIENT
Start: 2021-10-01

## 2021-10-01 RX ORDER — SODIUM CHLORIDE 9 MG/ML
9 INJECTION, SOLUTION INTRAVENOUS CONTINUOUS PRN
Status: CANCELLED | OUTPATIENT
Start: 2021-10-01

## 2021-10-01 RX ORDER — SODIUM CHLORIDE 0.9 % (FLUSH) 0.9 %
10 SYRINGE (ML) INJECTION EVERY 12 HOURS SCHEDULED
Status: CANCELLED | OUTPATIENT
Start: 2021-10-01

## 2021-10-04 ENCOUNTER — ANESTHESIA (OUTPATIENT)
Dept: CARDIOLOGY | Facility: HOSPITAL | Age: 76
End: 2021-10-04

## 2021-10-04 ENCOUNTER — HOSPITAL ENCOUNTER (OUTPATIENT)
Dept: CARDIOLOGY | Facility: HOSPITAL | Age: 76
Discharge: HOME | End: 2021-10-04

## 2021-10-04 VITALS
HEIGHT: 62 IN | HEART RATE: 73 BPM | DIASTOLIC BLOOD PRESSURE: 69 MMHG | RESPIRATION RATE: 19 BRPM | WEIGHT: 209.88 LBS | BODY MASS INDEX: 38.62 KG/M2 | TEMPERATURE: 96.8 F | SYSTOLIC BLOOD PRESSURE: 133 MMHG | OXYGEN SATURATION: 100 %

## 2021-10-04 VITALS — DIASTOLIC BLOOD PRESSURE: 67 MMHG | OXYGEN SATURATION: 100 % | SYSTOLIC BLOOD PRESSURE: 149 MMHG

## 2021-10-04 DIAGNOSIS — D69.6 THROMBOCYTOPENIA (HCC): ICD-10-CM

## 2021-10-04 DIAGNOSIS — I10 ESSENTIAL HYPERTENSION: ICD-10-CM

## 2021-10-04 DIAGNOSIS — I25.10 CAD S/P PERCUTANEOUS CORONARY ANGIOPLASTY: ICD-10-CM

## 2021-10-04 DIAGNOSIS — Z95.818 PRESENCE OF WATCHMAN LEFT ATRIAL APPENDAGE CLOSURE DEVICE: ICD-10-CM

## 2021-10-04 DIAGNOSIS — E78.5 DYSLIPIDEMIA: ICD-10-CM

## 2021-10-04 DIAGNOSIS — I48.0 PAROXYSMAL ATRIAL FIBRILLATION (HCC): ICD-10-CM

## 2021-10-04 DIAGNOSIS — Z98.61 CAD S/P PERCUTANEOUS CORONARY ANGIOPLASTY: ICD-10-CM

## 2021-10-04 DIAGNOSIS — I35.1 NONRHEUMATIC AORTIC VALVE INSUFFICIENCY: ICD-10-CM

## 2021-10-04 DIAGNOSIS — I50.32 CHRONIC DIASTOLIC HEART FAILURE (HCC): ICD-10-CM

## 2021-10-04 DIAGNOSIS — I34.0 NONRHEUMATIC MITRAL VALVE REGURGITATION: ICD-10-CM

## 2021-10-04 PROCEDURE — 93325 DOPPLER ECHO COLOR FLOW MAPG: CPT | Performed by: INTERNAL MEDICINE

## 2021-10-04 PROCEDURE — 25010000002 PROPOFOL 10 MG/ML EMULSION: Performed by: ANESTHESIOLOGY

## 2021-10-04 PROCEDURE — 93312 ECHO TRANSESOPHAGEAL: CPT | Performed by: INTERNAL MEDICINE

## 2021-10-04 PROCEDURE — 93325 DOPPLER ECHO COLOR FLOW MAPG: CPT

## 2021-10-04 PROCEDURE — 93320 DOPPLER ECHO COMPLETE: CPT | Performed by: INTERNAL MEDICINE

## 2021-10-04 PROCEDURE — 93312 ECHO TRANSESOPHAGEAL: CPT

## 2021-10-04 PROCEDURE — 93320 DOPPLER ECHO COMPLETE: CPT

## 2021-10-04 RX ORDER — CLOPIDOGREL BISULFATE 75 MG/1
75 TABLET ORAL DAILY
Qty: 90 TABLET | Refills: 3 | Status: SHIPPED | OUTPATIENT
Start: 2021-10-04 | End: 2021-12-07

## 2021-10-04 RX ORDER — SODIUM CHLORIDE 9 MG/ML
INJECTION, SOLUTION INTRAVENOUS CONTINUOUS PRN
Status: DISCONTINUED | OUTPATIENT
Start: 2021-10-04 | End: 2021-10-04 | Stop reason: SURG

## 2021-10-04 RX ORDER — ASPIRIN 81 MG/1
81 TABLET ORAL DAILY
Qty: 90 TABLET | Refills: 3 | Status: SHIPPED | OUTPATIENT
Start: 2021-10-04 | End: 2022-08-02

## 2021-10-04 RX ORDER — PROPOFOL 10 MG/ML
VIAL (ML) INTRAVENOUS AS NEEDED
Status: DISCONTINUED | OUTPATIENT
Start: 2021-10-04 | End: 2021-10-04 | Stop reason: SURG

## 2021-10-04 RX ORDER — LIDOCAINE HYDROCHLORIDE 10 MG/ML
INJECTION, SOLUTION EPIDURAL; INFILTRATION; INTRACAUDAL; PERINEURAL AS NEEDED
Status: DISCONTINUED | OUTPATIENT
Start: 2021-10-04 | End: 2021-10-04 | Stop reason: SURG

## 2021-10-04 RX ADMIN — SODIUM CHLORIDE: 0.9 INJECTION, SOLUTION INTRAVENOUS at 10:12

## 2021-10-04 RX ADMIN — PROPOFOL 80 MG: 10 INJECTION, EMULSION INTRAVENOUS at 10:14

## 2021-10-04 RX ADMIN — LIDOCAINE HYDROCHLORIDE 50 MG: 10 INJECTION, SOLUTION EPIDURAL; INFILTRATION; INTRACAUDAL; PERINEURAL at 10:14

## 2021-10-04 NOTE — H&P
Patient Care Team:  Gael Vasquez MD as PCP - General  Eric Low MD as Consulting Physician (Cardiology)    Chief complaint here for post watchman TON    Subjective     Patient has A. fib and high [id number] score but is a poor candidate for long-term anticoagulation therefore underwent watchman is here for post watchman TON.    History of Present Illness        This is a 75-year-old with PMH of     #.CAD, JUAN , cath 5/9/18 Severe disease in OM1 branch of LCX,FFR RCA 0.97  Patent stent in ostial right with moderate InStent stenosis and noncritical FFR at 0.90  Elevated  LVEDP with normal LV systolic function. cath  01/21/2019 PTCA to instent  RCA.  Repeat stent to RCA in [place] in 2019  #. CAROLYN-TACHY SYNDROME with  P. A.FIB  , long-term anticoagulation, s/p watchman 8/19/2021  #   dyslipidemia,statin allergy  #  hypertension, positive family history, obesity, hypothyroidism.  #  history of lupus, thrombocytopenia, pernicious anemia.  psoriasis, Raynaud's, Sjogren's  #   cholecystectomy  #.  moderate MR and -moderate AI, last echo 20 60  #  type 2 diabetes. (Patient says she is not diabetic.)        Here for  post wacthman TON.  Patient denies any chest pain or shortness of breath..   Patient  had labs done with PMD Dr. Vasquez. and reportedly sugars are running good denies any hypoglycemia episodes. patient had labs done 08/20/2018 with rheumatology CMP was unremarkable.  Labs done 11/22/2019 revealed normal CMP, CK, cholesterol of 191, HDL 40  triglycerides 148, Labs from 11/15/2020 reveal cholesterol 95 triglycerides 90 HDL 41 LDL 36 CMP and Ck unremarkable.  Labs from 6/3/2021 revealed normal CMP, CBC with a platelet count of 114.  Labs 8/19/2021 revealed normal CBC with platelet count of 77 and Chem-7 with low calcium.         ASSESSMENT:  #Status post watchman  #Thrombocytopenia  #Dyspnea on exertion  #Sinus node dysfunction with junctional bradycardia  #Chest pain,  atypical for angina  #Dyslipidemia  #   paroxysmal atrial fibrillation, long-term anticoagulation  # . chronic CHF due to diastolic dysfunction with normal LV systolic function in the past  #.   CAD ,PCI  #.  hypertension, dyslipidemia  #.   ambrocio-tachy syndrome , s/p ILR  #  PVC  #.  moderate MR and-moderate AI      PLAN:  Reviewed watchman images with patient.  Patient has thrombocytopenia, therefore will hold off on aspirin.  Continue medical management with amlodipine,  Eliquis, isosorbide, rosuvastatin, KCl to help with A. fib, CHF, CAD, PCI, hypertension, dyslipidemia as tolerated.  Patient's [id number] over is 5,due to female gender, age over 74, hypertension, congestive heart failure ,will benefit from long-term anticoagulation.  But patient has thrombocytopenia which makes her a poor candidate for long-term anticoagulation.  Therefore underwent watchman.  We will schedule 45-day post watchman TON..  Risk benefits alternatives of TON explained.  Continue statins for dyslipidemia and counseled on  walking exercise for low HDL  Patient has previous history of bradycardia and sinus node dysfunction we will continue symptoms and monitor rhythm closely.  Patient's beta-blockers were discontinued because of bradycardia, heart rate is improved slightly.  Patient's BMI is over 30, counseled on weight loss diet and exercise.  We will continue to monitor valvular heart disease which is asymptomatic at the current time.        Procedures EKG done 8/20/2021 reviewed/interpreted by me reveals sinus rhythm with rate of 71 bpm with no acute ST-T changes.     Review of Systems   See HPI    Past Medical History:   Diagnosis Date   • Atrial fibrillation (HCC)    • Coronary artery disease    • Dyslipidemia    • Hypertension    • Hypothyroidism    • Lupus (HCC)    • Lupus (HCC)    • Raynaud's disease      Past Surgical History:   Procedure Laterality Date   • ATRIAL APPENDAGE EXCLUSION LEFT WITH TRANSESOPHAGEAL ECHOCARDIOGRAM  "N/A 8/19/2021    Procedure: Atrial Appendage Occlusion;  Surgeon: Eric Low MD;  Location: [facility] CATH INVASIVE LOCATION;  Service: Cardiovascular;  Laterality: N/A;   • ATRIAL APPENDAGE EXCLUSION LEFT WITH TRANSESOPHAGEAL ECHOCARDIOGRAM N/A 8/19/2021    Procedure: Atrial Appendage Occlusion;  Surgeon: Francisco Garza MD;  Location: [facility] CATH INVASIVE LOCATION;  Service: Cardiovascular;  Laterality: N/A;   • CARDIAC CATHETERIZATION     • CATARACT EXTRACTION     • CHOLECYSTECTOMY     • ENDOSCOPY N/A 8/20/2021    Procedure: ESOPHAGOGASTRODUODENOSCOPY;  Surgeon: Joao Cross MD;  Location: [facility] ENDOSCOPY;  Service: Gastroenterology;  Laterality: N/A;  submucosal ecchymosis 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   • HYSTERECTOMY     • REPLACEMENT TOTAL KNEE BILATERAL       History reviewed. No pertinent family history.  Social History     Tobacco Use   • Smoking status: Never Smoker   • Smokeless tobacco: Never Used   Vaping Use   • Vaping Use: Never used   Substance Use Topics   • Alcohol use: Not Currently   • Drug use: Never     E-cigarette/Vaping   • E-cigarette/Vaping Use Never User    • Passive Exposure No    • Counseling Given No      E-cigarette/Vaping Substances   • Nicotine No    • THC No    • CBD No    • Flavoring No      (Not in a hospital admission)    Allergies:  Pravastatin and Vancomycin    Objective      Vital Signs  Temp:  [96.8 °F (36 °C)] 96.8 °F (36 °C)  Heart Rate:  [86] 86  Resp:  [20] 20  BP: (132)/(58) 132/58    Physical Exam  Physical Exam   /58 (BP Location: Left arm, Patient Position: Lying)   Pulse 86   Temp 96.8 °F (36 °C) (Tympanic)   Resp 20   Ht 157.5 cm (62\")   Wt 95.2 kg (209 lb 14.1 oz)   SpO2 100%   BMI 38.39 kg/m²   General:  Appears in no acute distress  Eyes: Sclera is anicteric,  conjunctiva is clear   HEENT:  No JVD. Thyroid not visibly enlarged. No mucosal pallor or cyanosis  Respiratory: Respirations regular and unlabored " at rest.  Bilaterally good breath sounds, with good air entry in all fields. No crackles, rubs or wheezes auscultated  Cardiovascular: S1,S2 Regular rate and rhythm. No murmur, rub or gallop auscultated.  . No pretibial pitting edema  Gastrointestinal: Abdomen nondistended, soft  Musculoskeletal:  No abnormal movements  Extremities: No digital clubbing or cyanosis  Skin: Color pink. Skin warm and dry to touch. No rashes  No xanthoma  Neuro: Alert and awake, no lateralizing deficits appreciated  Results Review:    I reviewed the patient's new clinical results.      Assessment/Plan       * No active hospital problems. *      Assessment & Plan    I discussed the patients findings and my recommendations with patient    Eric Andrew Low MD  10/04/21  09:55 EDT

## 2021-10-04 NOTE — ANESTHESIA PREPROCEDURE EVALUATION
Anesthesia Evaluation     Patient summary reviewed and Nursing notes reviewed   no history of anesthetic complications:  NPO Solid Status: > 8 hours  NPO Liquid Status: > 8 hours           Airway   Dental      Pulmonary    (+) shortness of breath, sleep apnea,   Cardiovascular     ECG reviewed  PT is on anticoagulation therapy  Patient on routine beta blocker    (+) hypertension, valvular problems/murmurs AI, TI and MR, CAD, dysrhythmias Paroxysmal Atrial Fib, angina, CHF Diastolic >=55%, ALLEN, hyperlipidemia,       Neuro/Psych  (+) dizziness/light headedness, psychiatric history Depression,     GI/Hepatic/Renal/Endo    (+) obesity,   diabetes mellitus, thyroid problem hypothyroidism    Musculoskeletal     Abdominal    Substance History      OB/GYN          Other   arthritis, autoimmune disease lupus and Sjogren syndrome, blood dyscrasia thrombocytopenia,     ROS/Med Hx Other: Fibromyalgia, low vit D, gallstones, dermatitis, edema, fatigue, osteopenia, osteoporosis, psoriasis, Raynaud's, bursitis, odynophagia      Echo  · Left ventricular systolic function is normal.  · Left ventricular ejection fraction is 60 to 65%  · Left ventricular wall thickness is consistent with mild concentric hypertrophy.  · Left ventricular diastolic function was normal.  · Saline test results are negative.  · Successful deployment of watchman flex device in left atrial appendage with adequate occlusion.    Echocardiogram Findings    Left Ventricle Left ventricular systolic function is normal.   Normal left ventricular cavity size noted. Left ventricular wall thickness is consistent with mild concentric hypertrophy. All left ventricular wall segments contract normally. Left ventricular diastolic function was normal. Normal left atrial pressure. No evidence of left ventricular thrombus or mass present.  Right Ventricle Normal right ventricular cavity size and systolic function noted.  Left Atrium The left atrial cavity is mild to  moderately dilated. No evidence of left atrial thrombus or mass present. There is no spontaneous echo contrast present. No evidence of cardiac transplantation present. Left atrial appendage was found to be multilobar in nature.  Left atrial appendage morphology best described as windsock. Doppler interrogation shows normal flow within the left atrial appendage. No evidence of a left atrial appendage thrombus was present.  Left atrial appendage measurement prior to watchman device implantation was followin.  At 45 degree: Left atrial appendage diameter was 13 mm and depth was 15 mm  2.  At 135 degree: Left atrial appendage diameter was 18 mm in depth was 18 mm  3.  At 90 degree: Left atrial appendage diameter was 17 mm and depth was 16 mm  4.  At 0 degree: Left atrial appendage diameter was 16mm and depth was 19 mm    After watchman device implantation, following compression measurements were made    1.  At 45 degree: Compression diameter of watchman device was 20 mm  2.  At 135 degree: Compression diameter of watchman device was 19 mm  3.  At 90 degree: Compression diameter of watchman device was 19 mm  4.  At 0 degree: Compression diameter of watchman device was 19 mm    There was no perivalvular leak was noted The interatrial septum does not appear to be redundant. No interatrial septal aneurysm present. No lipomatous hypertrophy of the interatrial septum present. No evidence of a patent foramen ovale. No evidence of an atrial septal defect present.  Saline test results are negative.  Right Atrium Normal right atrial cavity size noted.  Aortic Valve The aortic valve is structurally normal with no stenosis present. Mild aortic valve regurgitation is present.  Mitral Valve The mitral valve is structurally normal with no significant stenosis present. Mild mitral valve regurgitation is present.  Tricuspid Valve The tricuspid valve is structurally normal with no significant regurgitation or significant stenosis  present.  Pulmonic Valve The pulmonic valve is not well visualized. The pulmonic valve is grossly normal in structure. There is trace to mild pulmonic valve regurgitation present. There is no pulmonic valve stenosis present.  Greater Vessels No dilation of the aortic root is present.  Pericardium There is no evidence of pericardial effusion. .    Stress  NUCLEAR IMAGIN.  There was  uniform uptake of Cardiolite both in resting and post stress images, no ischemia seen.  2.  Gated images reveal  normal LV size and contractility, LVEF of 74%.     CONCLUSION:  1.  Lexiscan Cardiolite with normal perfusion, negative for ischemia.  2.  Normal wall motion.  LVEF of 74%.     RECOMMENDATIONS:     Clinical correlation recommended.    PSH  CHOLECYSTECTOMY CATARACT EXTRACTION  CARDIAC CATHETERIZATION HYSTERECTOMY  REPLACEMENT TOTAL KNEE BILATERAL ATRIAL APPENDAGE EXCLUSION LEFT WITH TRANSESOPHAGEAL ECHOCARDIOGRAM  ATRIAL APPENDAGE EXCLUSION LEFT WITH TRANSESOPHAGEAL ECHOCARDIOGRAM ENDOSCOPY                Anesthesia Plan    ASA 4     MAC   (Patient identified; pre-operative vital signs, all relevant labs/studies, complete medical/surgical/anesthetic history, full medication list, full allergy list, and NPO status obtained/reviewed; physical assessment performed; anesthetic options, side effects, potential complications, risks, and benefits discussed; questions answered; written anesthesia consent obtained; patient cleared for procedure; anesthesia machine and equipment checked and functioning)    Anesthetic plan, all risks, benefits, and alternatives have been provided, discussed and informed consent has been obtained with: patient.

## 2021-10-04 NOTE — ANESTHESIA POSTPROCEDURE EVALUATION
Patient: Ally Ivory    Procedure Summary     Date: 10/04/21 Room / Location: [facility]    Anesthesia Start: 1012 Anesthesia Stop: 1020    Procedure: ADULT TRANSESOPHAGEAL ECHO (TON) W/ CONT IF NECESSARY PER PROTOCOL Diagnosis:       Presence of Watchman left atrial appendage closure device      Paroxysmal atrial fibrillation (HCC)      Thrombocytopenia (HCC)      Dyslipidemia      Chronic diastolic heart failure (HCC)      Essential hypertension      CAD S/P percutaneous coronary angioplasty      Nonrheumatic mitral valve regurgitation      Nonrheumatic aortic valve insufficiency      (Arrhythmia)      (AFib)    Scheduled Providers: Eric Low MD Provider: Lit Balbuena MD    Anesthesia Type: MAC ASA Status: 4          Anesthesia Type: MAC    Vitals  Vitals Value Taken Time   /67 10/04/21 1019   Temp     Pulse     Resp     SpO2 100 % 10/04/21 1020           Post Anesthesia Care and Evaluation    Patient location during evaluation: PACU  Patient participation: complete - patient participated  Level of consciousness: awake  Pain scale: See nurse's notes for pain score.  Pain management: adequate  Airway patency: patent  Anesthetic complications: No anesthetic complications  PONV Status: none  Cardiovascular status: acceptable  Respiratory status: acceptable  Hydration status: acceptable    Comments: Patient seen and examined postoperatively; vital signs stable; SpO2 greater than or equal to 90%; cardiopulmonary status stable; nausea/vomiting adequately controlled; pain adequately controlled; no apparent anesthesia complications; patient discharged from anesthesia care when discharge criteria were met

## 2021-10-05 LAB
BH CV ECHO MEAS - RAP SYSTOLE: 10 MMHG
BH CV ECHO MEAS - RVSP: 57.4 MMHG
BH CV ECHO MEAS - TR MAX VEL: 344.2 CM/SEC
MAXIMAL PREDICTED HEART RATE: 144 BPM
STRESS TARGET HR: 122 BPM

## 2021-10-11 ENCOUNTER — TELEPHONE (OUTPATIENT)
Dept: CARDIOLOGY | Facility: CLINIC | Age: 76
End: 2021-10-11

## 2021-10-12 ENCOUNTER — TELEPHONE (OUTPATIENT)
Dept: CARDIOLOGY | Facility: CLINIC | Age: 76
End: 2021-10-12

## 2021-10-15 LAB — QT INTERVAL: 324 MS

## 2021-12-07 ENCOUNTER — OFFICE VISIT (OUTPATIENT)
Dept: CARDIOLOGY | Facility: CLINIC | Age: 76
End: 2021-12-07

## 2021-12-07 VITALS
HEART RATE: 64 BPM | WEIGHT: 200 LBS | DIASTOLIC BLOOD PRESSURE: 83 MMHG | SYSTOLIC BLOOD PRESSURE: 132 MMHG | BODY MASS INDEX: 36.8 KG/M2 | HEIGHT: 62 IN | OXYGEN SATURATION: 98 %

## 2021-12-07 DIAGNOSIS — I50.32 CHRONIC DIASTOLIC HEART FAILURE (HCC): ICD-10-CM

## 2021-12-07 DIAGNOSIS — R06.09 DYSPNEA ON EXERTION: ICD-10-CM

## 2021-12-07 DIAGNOSIS — E66.9 OBESITY (BMI 30-39.9): ICD-10-CM

## 2021-12-07 DIAGNOSIS — I25.10 CAD S/P PERCUTANEOUS CORONARY ANGIOPLASTY: ICD-10-CM

## 2021-12-07 DIAGNOSIS — I34.0 NONRHEUMATIC MITRAL VALVE REGURGITATION: ICD-10-CM

## 2021-12-07 DIAGNOSIS — E78.5 DYSLIPIDEMIA: ICD-10-CM

## 2021-12-07 DIAGNOSIS — I10 ESSENTIAL HYPERTENSION: ICD-10-CM

## 2021-12-07 DIAGNOSIS — I48.0 PAROXYSMAL ATRIAL FIBRILLATION (HCC): ICD-10-CM

## 2021-12-07 DIAGNOSIS — I35.1 NONRHEUMATIC AORTIC VALVE INSUFFICIENCY: ICD-10-CM

## 2021-12-07 DIAGNOSIS — Z95.818 PRESENCE OF WATCHMAN LEFT ATRIAL APPENDAGE CLOSURE DEVICE: Primary | ICD-10-CM

## 2021-12-07 DIAGNOSIS — I27.20 PULMONARY HYPERTENSION (HCC): ICD-10-CM

## 2021-12-07 DIAGNOSIS — Z98.61 CAD S/P PERCUTANEOUS CORONARY ANGIOPLASTY: ICD-10-CM

## 2021-12-07 PROCEDURE — 99214 OFFICE O/P EST MOD 30 MIN: CPT | Performed by: INTERNAL MEDICINE

## 2021-12-07 NOTE — PROGRESS NOTES
Subjective:     Encounter Date:12/07/2021      Patient ID: Ally Ivory is a 75 y.o. female.    Chief Complaint : Follow-up for A. fib, watchman, CAD, PCI, hypertension, dyslipidemia, bradycardia  History of Present Illness            This is a 75-year-old with PMH of    #.CAD, JUAN , cath 5/9/18 Severe disease in OM1 branch of LCX,FFR RCA 0.97  Patent stent in ostial right with moderate InStent stenosis and noncritical FFR at 0.90  Elevated  LVEDP with normal LV systolic function. cath  01/21/2019 PTCA to instent  RCA.  Repeat stent to RCA in [place] in 2019  #. CAROLYN-TACHY SYNDROME with  P. A.FIB  , long-term anticoagulation, s/p watchman 8/19/2021, post watchman 1 year TON 10/4/21 revealed a PA systolic pressure 58 mmHg  #   dyslipidemia,statin allergy  #  hypertension, positive family history, obesity, hypothyroidism.  #  history of lupus, thrombocytopenia, pernicious anemia.  psoriasis, Raynaud's, Sjogren's  #   cholecystectomy  #.  moderate MR and -moderate AI, last echo 20 60  #  type 2 diabetes. (Patient says she is not diabetic.)       Here for   followup.  Patient is complaining of fatigue and dyspnea on exertion.  Denies any chest pain or shortness of breath..  Patient's arterial blood pressure is 122/83, heart rate 61, O2 sat of 98% on room air.  Patient's BMI is over 36.   Patient  had labs done with PMD Dr. Vasquez. and reportedly sugars are running good denies any hypoglycemia episodes. patient had labs done 08/20/2018 with rheumatology CMP was unremarkable.  Labs done 11/22/2019 revealed normal CMP, CK, cholesterol of 191, HDL 40  triglycerides 148, Labs from 11/15/2020 reveal cholesterol 95 triglycerides 90 HDL 41 LDL 36 CMP and Ck unremarkable.  Labs from 6/3/2021 revealed normal CMP, CBC with a platelet count of 114.  Labs 8/19/2021 revealed normal CBC with platelet count of 77 and Chem-7 with low calcium. Labs from 10/1/2021 revealed normal Chem-7 and CBC        ASSESSMENT:  -Fatigue, dyspnea on exertion  -Obesity BMI over 36  -Pulmonary hypertension  #Atrial fibrillation, status post watchman  #Thrombocytopenia  #Sinus node dysfunction with junctional bradycardia  #Dyslipidemia  # . chronic CHF due to diastolic dysfunction with normal LV systolic function in the past  #.   CAD ,PCI  #.  hypertension, dyslipidemia  #.   ambrocio-tachy syndrome , s/p ILR  #  PVC  #.  moderate MR and-moderate AI     PLAN:  Patient is complaining of fatigue and dyspnea on exertion had a transesophageal echo 10/4/2021 for 1 year follow-up of watchman which revealed severe pulmonary hypertension.  Patient has thrombocytopenia, therefore will hold off on aspirin.  Continue medical management with amlodipine,  Eliquis, isosorbide, rosuvastatin, KCl to help with A. fib, CHF, CAD, PCI, hypertension, dyslipidemia as tolerated.  Patient's [id number] over is 5,due to female gender, age over 74, hypertension, congestive heart failure ,will benefit from long-term anticoagulation.  But patient has thrombocytopenia which makes her a poor candidate for long-term anticoagulation.  Therefore underwent watchman.  Continue statins for dyslipidemia and counseled on  walking exercise for low HDL  Patient has previous history of bradycardia and sinus node dysfunction we will continue symptoms and monitor rhythm closely.  Patient's beta-blockers were discontinued because of bradycardia, heart rate is improved slightly.  Patient's BMI is over 30, counseled on weight loss diet and exercise.  Patient has pulmonary hypertension and obesity will send to pulmonary to evaluate etiologies for pulmonary hypertension.      Procedures EKG done 8/20/2021 reviewed/interpreted by me reveals sinus rhythm with rate of 71 bpm with no acute ST-T changes.        Procedures EKG done 8/20/2021 reviewed/interpreted by me reveals sinus rhythm with rate of 71 bpm    The following portions of the patient's history were reviewed and updated  as appropriate: allergies, current medications, past family history, past medical history, past social history, past surgical history and problem list.    Assessment:         [facility]     Diagnosis Plan   1. Presence of Watchman left atrial appendage closure device  Comprehensive Metabolic Panel    Lipid Panel   2. Paroxysmal atrial fibrillation (HCC)  Comprehensive Metabolic Panel    Lipid Panel    Ambulatory Referral to Pulmonology   3. Dyslipidemia  Comprehensive Metabolic Panel    Lipid Panel   4. Essential hypertension  Comprehensive Metabolic Panel    Lipid Panel   5. CAD S/P percutaneous coronary angioplasty  Comprehensive Metabolic Panel    Lipid Panel   6. Nonrheumatic mitral valve regurgitation  Comprehensive Metabolic Panel    Lipid Panel   7. Nonrheumatic aortic valve insufficiency  Comprehensive Metabolic Panel    Lipid Panel   8. Chronic diastolic heart failure (HCC)  Comprehensive Metabolic Panel    Lipid Panel   9. Dyspnea on exertion  Ambulatory Referral to Pulmonology   10. Pulmonary hypertension (HCC)  Ambulatory Referral to Pulmonology   11. Obesity (BMI 30-39.9)            Plan:               Past Medical History:  Past Medical History:   Diagnosis Date   • Atrial fibrillation (HCC)    • Coronary artery disease    • Dyslipidemia    • Hypertension    • Hypothyroidism    • Lupus (HCC)    • Lupus (HCC)    • Raynaud's disease      Past Surgical History:  Past Surgical History:   Procedure Laterality Date   • ATRIAL APPENDAGE EXCLUSION LEFT WITH TRANSESOPHAGEAL ECHOCARDIOGRAM N/A 8/19/2021    Procedure: Atrial Appendage Occlusion;  Surgeon: Eric Low MD;  Location: [facility] CATH INVASIVE LOCATION;  Service: Cardiovascular;  Laterality: N/A;   • ATRIAL APPENDAGE EXCLUSION LEFT WITH TRANSESOPHAGEAL ECHOCARDIOGRAM N/A 8/19/2021    Procedure: Atrial Appendage Occlusion;  Surgeon: Francisco Garza MD;  Location:  [facility] CATH INVASIVE LOCATION;  Service: Cardiovascular;  Laterality: N/A;   •  CARDIAC CATHETERIZATION     • CATARACT EXTRACTION     • CHOLECYSTECTOMY     • ENDOSCOPY N/A 8/20/2021    Procedure: ESOPHAGOGASTRODUODENOSCOPY;  Surgeon: Joao Cross MD;  Location: [facility] ENDOSCOPY;  Service: Gastroenterology;  Laterality: N/A;  submucosal ecchymosis 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   • HYSTERECTOMY     • REPLACEMENT TOTAL KNEE BILATERAL        Allergies:  Allergies   Allergen Reactions   • Pravastatin Rash     rash     • Vancomycin Hives     Home Meds:  Current Meds:     Current Outpatient Medications:   •  amLODIPine (NORVASC) 10 MG tablet, Take 1 tablet by mouth Daily., Disp: 90 tablet, Rfl: 3  •  aspirin (aspirin) 81 MG EC tablet, Take 1 tablet by mouth Daily., Disp: 90 tablet, Rfl: 3  •  cyanocobalamin 1000 MCG/ML injection, Inject 1,000 mcg into the appropriate muscle as directed by prescriber Every 30 (Thirty) Days., Disp: , Rfl: 4  •  folic acid (FOLVITE) 800 MCG tablet, Take 800 mcg by mouth Daily. 800mcg daily, Disp: , Rfl:   •  hydroxychloroquine (PLAQUENIL) 200 MG tablet, Take 200 mg by mouth Daily., Disp: , Rfl:   •  isosorbide mononitrate (IMDUR) 30 MG 24 hr tablet, Take 1 tablet by mouth Daily., Disp: , Rfl:   •  levothyroxine (Synthroid) 137 MCG tablet, Take 1 tablet by mouth Daily., Disp: , Rfl:   •  PARoxetine (PAXIL) 30 MG tablet, Take 1 tablet by mouth Daily., Disp: , Rfl:   •  rosuvastatin (CRESTOR) 5 MG tablet, Take 5 mg by mouth Daily., Disp: , Rfl:   Social History:   Social History     Tobacco Use   • Smoking status: Never Smoker   • Smokeless tobacco: Never Used   Substance Use Topics   • Alcohol use: Not Currently      Family History:  History reviewed. No pertinent family history.           Review of Systems   Constitutional: Negative for malaise/fatigue.   Cardiovascular: Positive for leg swelling. Negative for chest pain and palpitations.   Respiratory: Positive for shortness of breath.    Skin: Negative for rash.   Neurological: Positive for  "dizziness. Negative for light-headedness and numbness.     All other systems are negative         Objective:     Physical Exam  /83   Pulse 64   Ht 157.5 cm (62\")   Wt 90.7 kg (200 lb)   SpO2 98%   BMI 36.58 kg/m²   General:  Appears in no acute distress, pleasant obese  Eyes: Sclera is anicteric,  conjunctiva is clear   HEENT:  No JVD.  No carotid bruits  Respiratory: Respirations regular and unlabored at rest.  Clear to auscultation  Cardiovascular: S1,S2 Regular rate and rhythm. No murmur, rub or gallop auscultated.   Extremities: No digital clubbing or cyanosis, no edema  Skin: Color pink. Skin warm and dry to touch. No rashes  No xanthoma  Neuro: Alert and awake, no lateralizing deficits appreciated    Lab Reviewed:         Eric Low MD  12/7/2021 11:36 EST      Much of the above report is an electronic transcription/translation of the spoken language to printed text using Dragon Software. As such, the subtleties and finesse of the spoken language may permit erroneous, or at times, nonsensical words or phrases to be inadvertently transcribed; thus changes may be made at a later date to rectify these errors.         "

## 2022-02-23 ENCOUNTER — TELEPHONE (OUTPATIENT)
Dept: CARDIOLOGY | Facility: CLINIC | Age: 77
End: 2022-02-23

## 2022-03-15 ENCOUNTER — LAB (OUTPATIENT)
Dept: LAB | Facility: HOSPITAL | Age: 77
End: 2022-03-15

## 2022-03-15 ENCOUNTER — OFFICE VISIT (OUTPATIENT)
Dept: CARDIOLOGY | Facility: CLINIC | Age: 77
End: 2022-03-15

## 2022-03-15 ENCOUNTER — OFFICE VISIT (OUTPATIENT)
Dept: PULMONOLOGY | Facility: HOSPITAL | Age: 77
End: 2022-03-15

## 2022-03-15 ENCOUNTER — APPOINTMENT (OUTPATIENT)
Dept: OTHER | Facility: HOSPITAL | Age: 77
End: 2022-03-15

## 2022-03-15 VITALS
WEIGHT: 214 LBS | HEIGHT: 62 IN | SYSTOLIC BLOOD PRESSURE: 126 MMHG | HEART RATE: 66 BPM | RESPIRATION RATE: 14 BRPM | OXYGEN SATURATION: 97 % | BODY MASS INDEX: 39.38 KG/M2 | DIASTOLIC BLOOD PRESSURE: 77 MMHG

## 2022-03-15 VITALS
DIASTOLIC BLOOD PRESSURE: 79 MMHG | OXYGEN SATURATION: 99 % | BODY MASS INDEX: 39.38 KG/M2 | HEART RATE: 66 BPM | SYSTOLIC BLOOD PRESSURE: 127 MMHG | WEIGHT: 214 LBS | HEIGHT: 62 IN

## 2022-03-15 DIAGNOSIS — M35.9 CONNECTIVE TISSUE DISEASE: ICD-10-CM

## 2022-03-15 DIAGNOSIS — J84.9 INTERSTITIAL LUNG DISEASE: ICD-10-CM

## 2022-03-15 DIAGNOSIS — R93.89 ABNORMAL CT OF THE CHEST: Primary | ICD-10-CM

## 2022-03-15 DIAGNOSIS — E66.9 OBESITY (BMI 30-39.9): ICD-10-CM

## 2022-03-15 DIAGNOSIS — I27.20 PULMONARY HYPERTENSION: ICD-10-CM

## 2022-03-15 DIAGNOSIS — R06.09 DYSPNEA ON EXERTION: ICD-10-CM

## 2022-03-15 DIAGNOSIS — I34.0 NONRHEUMATIC MITRAL VALVE REGURGITATION: ICD-10-CM

## 2022-03-15 DIAGNOSIS — E78.5 DYSLIPIDEMIA: ICD-10-CM

## 2022-03-15 DIAGNOSIS — Z09 FOLLOW UP: ICD-10-CM

## 2022-03-15 DIAGNOSIS — I48.0 PAROXYSMAL ATRIAL FIBRILLATION: ICD-10-CM

## 2022-03-15 DIAGNOSIS — J84.9 INTERSTITIAL LUNG DISEASE: Primary | ICD-10-CM

## 2022-03-15 DIAGNOSIS — Z98.61 CAD S/P PERCUTANEOUS CORONARY ANGIOPLASTY: ICD-10-CM

## 2022-03-15 DIAGNOSIS — Z95.818 PRESENCE OF WATCHMAN LEFT ATRIAL APPENDAGE CLOSURE DEVICE: ICD-10-CM

## 2022-03-15 DIAGNOSIS — I25.10 CAD S/P PERCUTANEOUS CORONARY ANGIOPLASTY: ICD-10-CM

## 2022-03-15 DIAGNOSIS — I10 ESSENTIAL HYPERTENSION: ICD-10-CM

## 2022-03-15 LAB
ALBUMIN SERPL-MCNC: 4.6 G/DL (ref 3.5–5.2)
ALBUMIN/GLOB SERPL: 1.5 G/DL
ALP SERPL-CCNC: 78 U/L (ref 39–117)
ALT SERPL W P-5'-P-CCNC: 18 U/L (ref 1–33)
ANION GAP SERPL CALCULATED.3IONS-SCNC: 12.1 MMOL/L (ref 5–15)
AST SERPL-CCNC: 20 U/L (ref 1–32)
BASOPHILS # BLD AUTO: 0.02 10*3/MM3 (ref 0–0.2)
BASOPHILS NFR BLD AUTO: 0.4 % (ref 0–1.5)
BILIRUB SERPL-MCNC: 0.7 MG/DL (ref 0–1.2)
BUN SERPL-MCNC: 15 MG/DL (ref 8–23)
BUN/CREAT SERPL: 18.3 (ref 7–25)
C3 SERPL-MCNC: 113 MG/DL (ref 82–167)
C4 SERPL-MCNC: 17 MG/DL (ref 14–44)
CALCIUM SPEC-SCNC: 9.7 MG/DL (ref 8.6–10.5)
CHLORIDE SERPL-SCNC: 104 MMOL/L (ref 98–107)
CO2 SERPL-SCNC: 27.9 MMOL/L (ref 22–29)
CREAT SERPL-MCNC: 0.82 MG/DL (ref 0.57–1)
CRP SERPL-MCNC: <0.3 MG/DL (ref 0–0.5)
DEPRECATED RDW RBC AUTO: 44.4 FL (ref 37–54)
EGFRCR SERPLBLD CKD-EPI 2021: 73.8 ML/MIN/1.73
EOSINOPHIL # BLD AUTO: 0.2 10*3/MM3 (ref 0–0.4)
EOSINOPHIL NFR BLD AUTO: 3.7 % (ref 0.3–6.2)
ERYTHROCYTE [DISTWIDTH] IN BLOOD BY AUTOMATED COUNT: 13 % (ref 12.3–15.4)
ERYTHROCYTE [SEDIMENTATION RATE] IN BLOOD: 2 MM/HR (ref 0–30)
GLOBULIN UR ELPH-MCNC: 3 GM/DL
GLUCOSE SERPL-MCNC: 91 MG/DL (ref 65–99)
HCT VFR BLD AUTO: 40.1 % (ref 34–46.6)
HGB BLD-MCNC: 13.1 G/DL (ref 12–15.9)
IMM GRANULOCYTES # BLD AUTO: 0.01 10*3/MM3 (ref 0–0.05)
IMM GRANULOCYTES NFR BLD AUTO: 0.2 % (ref 0–0.5)
LYMPHOCYTES # BLD AUTO: 1.17 10*3/MM3 (ref 0.7–3.1)
LYMPHOCYTES NFR BLD AUTO: 21.7 % (ref 19.6–45.3)
MCH RBC QN AUTO: 30.4 PG (ref 26.6–33)
MCHC RBC AUTO-ENTMCNC: 32.7 G/DL (ref 31.5–35.7)
MCV RBC AUTO: 93 FL (ref 79–97)
MONOCYTES # BLD AUTO: 0.48 10*3/MM3 (ref 0.1–0.9)
MONOCYTES NFR BLD AUTO: 8.9 % (ref 5–12)
NEUTROPHILS NFR BLD AUTO: 3.51 10*3/MM3 (ref 1.7–7)
NEUTROPHILS NFR BLD AUTO: 65.1 % (ref 42.7–76)
NRBC BLD AUTO-RTO: 0 /100 WBC (ref 0–0.2)
PLATELET # BLD AUTO: 104 10*3/MM3 (ref 140–450)
PMV BLD AUTO: 12.7 FL (ref 6–12)
POTASSIUM SERPL-SCNC: 4.5 MMOL/L (ref 3.5–5.2)
PROT SERPL-MCNC: 7.6 G/DL (ref 6–8.5)
RBC # BLD AUTO: 4.31 10*6/MM3 (ref 3.77–5.28)
SODIUM SERPL-SCNC: 144 MMOL/L (ref 136–145)
WBC NRBC COR # BLD: 5.39 10*3/MM3 (ref 3.4–10.8)

## 2022-03-15 PROCEDURE — 86160 COMPLEMENT ANTIGEN: CPT

## 2022-03-15 PROCEDURE — 86235 NUCLEAR ANTIGEN ANTIBODY: CPT

## 2022-03-15 PROCEDURE — 86038 ANTINUCLEAR ANTIBODIES: CPT

## 2022-03-15 PROCEDURE — 36415 COLL VENOUS BLD VENIPUNCTURE: CPT

## 2022-03-15 PROCEDURE — 85652 RBC SED RATE AUTOMATED: CPT

## 2022-03-15 PROCEDURE — 86140 C-REACTIVE PROTEIN: CPT

## 2022-03-15 PROCEDURE — 80053 COMPREHEN METABOLIC PANEL: CPT

## 2022-03-15 PROCEDURE — G0463 HOSPITAL OUTPT CLINIC VISIT: HCPCS

## 2022-03-15 PROCEDURE — 99214 OFFICE O/P EST MOD 30 MIN: CPT | Performed by: INTERNAL MEDICINE

## 2022-03-15 PROCEDURE — 85025 COMPLETE CBC W/AUTO DIFF WBC: CPT

## 2022-03-15 PROCEDURE — 86200 CCP ANTIBODY: CPT

## 2022-03-15 RX ORDER — ALBUTEROL SULFATE 90 UG/1
AEROSOL, METERED RESPIRATORY (INHALATION)
COMMUNITY

## 2022-03-15 RX ORDER — DILTIAZEM HYDROCHLORIDE 120 MG/1
120 CAPSULE, COATED, EXTENDED RELEASE ORAL 2 TIMES DAILY
COMMUNITY
End: 2022-08-02

## 2022-03-15 NOTE — PROGRESS NOTES
Subjective   Ally Ivory is a 76 y.o. female.     History of Present Illness   New referral for shortness of breath and abnormal CT scan  Shortness of breath started years ago but progressively getting worse, also having dry cough.  No fever or weight loss    Never smoked but exposed to secondhand smoking.  No indoor pets, no exposure to birds.  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    Father had chronic lung disease but probably from smoking pipe, no known history of interstitial lung disease    Patient Active Problem List   Diagnosis   • Abnormal cardiovascular stress test   • Angina pectoris (HCC)   • Chest pain   • Fibromyalgia   • Cholelithiasis   • Chronic diastolic heart failure (HCC)   • Coronary arteriosclerosis in native artery   • Chronic coronary artery disease   • Depressive disorder   • Dermatitis   • Diabetes mellitus (HCC)   • Difficult or painful urination   • Dizziness   • Dyslipidemia   • Dyspnea on exertion   • Ecchymosis   • Edema   • Fatigue   • Gallbladder stone with nonacute cholecystitis   • Hyperlipidemia   • Hypertension   • Hypothyroidism   • Long term current use of anticoagulant therapy   • Inflammatory arthritis   • Lupus (HCC)   • Sjogren's syndrome (HCC)   • Systemic lupus erythematosus (HCC)   • Aortic insufficiency   • Mitral regurgitation   • Tricuspid insufficiency   • Obesity   • Osteopenia   • Osteoporosis   • Overweight   • Paroxysmal atrial fibrillation (HCC)   • Premature atrial contraction   • Presence of Watchman left atrial appendage closure device   • Psoriasis   • Raynaud's phenomenon   • Sleep apnea   • Status post percutaneous transluminal coronary angioplasty   • Thrombocytopenia (HCC)   • Trochanteric bursitis of left hip   • Vitamin D deficiency   • Odynophagia     Current Outpatient Medications on File Prior to Visit   Medication Sig Dispense Refill   • albuterol  sulfate  (90 Base) MCG/ACT inhaler albuterol sulfate HFA 90 mcg/actuation aerosol inhaler   2 PUFFS EVERY 6 HOURS AS NEEDED FOR SHORTNESS OF BREATH/WHEEZING     • amLODIPine (NORVASC) 10 MG tablet Take 1 tablet by mouth Daily. 90 tablet 3   • aspirin (aspirin) 81 MG EC tablet Take 1 tablet by mouth Daily. 90 tablet 3   • cyanocobalamin 1000 MCG/ML injection Inject 1,000 mcg into the appropriate muscle as directed by prescriber Every 30 (Thirty) Days.  4   • dilTIAZem CD (CARDIZEM CD) 120 MG 24 hr capsule Take 120 mg by mouth 2 (Two) Times a Day.     • folic acid (FOLVITE) 800 MCG tablet Take 800 mcg by mouth Daily. 800mcg daily     • hydroxychloroquine (PLAQUENIL) 200 MG tablet Take 200 mg by mouth Daily.     • isosorbide mononitrate (IMDUR) 30 MG 24 hr tablet Take 1 tablet by mouth Daily.     • levothyroxine (SYNTHROID, LEVOTHROID) 137 MCG tablet Take 1 tablet by mouth Daily.     • PARoxetine (PAXIL) 30 MG tablet Take 1 tablet by mouth Daily.     • rosuvastatin (CRESTOR) 5 MG tablet Take 5 mg by mouth Daily.       No current facility-administered medications on file prior to visit.         The following portions of the patient's history were reviewed and updated as appropriate: allergies, current medications, past family history, past medical history, past social history, past surgical history and problem list.  Results for orders placed during the hospital encounter of 10/04/21    Adult Transesophageal Echo (TON) W/ Cont if Necessary Per Protocol    Interpretation Summary  TON   procedure note    Procedure:  TON    Indication:   georgette Adams    Date of procedure  : 10/4/2021    :  Dr. Eric Low    Description of procedure:    Under conscious sedation given by anesthesiology and local anesthesia, a TON probe was advanced into the esophagus and into the stomach 2D, M-mode, color Doppler images were obtained..  Patient tolerated procedure well complications well.    Complications:  "none    Results:    Normal LV size and contractility LV contractility, EF of 60%  Normal RV size  Biatrial enlargement seen.  Left atrial appendage has watchman left atrial appendage occluder device seated well.  Aortic valve, mitral valve, tricuspid valve appears structurally normal, moderate aortic, mitral, tricuspid seen.  Elevated RV systolic pressures at 58 mmHg consistent with pulmonary hypertension seen  No vegetation seen  No pericardial effusion seen.  Proximal aorta appears normal in size.  Mild aortic plaque seen      Recommendations/plan:    Clinical correlation recommended      Review of Systems  Constitutional: Negative for chills, fever and malaise/fatigue.   HENT: Negative.    Eyes: Negative.    Cardiovascular: Negative.    Respiratory: Positive for cough and shortness of breath.    Skin: Negative.    Musculoskeletal: Multiple joints with stiffness and swelling in the feet and hands  Gastrointestinal: Negative.    Genitourinary: Negative.    Neurological: Negative.    Psychiatric/Behavioral: Negative.  Objective   Physical Exam  Blood pressure 126/77, pulse 66, resp. rate 14, height 157.5 cm (62\"), weight 97.1 kg (214 lb), SpO2 97 %.    General Appearance:  Alert   HEENT:  Normocephalic, without obvious abnormality, Conjunctiva/corneas clear,.  Normal external ear canals, Nares normal, no drainage     Neck:  Supple, symmetrical, trachea midline. No JVD.  Lungs /Chest wall:   Bilateral basal rhonchi, respirations unlabored, symmetrical wall movement.     Heart:  Regular rate and rhythm, S1 S2 normal  Abdomen: Soft, non-tender, no masses, no organomegaly.    Extremities: No edema, no clubbing or cyanosis, stiffness and some of the joints in the hand but there is no actual swelling    Assessment/Plan   Interstitial lung disease: Most likely related to connective tissue disease, it has progressed on CT scan from 2013 to August 2021 until the last CT scan January 2022  History of lupus and rheumatoid " arthritis: Currently on hydroxychloroquine but has not seen her rheumatologist for a long time  PFTs done December 2021 shows FVC 55% improving to 69% after bronchodilators, FEV1 54% and improving to 76% after bronchodilators, TLC 77%, DLCO 47%  Added Trelegy 1 inhalation once a day: Sample coupon and prescription given and patient was instructed how to use it    Discussed with the patient the need to increase her immunosuppression, at this point she will make an appointment with her rheumatologist to review the lab results and decide about immunosuppression.    Check CBC CMP and connective tissue disease panel    Follow-up 3 months

## 2022-03-15 NOTE — PROGRESS NOTES
Subjective:     Encounter Date:03/15/2022      Patient ID: Ally Ivory is a 76 y.o. female.    Chief Complaint : Follow-up for A. fib, watchman, CAD, PCI, hypertension, dyslipidemia, bradycardia  History of Present Illness      Ms. Ally Ivory has PMH of    #.CAD, JUAN , cath 5/9/18 Severe disease in OM1 branch of LCX,FFR RCA 0.97  Patent stent in ostial right with moderate InStent stenosis and noncritical FFR at 0.90  Elevated  LVEDP with normal LV systolic function. cath  01/21/2019 PTCA to instent  RCA.  Repeat stent to RCA in [place] in 2019  #. CAROLYN-TACHY SYNDROME with  P. A.FIB  , long-term anticoagulation, s/p watchman 8/19/2021, post watchman 1 year TON 10/4/21 revealed a PA systolic pressure 58 mmHg  #   dyslipidemia,statin allergy  #  hypertension, positive family history, obesity, hypothyroidism.  #  history of lupus, thrombocytopenia, pernicious anemia.  psoriasis, Raynaud's, Sjogren's  #   cholecystectomy  #.  moderate MR and -moderate AI, last echo 20 60  #  type 2 diabetes. (Patient says she is not diabetic.)       Here for   followup.  Patient is complaining of fatigue and dyspnea on exertion.  Denies any chest pain or shortness of breath.. Patient is under a lot of stress because she has abnormal CT scans chest done 12/19/2019  Patient's arterial blood pressure is 127/79, heart rate 66 bpm, O2 sat of 99% on room air.  Patient's BMI is over 30.   Patient  had labs done with PMD Dr. Vasquez. and reportedly sugars are running good denies any hypoglycemia episodes. patient had labs done 08/20/2018 with rheumatology CMP was unremarkable.  Labs done 11/22/2019 revealed normal CMP, CK, cholesterol of 191, HDL 40  triglycerides 148, Labs from 11/15/2020 reveal cholesterol 95 triglycerides 90 HDL 41 LDL 36 CMP and Ck unremarkable.  Labs from 6/3/2021 revealed normal CMP, CBC with a platelet count of 114.  Labs 8/19/2021 revealed normal CBC with platelet count of 77 and Chem-7 with low  calcium. Labs from 10/1/2021 revealed normal Chem-7 and CBC  CT chest with contrast done 1/26/2022 revealed symmetric groundglass opacities seen throughout all lung fields likely represent pulmonary edema or other differential include infectious and inflammatory etiologies clinical correlation recommended.  Patient had splenomegaly       ASSESSMENT:  -Abnormal CT chest  -Fatigue, dyspnea on exertion  -Obesity BMI over 36  -Pulmonary hypertension  #Atrial fibrillation, status post watchman  #Thrombocytopenia  #Sinus node dysfunction with junctional bradycardia  #Dyslipidemia  # . chronic CHF due to diastolic dysfunction with normal LV systolic function in the past  #.   CAD ,PCI  #.  hypertension, dyslipidemia  #.   ambrocio-tachy syndrome , s/p ILR  #  PVC  #.  moderate MR and-moderate AI     PLAN:  Patient is under a lot of stress not only from her  illness now with her CT chest showing abnormality and could not get into pulmonary clinic.  Called [place] [facility] and got her an appointment to see them today at 2 PM.  Patient is complaining of fatigue and dyspnea on exertion had a transesophageal echo 10/4/2021 for 1 year follow-up of watchman which revealed severe pulmonary hypertension.  Patient has thrombocytopenia, is off Eliquis, currently on baby aspirin.  Continue medical management with amlodipine, aspirin, isosorbide, rosuvastatin, KCl to help with A. fib, CHF, CAD, PCI, hypertension, dyslipidemia as tolerated.  Patient's [id number] over is 5,due to female gender, age over 74, hypertension, congestive heart failure ,will benefit from long-term anticoagulation.  But patient has thrombocytopenia which makes her a poor candidate for long-term anticoagulation.  Therefore underwent watchman.  Continue statins for dyslipidemia and counseled on  walking exercise for low HDL  Patient has previous history of bradycardia and sinus node dysfunction we will continue symptoms and monitor rhythm closely.   Patient's beta-blockers were discontinued because of bradycardia, heart rate is improved slightly.  Patient's BMI is over 30, counseled on weight loss diet and exercise.  Patient has pulmonary hypertension and abnormal CT chest and obesity will send to pulmonary to evaluate etiologies for pulmonary hypertension.      Procedures EKG done 8/20/2021 reviewed/interpreted by me reveals sinus rhythm with rate of 71 bpm    The following portions of the patient's history were reviewed and updated as appropriate: allergies, current medications, past family history, past medical history, past social history, past surgical history and problem list.    Assessment:         [facility]     Diagnosis Plan   1. Abnormal CT of the chest  Ambulatory Referral to Pulmonology   2. Presence of Watchman left atrial appendage closure device     3. Paroxysmal atrial fibrillation (HCC)     4. Dyslipidemia     5. Essential hypertension     6. CAD S/P percutaneous coronary angioplasty     7. Nonrheumatic mitral valve regurgitation     8. Obesity (BMI 30-39.9)     9. Pulmonary hypertension (HCC)            Plan:               Past Medical History:  Past Medical History:   Diagnosis Date   • Atrial fibrillation (HCC)    • Coronary artery disease    • Dyslipidemia    • Hypertension    • Hypothyroidism    • Lupus (HCC)    • Lupus (HCC)    • Raynaud's disease      Past Surgical History:  Past Surgical History:   Procedure Laterality Date   • ATRIAL APPENDAGE EXCLUSION LEFT WITH TRANSESOPHAGEAL ECHOCARDIOGRAM N/A 8/19/2021    Procedure: Atrial Appendage Occlusion;  Surgeon: Eric Low MD;  Location: [facility] CATH INVASIVE LOCATION;  Service: Cardiovascular;  Laterality: N/A;   • ATRIAL APPENDAGE EXCLUSION LEFT WITH TRANSESOPHAGEAL ECHOCARDIOGRAM N/A 8/19/2021    Procedure: Atrial Appendage Occlusion;  Surgeon: Francisco Garza MD;  Location: [facility] CATH INVASIVE LOCATION;  Service: Cardiovascular;  Laterality: N/A;   • CARDIAC  CATHETERIZATION     • CATARACT EXTRACTION     • CHOLECYSTECTOMY     • ENDOSCOPY N/A 8/20/2021    Procedure: ESOPHAGOGASTRODUODENOSCOPY;  Surgeon: Joao Cross MD;  Location: [facility] ENDOSCOPY;  Service: Gastroenterology;  Laterality: N/A;  submucosal ecchymosis 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   • HYSTERECTOMY     • REPLACEMENT TOTAL KNEE BILATERAL        Allergies:  Allergies   Allergen Reactions   • Pravastatin Rash     rash     • Vancomycin Hives     Home Meds:  Current Meds:     Current Outpatient Medications:   •  amLODIPine (NORVASC) 10 MG tablet, Take 1 tablet by mouth Daily., Disp: 90 tablet, Rfl: 3  •  aspirin (aspirin) 81 MG EC tablet, Take 1 tablet by mouth Daily., Disp: 90 tablet, Rfl: 3  •  dilTIAZem CD (CARDIZEM CD) 120 MG 24 hr capsule, Take 120 mg by mouth 2 (Two) Times a Day., Disp: , Rfl:   •  hydroxychloroquine (PLAQUENIL) 200 MG tablet, Take 200 mg by mouth Daily., Disp: , Rfl:   •  isosorbide mononitrate (IMDUR) 30 MG 24 hr tablet, Take 1 tablet by mouth Daily., Disp: , Rfl:   •  levothyroxine (SYNTHROID, LEVOTHROID) 137 MCG tablet, Take 1 tablet by mouth Daily., Disp: , Rfl:   •  PARoxetine (PAXIL) 30 MG tablet, Take 1 tablet by mouth Daily., Disp: , Rfl:   •  rosuvastatin (CRESTOR) 5 MG tablet, Take 5 mg by mouth Daily., Disp: , Rfl:   •  albuterol sulfate  (90 Base) MCG/ACT inhaler, albuterol sulfate HFA 90 mcg/actuation aerosol inhaler  2 PUFFS EVERY 6 HOURS AS NEEDED FOR SHORTNESS OF BREATH/WHEEZING, Disp: , Rfl:   •  cyanocobalamin 1000 MCG/ML injection, Inject 1,000 mcg into the appropriate muscle as directed by prescriber Every 30 (Thirty) Days., Disp: , Rfl: 4  •  folic acid (FOLVITE) 800 MCG tablet, Take 800 mcg by mouth Daily. 800mcg daily, Disp: , Rfl:   Social History:   Social History     Tobacco Use   • Smoking status: Never Smoker   • Smokeless tobacco: Never Used   Substance Use Topics   • Alcohol use: Not Currently      Family History:  History  "reviewed. No pertinent family history.           Review of Systems   Constitutional: Negative for malaise/fatigue.   Cardiovascular: Positive for leg swelling. Negative for chest pain and palpitations.   Respiratory: Positive for shortness of breath.    Skin: Negative for rash.   Neurological: Negative for dizziness, light-headedness and numbness.     All other systems are negative         Objective:     Physical Exam  /79   Pulse 66   Ht 157.5 cm (62\")   Wt 97.1 kg (214 lb)   SpO2 99%   BMI 39.14 kg/m²   General:  Appears in no acute distress  Eyes: Sclera is anicteric,  conjunctiva is clear   HEENT:  No JVD.  No carotid bruits  Respiratory: Respirations regular and unlabored at rest.  Clear to auscultation  Cardiovascular: S1,S2 Regular rate and rhythm. No murmur, rub or gallop auscultated.   Extremities: No digital clubbing or cyanosis, no edema  Skin: Color pink. Skin warm and dry to touch. No rashes  No xanthoma  Neuro: Alert and awake.    Lab Reviewed:         Eric Low MD  3/15/2022 10:12 EDT      Much of the above report is an electronic transcription/translation of the spoken language to printed text using Dragon Software. As such, the subtleties and finesse of the spoken language may permit erroneous, or at times, nonsensical words or phrases to be inadvertently transcribed; thus changes may be made at a later date to rectify these errors.         "

## 2022-03-16 LAB
ANA TITR SER IF: NEGATIVE {TITER}
CCP IGA+IGG SERPL IA-ACNC: 7 UNITS (ref 0–19)
ENA RNP AB SER-ACNC: <0.2 AI (ref 0–0.9)
ENA SM AB SER-ACNC: <0.2 AI (ref 0–0.9)

## 2022-06-21 ENCOUNTER — OFFICE VISIT (OUTPATIENT)
Dept: PULMONOLOGY | Facility: HOSPITAL | Age: 77
End: 2022-06-21

## 2022-06-21 VITALS
RESPIRATION RATE: 14 BRPM | OXYGEN SATURATION: 96 % | HEART RATE: 74 BPM | WEIGHT: 210 LBS | DIASTOLIC BLOOD PRESSURE: 80 MMHG | BODY MASS INDEX: 38.64 KG/M2 | HEIGHT: 62 IN | SYSTOLIC BLOOD PRESSURE: 142 MMHG | TEMPERATURE: 98.5 F

## 2022-06-21 DIAGNOSIS — I27.20 PULMONARY HYPERTENSION: ICD-10-CM

## 2022-06-21 DIAGNOSIS — G47.30 SLEEP APNEA, UNSPECIFIED TYPE: ICD-10-CM

## 2022-06-21 DIAGNOSIS — J84.9 ILD (INTERSTITIAL LUNG DISEASE): Primary | ICD-10-CM

## 2022-06-21 DIAGNOSIS — R06.09 DYSPNEA ON EXERTION: ICD-10-CM

## 2022-06-21 PROCEDURE — G0463 HOSPITAL OUTPT CLINIC VISIT: HCPCS

## 2022-06-21 NOTE — PROGRESS NOTES
Subjective   Ally Ivory is a 76 y.o. female.     History of Present Illness   F/u for shortness of breath and abnormal CT scan  Shortness of breath started years ago but progressively getting worse, also having dry cough.  No fever or weight loss     Never smoked but exposed to secondhand smoking.  No indoor pets, no exposure to birds.  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     Father had chronic lung disease but probably from smoking pipe, no known history of interstitial lung disease    Patient Active Problem List   Diagnosis   • Abnormal cardiovascular stress test   • Angina pectoris (HCC)   • Chest pain   • Fibromyalgia   • Cholelithiasis   • Chronic diastolic heart failure (HCC)   • Coronary arteriosclerosis in native artery   • Chronic coronary artery disease   • Depressive disorder   • Dermatitis   • Diabetes mellitus (HCC)   • Difficult or painful urination   • Dizziness   • Dyslipidemia   • Dyspnea on exertion   • Ecchymosis   • Edema   • Fatigue   • Gallbladder stone with nonacute cholecystitis   • Hyperlipidemia   • Hypertension   • Hypothyroidism   • Long term current use of anticoagulant therapy   • Inflammatory arthritis   • Lupus (HCC)   • Sjogren's syndrome (HCC)   • Systemic lupus erythematosus (HCC)   • Aortic insufficiency   • Mitral regurgitation   • Tricuspid insufficiency   • Obesity   • Osteopenia   • Osteoporosis   • Overweight   • Paroxysmal atrial fibrillation (HCC)   • Premature atrial contraction   • Presence of Watchman left atrial appendage closure device   • Psoriasis   • Raynaud's phenomenon   • Sleep apnea   • Status post percutaneous transluminal coronary angioplasty   • Thrombocytopenia (HCC)   • Trochanteric bursitis of left hip   • Vitamin D deficiency   • Odynophagia     Current Outpatient Medications on File Prior to Visit   Medication Sig Dispense Refill   • albuterol sulfate HFA  108 (90 Base) MCG/ACT inhaler albuterol sulfate HFA 90 mcg/actuation aerosol inhaler   2 PUFFS EVERY 6 HOURS AS NEEDED FOR SHORTNESS OF BREATH/WHEEZING     • cyanocobalamin 1000 MCG/ML injection Inject 1,000 mcg into the appropriate muscle as directed by prescriber Every 30 (Thirty) Days.  4   • dilTIAZem CD (CARDIZEM CD) 120 MG 24 hr capsule Take 120 mg by mouth 2 (Two) Times a Day.     • Fluticasone-Umeclidin-Vilant (Trelegy Ellipta) 200-62.5-25 MCG/INH inhaler Inhale 1 puff Daily. 1 each 0   • Fluticasone-Umeclidin-Vilant (TRELEGY) 100-62.5-25 MCG/INH inhaler Inhale 1 puff Daily. 1 each 11   • folic acid (FOLVITE) 800 MCG tablet Take 800 mcg by mouth Daily. 800mcg daily     • hydroxychloroquine (PLAQUENIL) 200 MG tablet Take 200 mg by mouth Daily.     • isosorbide mononitrate (IMDUR) 30 MG 24 hr tablet Take 1 tablet by mouth Daily.     • levothyroxine (SYNTHROID, LEVOTHROID) 137 MCG tablet Take 1 tablet by mouth Daily.     • PARoxetine (PAXIL) 30 MG tablet Take 1 tablet by mouth Daily.     • rosuvastatin (CRESTOR) 5 MG tablet Take 5 mg by mouth Daily.     • amLODIPine (NORVASC) 10 MG tablet Take 1 tablet by mouth Daily. 90 tablet 3   • aspirin (aspirin) 81 MG EC tablet Take 1 tablet by mouth Daily. 90 tablet 3     No current facility-administered medications on file prior to visit.       The following portions of the patient's history were reviewed and updated as appropriate: allergies, current medications, past family history, past medical history, past social history, past surgical history and problem list.    Review of Systems   Constitutional: Negative for chills, fever and malaise/fatigue.   HENT: Negative.    Eyes: Negative.    Cardiovascular: Negative.    Respiratory: Positive for cough and shortness of breath.    Skin: Negative.    Musculoskeletal: Negative.    Gastrointestinal: Negative.    Genitourinary: Negative.    Neurological: Negative.    Psychiatric/Behavioral: Negative.    Objective   Physical  "Exam  Blood pressure 142/80, pulse 74, temperature 98.5 °F (36.9 °C), temperature source Oral, resp. rate 14, height 157.5 cm (62\"), weight 95.3 kg (210 lb), SpO2 96 %.  General Appearance:  Alert   HEENT:  Normocephalic, without obvious abnormality, Conjunctiva/corneas clear,.   Nares normal, no drainage     Neck:  Supple, symmetrical, trachea midline. No JVD.  Lungs /Chest wall:   Mild bibasilar dry crackles, respirations unlabored, symmetrical wall movement.     Heart:  Regular rate and rhythm, loud S1 S2 normal and 2 x 6 systolic murmur  Abdomen: Soft, non-tender, no masses, no organomegaly.    Extremities: No edema, no clubbing or cyanosis    Results for orders placed during the hospital encounter of 10/04/21    Adult Transesophageal Echo (TON) W/ Cont if Necessary Per Protocol    Interpretation Summary  TON   procedure note    Procedure:  TON    Indication:   georgette Adams    Date of procedure  : 10/4/2021    :  Dr. Eric Low    Description of procedure:    Under conscious sedation given by anesthesiology and local anesthesia, a TON probe was advanced into the esophagus and into the stomach 2D, M-mode, color Doppler images were obtained..  Patient tolerated procedure well complications well.    Complications: none    Results:    Normal LV size and contractility LV contractility, EF of 60%  Normal RV size  Biatrial enlargement seen.  Left atrial appendage has watchman left atrial appendage occluder device seated well.  Aortic valve, mitral valve, tricuspid valve appears structurally normal, moderate aortic, mitral, tricuspid seen.  Elevated RV systolic pressures at 58 mmHg consistent with pulmonary hypertension seen  No vegetation seen  No pericardial effusion seen.  Proximal aorta appears normal in size.  Mild aortic plaque seen      Recommendations/plan:    Clinical correlation recommended      Assessment & Plan   Interstitial lung disease: Most likely related to connective tissue disease, " it has progressed on CT scan from 2013 to August 2021 until the last CT scan January 2022  History of lupus and rheumatoid arthritis: Currently on hydroxychloroquine but has not seen her rheumatologist for a long time  PFTs done December 2021 shows FVC 55% improving to 69% after bronchodilators, FEV1 54% and improving to 76% after bronchodilators, TLC 77%, DLCO 47%  Ct  Trelegy 1 inhalation once a day:  prescription given and patient was instructed how to use it     Discussed with the patient the need to increase her immunosuppression, at this point she will make an appointment with her rheumatologist to review the lab results and decide about immunosuppression.    Pulmonary hypertension: TON 10/2021 Elevated RV systolic pressures at 58 mmHg consistent with pulmonary hypertension seen, moderate AI and  MR: can't use Sildenafil as long as she is using Imdur and with valvular heart disease Sildenafil is not the preferred treatment..  CAD s/p Stent, currently on Imdur and followed by Dr Maranda ALEXANDRE Fib s/p  watchman,  hypertension, dyslipidemia, h/o bradycardia : improved after stopping BB.    Excessive daytime sleepiness: Possible ELIS, will check home sleep study       W/u in 3/15/2022 with no signs of active inflammation      Follow-up 3 months with CT scan of chest and PFT, if there is progression then consider antifibrotics

## 2022-06-22 DIAGNOSIS — Z01.812 PRE-PROCEDURE LAB EXAM: Primary | ICD-10-CM

## 2022-08-02 ENCOUNTER — OFFICE VISIT (OUTPATIENT)
Dept: CARDIOLOGY | Facility: CLINIC | Age: 77
End: 2022-08-02

## 2022-08-02 VITALS
HEIGHT: 62 IN | DIASTOLIC BLOOD PRESSURE: 69 MMHG | SYSTOLIC BLOOD PRESSURE: 100 MMHG | HEART RATE: 75 BPM | BODY MASS INDEX: 36.62 KG/M2 | OXYGEN SATURATION: 92 % | WEIGHT: 199 LBS

## 2022-08-02 DIAGNOSIS — E66.9 OBESITY (BMI 30-39.9): ICD-10-CM

## 2022-08-02 DIAGNOSIS — I10 ESSENTIAL HYPERTENSION: ICD-10-CM

## 2022-08-02 DIAGNOSIS — I95.1 AUTONOMIC ORTHOSTATIC HYPOTENSION: Primary | ICD-10-CM

## 2022-08-02 DIAGNOSIS — I34.0 NONRHEUMATIC MITRAL VALVE REGURGITATION: ICD-10-CM

## 2022-08-02 DIAGNOSIS — I27.20 PULMONARY HYPERTENSION: ICD-10-CM

## 2022-08-02 DIAGNOSIS — I48.0 PAROXYSMAL ATRIAL FIBRILLATION: ICD-10-CM

## 2022-08-02 DIAGNOSIS — I25.10 CAD S/P PERCUTANEOUS CORONARY ANGIOPLASTY: ICD-10-CM

## 2022-08-02 DIAGNOSIS — Z98.61 CAD S/P PERCUTANEOUS CORONARY ANGIOPLASTY: ICD-10-CM

## 2022-08-02 PROCEDURE — 93000 ELECTROCARDIOGRAM COMPLETE: CPT | Performed by: INTERNAL MEDICINE

## 2022-08-02 PROCEDURE — 99214 OFFICE O/P EST MOD 30 MIN: CPT | Performed by: INTERNAL MEDICINE

## 2022-08-02 RX ORDER — BISOPROLOL FUMARATE 10 MG/1
5 TABLET, FILM COATED ORAL DAILY
Qty: 90 TABLET | Refills: 3 | Status: SHIPPED | OUTPATIENT
Start: 2022-08-02

## 2022-08-02 RX ORDER — MYCOPHENOLATE MOFETIL 500 MG/1
TABLET ORAL 2 TIMES DAILY
COMMUNITY

## 2022-08-02 NOTE — PROGRESS NOTES
Subjective:     Encounter Date:08/02/2022      Patient ID: Ally Ivory is a 76 y.o. female.    Chief Complaint :Follow-up for A. fib, watchman, CAD, PCI, hypertension, dyslipidemia, bradycardia    History of Present Illness         Ms. Ally Ivory has PMH of     #.CAD, JUAN , cath 5/9/18 Severe disease in OM1 branch of LCX,FFR RCA 0.97  Patent stent in ostial right with moderate InStent stenosis and noncritical FFR at 0.90  Elevated  LVEDP with normal LV systolic function. cath  01/21/2019 PTCA to instent  RCA.  Repeat stent to RCA in [place] in 2019  #. CAROLYN-TACHY SYNDROME with  P. A.FIB  , long-term anticoagulation, s/p watchman 8/19/2021, post watchman 1 year TON 10/4/21 revealed a PA systolic pressure 58 mmHg  #.  Moderate aortic regurgitation and mitral regurgitation  #   dyslipidemia,statin allergy  #  hypertension, positive family history, obesity, hypothyroidism.  #  history of lupus, thrombocytopenia, pernicious anemia. psoriasis, Raynaud's, Sjogren's, RA  #.  Interstitial lung disease, PFTs 12/2021 FVC 55%-improved to 69 after bronchodilators, FEV1 54% improved to 76%, TLC 77%, DLCO 47%-/10 Becca Chan MD  #.  Pulmonary hypertension, TON 10/2021 elevated RV systolic pressure 58 mmHg  #   cholecystectomy  #.  moderate MR and -moderate AI, last echo 20 60  #  type 2 diabetes. (Patient says she is not diabetic.)        Here for   followup.  Patient is complaining of feeling poorly and dizzy.  Recently was started on CellCept by neurology.  Patient is severely orthostatic.    Patient's arterial blood pressure is  137/72, heart rate 60 supine: 103/65, heart rate 72 sitting, 93/54, heart rate 66 standing. on room air.  Patient's BMI is over 30.    Patient  had labs done with PMD Dr. Vasquez. and reportedly sugars are running good denies any hypoglycemia episodes. patient had labs done 08/20/2018 with rheumatology CMP was unremarkable.  Labs done 11/22/2019 revealed normal CMP, CK, cholesterol  of 191, HDL 40  triglycerides 148, Labs from 11/15/2020 reveal cholesterol 95 triglycerides 90 HDL 41 LDL 36 CMP and Ck unremarkable.  Labs from 6/3/2021 revealed normal CMP, CBC with a platelet count of 114.  Labs 8/19/2021 revealed normal CBC with platelet count of 77 and Chem-7 with low calcium. Labs from 10/1/2021 revealed normal Chem-7 and CBC  CT chest with contrast done 1/26/2022 revealed symmetric groundglass opacities seen throughout all lung fields likely represent pulmonary edema or other differential include infectious and inflammatory etiologies clinical correlation recommended.  Patient had splenomegaly         ASSESSMENT:    -Orthostatic hypotension  -Abnormal CT chest  -Fatigue, dyspnea on exertion  -Obesity BMI over 36  -Pulmonary hypertension  #Atrial fibrillation, status post watchman  #Thrombocytopenia  #Sinus node dysfunction with junctional bradycardia  #Dyslipidemia  # . chronic CHF due to diastolic dysfunction with normal LV systolic function in the past  #.   CAD ,PCI  #.  hypertension, dyslipidemia  #.   ambrocio-tachy syndrome , s/p ILR  #  PVC  #.  moderate MR and-moderate AI      PLAN:    Reviewed EKG results with patient  Patient is severely orthostatic will discontinue all her medications including isosorbide, calcium channel blockers.  Give her low-dose bisoprolol.  Continue Crestor.  Follow-up with rheumatology.    Patient is thrombocytopenic we will hold off on aspirin.  Continue medical management with bisoprolol, rosuvastatin to help with A. fib, CHF, CAD, PCI, hypertension, dyslipidemia as tolerated.  Patient's [id number] over is 5,due to female gender, age over 75, hypertension, congestive heart failure ,will benefit from long-term anticoagulation.  But patient has thrombocytopenia which makes her a poor candidate for long-term anticoagulation.  Therefore underwent watchman.    Patient has previous history of bradycardia and sinus node dysfunction we will continue symptoms  and monitor rhythm closely.    Advised patient to check heart rate and blood pressure now that she is being started on low-dose beta-blockers.  Patient's BMI is over 30, counseled on weight loss diet and exercise.  Follow-up with pulmonary and rheumatology.  Advised her to wear knee-high stockings.        ECG 12 Lead    Date/Time: 8/2/2022 11:49 AM  Performed by: Eric Low MD  Authorized by: Eric Low MD   Comparison: compared with previous ECG from 10/1/2021  Comparison to previous ECG: EKG done today reviewed/interpreted by me reveals sinus rhythm with rate of 65 bpm, no new change compared EKG from 10/1/2021              The following portions of the patient's history were reviewed and updated as appropriate: allergies, current medications, past family history, past medical history, past social history, past surgical history and problem list.    Assessment:         [facility]     Diagnosis Plan   1. Autonomic orthostatic hypotension     2. Paroxysmal atrial fibrillation (HCC)     3. Essential hypertension     4. CAD S/P percutaneous coronary angioplasty     5. Nonrheumatic mitral valve regurgitation     6. Obesity (BMI 30-39.9)     7. Pulmonary hypertension (HCC)            Plan:               Past Medical History:  Past Medical History:   Diagnosis Date   • Atrial fibrillation (HCC)    • Coronary artery disease    • Dyslipidemia    • Hypertension    • Hypothyroidism    • Lung fibrosis (HCC)    • Lupus (HCC)    • Lupus (HCC)    • Raynaud's disease      Past Surgical History:  Past Surgical History:   Procedure Laterality Date   • ATRIAL APPENDAGE EXCLUSION LEFT WITH TRANSESOPHAGEAL ECHOCARDIOGRAM N/A 8/19/2021    Procedure: Atrial Appendage Occlusion;  Surgeon: Eric Low MD;  Location: [facility] INVASIVE LOCATION;  Service: Cardiovascular;  Laterality: N/A;   • ATRIAL APPENDAGE EXCLUSION LEFT WITH TRANSESOPHAGEAL ECHOCARDIOGRAM N/A 8/19/2021    Procedure: Atrial  Appendage Occlusion;  Surgeon: Francisco Garza MD;  Location: [facility] CATH INVASIVE LOCATION;  Service: Cardiovascular;  Laterality: N/A;   • CARDIAC CATHETERIZATION     • CATARACT EXTRACTION     • CHOLECYSTECTOMY     • ENDOSCOPY N/A 8/20/2021    Procedure: ESOPHAGOGASTRODUODENOSCOPY;  Surgeon: Joao Cross MD;  Location: [facility] ENDOSCOPY;  Service: Gastroenterology;  Laterality: N/A;  submucosal ecchymosis 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   • HYSTERECTOMY     • REPLACEMENT TOTAL KNEE BILATERAL        Allergies:  Allergies   Allergen Reactions   • Pravastatin Rash     rash     • Vancomycin Hives     Home Meds:  Current Meds:     Current Outpatient Medications:   •  albuterol sulfate  (90 Base) MCG/ACT inhaler, albuterol sulfate HFA 90 mcg/actuation aerosol inhaler  2 PUFFS EVERY 6 HOURS AS NEEDED FOR SHORTNESS OF BREATH/WHEEZING, Disp: , Rfl:   •  cyanocobalamin 1000 MCG/ML injection, Inject 1,000 mcg into the appropriate muscle as directed by prescriber Every 30 (Thirty) Days., Disp: , Rfl: 4  •  Fluticasone-Umeclidin-Vilant (TRELEGY) 100-62.5-25 MCG/INH inhaler, Inhale 1 puff Daily., Disp: 1 each, Rfl: 11  •  hydroxychloroquine (PLAQUENIL) 200 MG tablet, Take 200 mg by mouth Daily., Disp: , Rfl:   •  levothyroxine (SYNTHROID, LEVOTHROID) 137 MCG tablet, Take 1 tablet by mouth Daily., Disp: , Rfl:   •  mycophenolate (CELLCEPT) 500 MG tablet, Take  by mouth 2 (Two) Times a Day., Disp: , Rfl:   •  PARoxetine (PAXIL) 30 MG tablet, Take 1 tablet by mouth Daily., Disp: , Rfl:   •  rosuvastatin (CRESTOR) 5 MG tablet, Take 5 mg by mouth Daily., Disp: , Rfl:   •  bisoprolol (ZEBeta) 10 MG tablet, Take 0.5 tablets by mouth Daily., Disp: 90 tablet, Rfl: 3  •  Fluticasone-Umeclidin-Vilant (Trelegy Ellipta) 200-62.5-25 MCG/INH inhaler, Inhale 1 puff Daily., Disp: 1 each, Rfl: 0  •  folic acid (FOLVITE) 800 MCG tablet, Take 800 mcg by mouth Daily. 800mcg daily, Disp: , Rfl:   Social History:  "  Social History     Tobacco Use   • Smoking status: Never Smoker   • Smokeless tobacco: Never Used   Substance Use Topics   • Alcohol use: Not Currently      Family History:  Family History   Problem Relation Age of Onset   • No Known Problems Mother    • Lung disease Father               Review of Systems   Constitutional: Positive for malaise/fatigue.   Cardiovascular: Negative for chest pain, leg swelling and palpitations.   Respiratory: Positive for shortness of breath.    Skin: Negative for rash.   Neurological: Positive for dizziness and light-headedness. Negative for numbness.     All other systems are negative         Objective:     Physical Exam  /69   Pulse 75   Ht 157.5 cm (62\")   Wt 90.3 kg (199 lb)   SpO2 92%   BMI 36.40 kg/m²   General:  Appears in no acute distress, pleasant obese  Eyes: Sclera is anicteric,  conjunctiva is clear   HEENT:  No JVD.  No carotid bruits  Respiratory: Respirations regular and unlabored at rest.  Clear to auscultation  Cardiovascular: S1,S2 Regular rate and rhythm. No murmur, rub or gallop auscultated.   Extremities: No digital clubbing or cyanosis, no edema  Skin: Color pink. Skin warm and dry to touch. No rashes  No xanthoma  Neuro: Alert and awake.    Lab Reviewed:         Eric Low MD  8/2/2022 12:02 EDT      EMR Dragon/Transcription:   \"Dictated utilizing Dragon dictation\".        "

## 2022-08-05 ENCOUNTER — APPOINTMENT (OUTPATIENT)
Dept: NUCLEAR MEDICINE | Facility: HOSPITAL | Age: 77
End: 2022-08-05

## 2022-08-05 ENCOUNTER — HOSPITAL ENCOUNTER (OUTPATIENT)
Facility: HOSPITAL | Age: 77
Setting detail: OBSERVATION
Discharge: HOME | End: 2022-08-05
Attending: EMERGENCY MEDICINE | Admitting: EMERGENCY MEDICINE

## 2022-08-05 ENCOUNTER — APPOINTMENT (OUTPATIENT)
Dept: GENERAL RADIOLOGY | Facility: HOSPITAL | Age: 77
End: 2022-08-05

## 2022-08-05 VITALS
SYSTOLIC BLOOD PRESSURE: 139 MMHG | BODY MASS INDEX: 32.81 KG/M2 | HEIGHT: 66 IN | TEMPERATURE: 97.6 F | RESPIRATION RATE: 20 BRPM | WEIGHT: 204.15 LBS | HEART RATE: 63 BPM | DIASTOLIC BLOOD PRESSURE: 80 MMHG | OXYGEN SATURATION: 95 %

## 2022-08-05 DIAGNOSIS — R07.2 PRECORDIAL PAIN: Primary | ICD-10-CM

## 2022-08-05 LAB
ALBUMIN SERPL-MCNC: 4.2 G/DL (ref 3.5–5.2)
ALBUMIN/GLOB SERPL: 1.8 G/DL
ALP SERPL-CCNC: 68 U/L (ref 39–117)
ALT SERPL W P-5'-P-CCNC: 15 U/L (ref 1–33)
ANION GAP SERPL CALCULATED.3IONS-SCNC: 8 MMOL/L (ref 5–15)
AST SERPL-CCNC: 20 U/L (ref 1–32)
BASOPHILS # BLD AUTO: 0 10*3/MM3 (ref 0–0.2)
BASOPHILS NFR BLD AUTO: 0.3 % (ref 0–1.5)
BH CV NUCLEAR PRIOR STUDY: 3
BH CV REST NUCLEAR ISOTOPE DOSE: 10 MCI
BH CV STRESS BP STAGE 1: NORMAL
BH CV STRESS COMMENTS STAGE 1: NORMAL
BH CV STRESS DOSE REGADENOSON STAGE 1: 0.4
BH CV STRESS DURATION MIN STAGE 1: 0
BH CV STRESS DURATION SEC STAGE 1: 10
BH CV STRESS HR STAGE 1: 63
BH CV STRESS NUCLEAR ISOTOPE DOSE: 32.5 MCI
BH CV STRESS PROTOCOL 1: NORMAL
BH CV STRESS RECOVERY BP: NORMAL MMHG
BH CV STRESS RECOVERY HR: 66 BPM
BH CV STRESS STAGE 1: 1
BILIRUB SERPL-MCNC: 0.7 MG/DL (ref 0–1.2)
BUN SERPL-MCNC: 12 MG/DL (ref 8–23)
BUN/CREAT SERPL: 15.6 (ref 7–25)
CALCIUM SPEC-SCNC: 8.8 MG/DL (ref 8.6–10.5)
CHLORIDE SERPL-SCNC: 105 MMOL/L (ref 98–107)
CO2 SERPL-SCNC: 28 MMOL/L (ref 22–29)
CREAT SERPL-MCNC: 0.77 MG/DL (ref 0.57–1)
DEPRECATED RDW RBC AUTO: 46.4 FL (ref 37–54)
EGFRCR SERPLBLD CKD-EPI 2021: 79.6 ML/MIN/1.73
EOSINOPHIL # BLD AUTO: 0.2 10*3/MM3 (ref 0–0.4)
EOSINOPHIL NFR BLD AUTO: 3.1 % (ref 0.3–6.2)
ERYTHROCYTE [DISTWIDTH] IN BLOOD BY AUTOMATED COUNT: 14.6 % (ref 12.3–15.4)
GLOBULIN UR ELPH-MCNC: 2.4 GM/DL
GLUCOSE SERPL-MCNC: 94 MG/DL (ref 65–99)
HCT VFR BLD AUTO: 38.7 % (ref 34–46.6)
HGB BLD-MCNC: 13.2 G/DL (ref 12–15.9)
LV EF NUC BP: 66 %
LYMPHOCYTES # BLD AUTO: 0.8 10*3/MM3 (ref 0.7–3.1)
LYMPHOCYTES NFR BLD AUTO: 13.1 % (ref 19.6–45.3)
MAXIMAL PREDICTED HEART RATE: 143 BPM
MCH RBC QN AUTO: 30.7 PG (ref 26.6–33)
MCHC RBC AUTO-ENTMCNC: 34.1 G/DL (ref 31.5–35.7)
MCV RBC AUTO: 90 FL (ref 79–97)
MONOCYTES # BLD AUTO: 0.5 10*3/MM3 (ref 0.1–0.9)
MONOCYTES NFR BLD AUTO: 7.9 % (ref 5–12)
NEUTROPHILS NFR BLD AUTO: 4.3 10*3/MM3 (ref 1.7–7)
NEUTROPHILS NFR BLD AUTO: 75.6 % (ref 42.7–76)
NRBC BLD AUTO-RTO: 0.1 /100 WBC (ref 0–0.2)
NT-PROBNP SERPL-MCNC: 603.8 PG/ML (ref 0–1800)
PERCENT MAX PREDICTED HR: 56.64 %
PLATELET # BLD AUTO: 91 10*3/MM3 (ref 140–450)
PMV BLD AUTO: 10 FL (ref 6–12)
POTASSIUM SERPL-SCNC: 3.9 MMOL/L (ref 3.5–5.2)
PROT SERPL-MCNC: 6.6 G/DL (ref 6–8.5)
QT INTERVAL: 471 MS
RBC # BLD AUTO: 4.3 10*6/MM3 (ref 3.77–5.28)
SARS-COV-2 RNA PNL SPEC NAA+PROBE: NOT DETECTED
SODIUM SERPL-SCNC: 141 MMOL/L (ref 136–145)
STRESS BASELINE BP: NORMAL MMHG
STRESS BASELINE HR: 57 BPM
STRESS PERCENT HR: 67 %
STRESS POST PEAK BP: NORMAL MMHG
STRESS POST PEAK HR: 81 BPM
STRESS TARGET HR: 122 BPM
TROPONIN T SERPL-MCNC: 0.01 NG/ML (ref 0–0.03)
TROPONIN T SERPL-MCNC: <0.01 NG/ML (ref 0–0.03)
WBC NRBC COR # BLD: 5.7 10*3/MM3 (ref 3.4–10.8)

## 2022-08-05 PROCEDURE — G0378 HOSPITAL OBSERVATION PER HR: HCPCS

## 2022-08-05 PROCEDURE — 25010000002 REGADENOSON 0.4 MG/5ML SOLUTION: Performed by: EMERGENCY MEDICINE

## 2022-08-05 PROCEDURE — 36415 COLL VENOUS BLD VENIPUNCTURE: CPT

## 2022-08-05 PROCEDURE — 97161 PT EVAL LOW COMPLEX 20 MIN: CPT

## 2022-08-05 PROCEDURE — 93005 ELECTROCARDIOGRAM TRACING: CPT

## 2022-08-05 PROCEDURE — A9502 TC99M TETROFOSMIN: HCPCS | Performed by: EMERGENCY MEDICINE

## 2022-08-05 PROCEDURE — 85025 COMPLETE CBC W/AUTO DIFF WBC: CPT | Performed by: EMERGENCY MEDICINE

## 2022-08-05 PROCEDURE — 99284 EMERGENCY DEPT VISIT MOD MDM: CPT

## 2022-08-05 PROCEDURE — 87635 SARS-COV-2 COVID-19 AMP PRB: CPT | Performed by: EMERGENCY MEDICINE

## 2022-08-05 PROCEDURE — 78452 HT MUSCLE IMAGE SPECT MULT: CPT | Performed by: INTERNAL MEDICINE

## 2022-08-05 PROCEDURE — 78452 HT MUSCLE IMAGE SPECT MULT: CPT

## 2022-08-05 PROCEDURE — 93005 ELECTROCARDIOGRAM TRACING: CPT | Performed by: EMERGENCY MEDICINE

## 2022-08-05 PROCEDURE — C9803 HOPD COVID-19 SPEC COLLECT: HCPCS

## 2022-08-05 PROCEDURE — 83880 ASSAY OF NATRIURETIC PEPTIDE: CPT | Performed by: NURSE PRACTITIONER

## 2022-08-05 PROCEDURE — 84484 ASSAY OF TROPONIN QUANT: CPT | Performed by: EMERGENCY MEDICINE

## 2022-08-05 PROCEDURE — 80053 COMPREHEN METABOLIC PANEL: CPT | Performed by: EMERGENCY MEDICINE

## 2022-08-05 PROCEDURE — 0 TECHNETIUM TETROFOSMIN KIT: Performed by: EMERGENCY MEDICINE

## 2022-08-05 PROCEDURE — 84484 ASSAY OF TROPONIN QUANT: CPT | Performed by: NURSE PRACTITIONER

## 2022-08-05 PROCEDURE — 93017 CV STRESS TEST TRACING ONLY: CPT

## 2022-08-05 PROCEDURE — 93018 CV STRESS TEST I&R ONLY: CPT | Performed by: INTERNAL MEDICINE

## 2022-08-05 PROCEDURE — 71045 X-RAY EXAM CHEST 1 VIEW: CPT

## 2022-08-05 RX ORDER — SODIUM CHLORIDE 0.9 % (FLUSH) 0.9 %
10 SYRINGE (ML) INJECTION AS NEEDED
Status: DISCONTINUED | OUTPATIENT
Start: 2022-08-05 | End: 2022-08-05 | Stop reason: HOSPADM

## 2022-08-05 RX ORDER — NITROGLYCERIN 0.4 MG/1
0.4 TABLET SUBLINGUAL
Qty: 30 TABLET | Refills: 12 | Status: SHIPPED | OUTPATIENT
Start: 2022-08-05

## 2022-08-05 RX ORDER — ALBUTEROL SULFATE 2.5 MG/3ML
2.5 SOLUTION RESPIRATORY (INHALATION) EVERY 6 HOURS PRN
Status: DISCONTINUED | OUTPATIENT
Start: 2022-08-05 | End: 2022-08-05 | Stop reason: HOSPADM

## 2022-08-05 RX ORDER — SODIUM CHLORIDE 0.9 % (FLUSH) 0.9 %
10 SYRINGE (ML) INJECTION EVERY 12 HOURS SCHEDULED
Status: DISCONTINUED | OUTPATIENT
Start: 2022-08-05 | End: 2022-08-05 | Stop reason: HOSPADM

## 2022-08-05 RX ORDER — POLYETHYLENE GLYCOL 3350 17 G/17G
17 POWDER, FOR SOLUTION ORAL DAILY PRN
Status: DISCONTINUED | OUTPATIENT
Start: 2022-08-05 | End: 2022-08-05 | Stop reason: HOSPADM

## 2022-08-05 RX ORDER — ONDANSETRON 2 MG/ML
4 INJECTION INTRAMUSCULAR; INTRAVENOUS EVERY 6 HOURS PRN
Status: DISCONTINUED | OUTPATIENT
Start: 2022-08-05 | End: 2022-08-05 | Stop reason: HOSPADM

## 2022-08-05 RX ORDER — BISACODYL 5 MG/1
5 TABLET, DELAYED RELEASE ORAL DAILY PRN
Status: DISCONTINUED | OUTPATIENT
Start: 2022-08-05 | End: 2022-08-05 | Stop reason: HOSPADM

## 2022-08-05 RX ORDER — ASPIRIN 81 MG/1
324 TABLET, CHEWABLE ORAL ONCE
Status: COMPLETED | OUTPATIENT
Start: 2022-08-05 | End: 2022-08-05

## 2022-08-05 RX ORDER — ACETAMINOPHEN 325 MG/1
650 TABLET ORAL EVERY 4 HOURS PRN
Status: DISCONTINUED | OUTPATIENT
Start: 2022-08-05 | End: 2022-08-05 | Stop reason: HOSPADM

## 2022-08-05 RX ORDER — BISACODYL 10 MG
10 SUPPOSITORY, RECTAL RECTAL DAILY PRN
Status: DISCONTINUED | OUTPATIENT
Start: 2022-08-05 | End: 2022-08-05 | Stop reason: HOSPADM

## 2022-08-05 RX ORDER — HYDROXYCHLOROQUINE SULFATE 200 MG/1
200 TABLET, FILM COATED ORAL DAILY
Status: DISCONTINUED | OUTPATIENT
Start: 2022-08-05 | End: 2022-08-05 | Stop reason: HOSPADM

## 2022-08-05 RX ORDER — ONDANSETRON 4 MG/1
4 TABLET, FILM COATED ORAL EVERY 6 HOURS PRN
Status: DISCONTINUED | OUTPATIENT
Start: 2022-08-05 | End: 2022-08-05 | Stop reason: HOSPADM

## 2022-08-05 RX ORDER — ROSUVASTATIN CALCIUM 5 MG/1
5 TABLET, COATED ORAL DAILY
Status: DISCONTINUED | OUTPATIENT
Start: 2022-08-05 | End: 2022-08-05 | Stop reason: HOSPADM

## 2022-08-05 RX ORDER — BISOPROLOL FUMARATE 5 MG/1
5 TABLET, FILM COATED ORAL DAILY
Status: DISCONTINUED | OUTPATIENT
Start: 2022-08-05 | End: 2022-08-05 | Stop reason: HOSPADM

## 2022-08-05 RX ADMIN — TETROFOSMIN 1 DOSE: 1.38 INJECTION, POWDER, LYOPHILIZED, FOR SOLUTION INTRAVENOUS at 14:00

## 2022-08-05 RX ADMIN — ASPIRIN 81 MG CHEWABLE TABLET 324 MG: 81 TABLET CHEWABLE at 08:15

## 2022-08-05 RX ADMIN — REGADENOSON 0.4 MG: 0.08 INJECTION, SOLUTION INTRAVENOUS at 14:00

## 2022-08-05 RX ADMIN — TETROFOSMIN 1 DOSE: 1.38 INJECTION, POWDER, LYOPHILIZED, FOR SOLUTION INTRAVENOUS at 13:06

## 2022-08-05 NOTE — PLAN OF CARE
Goal Outcome Evaluation:              Problem: Adult Inpatient Plan of Care  Goal: Plan of Care Review  Outcome: Ongoing, Progressing  Flowsheets (Taken 8/5/2022 1252)  Progress: no change  Plan of Care Reviewed With: patient  Outcome Evaluation: Patient new admit from ED with chest pain.

## 2022-08-05 NOTE — CASE MANAGEMENT/SOCIAL WORK
Discharge Planning Assessment  [facility]     Patient Name: Ally Ivory  MRN: [id number]  Today's Date: 8/5/2022    Admit Date: 8/5/2022     Discharge Needs Assessment     Row Name 08/05/22 1208       Living Environment    People in Home spouse    Name(s) of People in Home Spouse chet    Current Living Arrangements home    Primary Care Provided by self    Provides Primary Care For no one    Family Caregiver if Needed child(monty), adult;spouse    Family Caregiver Names Spouse Chet. Pt and spouse report son and daughter in law (Damien and Evangelina Lorenzana) live next door    Quality of Family Relationships supportive;helpful    Able to Return to Prior Arrangements yes       Resource/Environmental Concerns    Resource/Environmental Concerns none    Transportation Concerns none       Transition Planning    Patient/Family Anticipates Transition to home with family    Patient/Family Anticipated Services at Transition none    Transportation Anticipated family or friend will provide  spouse or son       Discharge Needs Assessment    Equipment Currently Used at Home none;other (see comments)  Pt reports she has 1prong cane, wheeled walker, wheelchair, shower chair, and bsc available for use if needed)    Concerns to be Addressed denies needs/concerns at this time    Anticipated Changes Related to Illness none    Equipment Needed After Discharge none               Discharge Plan     Row Name 08/05/22 1211       Plan    Plan Home with family    Patient/Family in Agreement with Plan yes    Plan Comments Pt reports she is independent with ADLs without the use of assistive devices. Confirms her pcp and pharmacy.Denies problems affording medications. She intends to return home with spouse at [place] and denies dc needs              Continued Care and Services - Admitted Since 8/5/2022    Coordination has not been started for this encounter.       Expected Discharge Date and Time     Expected Discharge Date Expected Discharge Time    Aug 6,  2022          Demographic Summary     Row Name 08/05/22 1208       General Information    Admission Type observation    Arrived From home    Required Notices Provided Observation Status Notice    Referral Source admission list    Preferred Language English       Contact Information    Permission Granted to Share Info With ;family/designee               Functional Status     Row Name 08/05/22 1208       Functional Status    Usual Activity Tolerance good    Current Activity Tolerance good       Functional Status, IADL    Medications independent    Meal Preparation independent    Housekeeping independent    Laundry independent    Shopping independent                   Patient Forms     Row Name 08/05/22 1212       Patient Forms    Important Message from Medicare ([facility]) --  YOUNGBLOOD 8/5/22    Patient Observation Letter Delivered    Delivered to Patient    Method of delivery In person            Mari Cordova RN, [facility]  Office: [phone]  Fax: [phone]  [email or web address]      I met with patient in room wearing PPE: mask and goggles.     Maintained distance greater than six feet and spent </=15 minutes in the room        Mari Cordova RN

## 2022-08-05 NOTE — DISCHARGE SUMMARY
[facility]    Gael Vasquez MD    CHIEF COMPLAINT:     Chest pain    HISTORY OF PRESENT ILLNESS:    [facility]    ED 8/5/22: 76-year-old female presents with chest discomfort.  She states started yesterday.  She had radiation to both arms.  She states she did become sweaty yesterday.  She states the pain seems to come on when she starts to exert herself.  She has had no nausea.  She did not complain of being short of breath.  She reports the pain has been intermittent moderate in nature.  She has had history of stents most recently a couple of years ago.  She also has a watchman.  She is not on any anticoagulation or antiplatelet medication at this time.  Past Medical History:   Diagnosis Date   • Atrial fibrillation (HCC)    • Coronary artery disease    • Dyslipidemia    • Hypertension    • Hypothyroidism    • Lung fibrosis (HCC)    • Lupus (HCC)    • Lupus (HCC)    • Raynaud's disease      Past Surgical History:   Procedure Laterality Date   • ATRIAL APPENDAGE EXCLUSION LEFT WITH TRANSESOPHAGEAL ECHOCARDIOGRAM N/A 8/19/2021    Procedure: Atrial Appendage Occlusion;  Surgeon: Eric Low MD;  Location: [facility] INVASIVE LOCATION;  Service: Cardiovascular;  Laterality: N/A;   • ATRIAL APPENDAGE EXCLUSION LEFT WITH TRANSESOPHAGEAL ECHOCARDIOGRAM N/A 8/19/2021    Procedure: Atrial Appendage Occlusion;  Surgeon: Francisco Garza MD;  Location: [facility] INVASIVE LOCATION;  Service: Cardiovascular;  Laterality: N/A;   • CARDIAC CATHETERIZATION     • CATARACT EXTRACTION     • CHOLECYSTECTOMY     • ENDOSCOPY N/A 8/20/2021    Procedure: ESOPHAGOGASTRODUODENOSCOPY;  Surgeon: Joao Cross MD;  Location: [facility] ENDOSCOPY;  Service: Gastroenterology;  Laterality: N/A;  submucosal ecchymosis 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   • HYSTERECTOMY     • REPLACEMENT TOTAL KNEE BILATERAL       Family History   Problem Relation Age of Onset   • No Known Problems Mother     • Lung disease Father      Social History     Tobacco Use   • Smoking status: Never Smoker   • Smokeless tobacco: Never Used   Vaping Use   • Vaping Use: Never used   Substance Use Topics   • Alcohol use: Not Currently   • Drug use: Never     Medications Prior to Admission   Medication Sig Dispense Refill Last Dose   • albuterol sulfate  (90 Base) MCG/ACT inhaler albuterol sulfate HFA 90 mcg/actuation aerosol inhaler   2 PUFFS EVERY 6 HOURS AS NEEDED FOR SHORTNESS OF BREATH/WHEEZING   Past Week at Unknown time   • bisoprolol (ZEBeta) 10 MG tablet Take 0.5 tablets by mouth Daily. 90 tablet 3 8/4/2022 at Unknown time   • cyanocobalamin 1000 MCG/ML injection Inject 1,000 mcg into the appropriate muscle as directed by prescriber Every 30 (Thirty) Days.  4 Past Month at Unknown time   • Fluticasone-Umeclidin-Vilant (Trelegy Ellipta) 200-62.5-25 MCG/INH inhaler Inhale 1 puff Daily. 1 each 0 8/4/2022 at Unknown time   • folic acid (FOLVITE) 800 MCG tablet Take 800 mcg by mouth Daily. 800mcg daily   8/4/2022 at Unknown time   • hydroxychloroquine (PLAQUENIL) 200 MG tablet Take 200 mg by mouth Daily.   8/4/2022 at Unknown time   • levothyroxine (SYNTHROID, LEVOTHROID) 137 MCG tablet Take 1 tablet by mouth Daily.   8/4/2022 at Unknown time   • mycophenolate (CELLCEPT) 500 MG tablet Take  by mouth 2 (Two) Times a Day.   8/4/2022 at Unknown time   • PARoxetine (PAXIL) 30 MG tablet Take 1 tablet by mouth Daily.   8/4/2022 at Unknown time   • rosuvastatin (CRESTOR) 5 MG tablet Take 5 mg by mouth Daily.   8/4/2022 at Unknown time     Allergies:  Pravastatin and Vancomycin    Immunization History   Administered Date(s) Administered   • Flu Vaccine Quad PF >36MO 10/23/2017   • Fluzone High Dose =>65 Years ([employer] ONLY) 11/13/2012, 10/15/2013, 09/09/2014, 10/09/2015, 10/14/2016, 10/23/2017, 09/25/2018   • Fluzone Split Quad (Multi-dose) 10/23/2017, 09/09/2019   • Pneumococcal Conjugate 13-Valent (PCV13) 10/22/2013   •  Pneumococcal Polysaccharide (PPSV23) 11/26/2019           REVIEW OF SYSTEMS:    Review of Systems   Constitutional: Negative.   HENT: Negative.    Eyes: Negative.    Cardiovascular: Positive for chest pain.   Respiratory: Negative.    Endocrine: Negative.    Hematologic/Lymphatic: Negative.    Skin: Negative.    Musculoskeletal: Negative.    Gastrointestinal: Negative.    Genitourinary: Negative.    Neurological: Negative.    Psychiatric/Behavioral: Negative.    Allergic/Immunologic: Negative.        Vital Signs  Temp:  [97.6 °F (36.4 °C)-97.9 °F (36.6 °C)] 97.6 °F (36.4 °C)  Heart Rate:  [53-78] 63  Resp:  [20] 20  BP: (106-154)/(51-80) 139/80          Physical Exam:  Physical Exam  Vitals and nursing note reviewed.   Constitutional:       Appearance: Normal appearance.   HENT:      Head: Normocephalic and atraumatic.      Right Ear: External ear normal.      Left Ear: External ear normal.      Nose: Nose normal.      Mouth/Throat:      Mouth: Mucous membranes are moist.      Pharynx: Oropharynx is clear.   Eyes:      Extraocular Movements: Extraocular movements intact.      Conjunctiva/sclera: Conjunctivae normal.      Pupils: Pupils are equal, round, and reactive to light.   Cardiovascular:      Rate and Rhythm: Normal rate and regular rhythm.      Pulses: Normal pulses.      Heart sounds: Normal heart sounds.   Pulmonary:      Effort: Pulmonary effort is normal.      Breath sounds: Normal breath sounds.   Abdominal:      General: Bowel sounds are normal.      Palpations: Abdomen is soft.   Musculoskeletal:         General: Normal range of motion.      Cervical back: Normal range of motion.   Skin:     General: Skin is warm.      Capillary Refill: Capillary refill takes less than 2 seconds.   Neurological:      General: No focal deficit present.      Mental Status: She is alert and oriented to person, place, and time. Mental status is at baseline.   Psychiatric:         Mood and Affect: Mood normal.          Behavior: Behavior normal.         Thought Content: Thought content normal.         Judgment: Judgment normal.         Emotional Behavior:    WNL   Debilities:   none  Results Review:    I reviewed the patient's new clinical results.  Lab Results (most recent)     Procedure Component Value Units Date/Time    BNP [[id number]]  (Normal) Collected: 08/05/22 1300    Specimen: Blood Updated: 08/05/22 1508     proBNP 603.8 pg/mL     Narrative:      Among patients with dyspnea, NT-proBNP is highly sensitive for the detection of acute congestive heart failure. In addition NT-proBNP of <300 pg/ml effectively rules out acute congestive heart failure with 99% negative predictive value.    Results may be falsely decreased if patient taking Biotin.      Troponin [[id number]]  (Normal) Collected: 08/05/22 1300    Specimen: Blood Updated: 08/05/22 1335     Troponin T <0.010 ng/mL     Narrative:      Troponin T Reference Range:  <= 0.03 ng/mL-   Negative for AMI  >0.03 ng/mL-     Abnormal for myocardial necrosis.  Clinicians would have to utilize clinical acumen, EKG, Troponin and serial changes to determine if it is an Acute Myocardial Infarction or myocardial injury due to an underlying chronic condition.       Results may be falsely decreased if patient taking Biotin.      COVID PRE-OP / PRE-PROCEDURE SCREENING ORDER (NO ISOLATION) - Swab, Nasopharynx [[id number]]  (Normal) Collected: 08/05/22 1123    Specimen: Swab from Nasopharynx Updated: 08/05/22 1159    Narrative:      The following orders were created for panel order COVID PRE-OP / PRE-PROCEDURE SCREENING ORDER (NO ISOLATION) - Swab, Nasopharynx.  Procedure                               Abnormality         Status                     ---------                               -----------         ------                     COVID-19,CEPHEID/KATHRIN,CO...[[id number]]  Normal              Final result                 Please view results for these tests on the individual orders.     COVID-19,CEPHEID/KATHRIN,COR/AL/PAD/MAE IN-HOUSE(OR EMERGENT/ADD-ON),NP SWAB IN TRANSPORT MEDIA 3-4 HR TAT, RT-PCR - Swab, Nasopharynx [[id number]]  (Normal) Collected: 08/05/22 1123    Specimen: Swab from Nasopharynx Updated: 08/05/22 1159     COVID19 Not Detected    Narrative:      Fact sheet for providers: https://www.fda.gov/media/551772/download     Fact sheet for patients: https://www.fda.gov/media/859736/download  Fact sheet for providers: https://www.fda.gov/media/551691/download    Fact sheet for patients: https://www.[employer].gov/media/029208/download    Test performed by PCR.    Comprehensive Metabolic Panel [[id number]] Collected: 08/05/22 0857    Specimen: Blood Updated: 08/05/22 0922     Glucose 94 mg/dL      BUN 12 mg/dL      Creatinine 0.77 mg/dL      Sodium 141 mmol/L      Potassium 3.9 mmol/L      Chloride 105 mmol/L      CO2 28.0 mmol/L      Calcium 8.8 mg/dL      Total Protein 6.6 g/dL      Albumin 4.20 g/dL      ALT (SGPT) 15 U/L      AST (SGOT) 20 U/L      Alkaline Phosphatase 68 U/L      Total Bilirubin 0.7 mg/dL      Globulin 2.4 gm/dL      A/G Ratio 1.8 g/dL      BUN/Creatinine Ratio 15.6     Anion Gap 8.0 mmol/L      eGFR 79.6 mL/min/1.73      Comment: National Kidney Foundation and American Society of Nephrology (ASN) Task Force recommended calculation based on the Chronic Kidney Disease Epidemiology Collaboration (CKD-EPI) equation refit without adjustment for race.       Narrative:      GFR Normal >60  Chronic Kidney Disease <60  Kidney Failure <15      Troponin [[id number]]  (Normal) Collected: 08/05/22 0857    Specimen: Blood Updated: 08/05/22 0922     Troponin T 0.013 ng/mL     Narrative:      Troponin T Reference Range:  <= 0.03 ng/mL-   Negative for AMI  >0.03 ng/mL-     Abnormal for myocardial necrosis.  Clinicians would have to utilize clinical acumen, EKG, Troponin and serial changes to determine if it is an Acute Myocardial Infarction or myocardial injury due to an underlying chronic  condition.       Results may be falsely decreased if patient taking Biotin.      CBC & Differential [[id number]]  (Abnormal) Collected: 08/05/22 0807    Specimen: Blood Updated: 08/05/22 0848    Narrative:      The following orders were created for panel order CBC & Differential.  Procedure                               Abnormality         Status                     ---------                               -----------         ------                     CBC Auto Differential[[id number]]        Abnormal            Final result               Scan Slide[[id number]]                                                                    Please view results for these tests on the individual orders.    CBC Auto Differential [[id number]]  (Abnormal) Collected: 08/05/22 0843    Specimen: Blood Updated: 08/05/22 0848     WBC 5.70 10*3/mm3      RBC 4.30 10*6/mm3      Hemoglobin 13.2 g/dL      Hematocrit 38.7 %      MCV 90.0 fL      MCH 30.7 pg      MCHC 34.1 g/dL      RDW 14.6 %      RDW-SD 46.4 fl      MPV 10.0 fL      Platelets 91 10*3/mm3      Neutrophil % 75.6 %      Lymphocyte % 13.1 %      Monocyte % 7.9 %      Eosinophil % 3.1 %      Basophil % 0.3 %      Neutrophils, Absolute 4.30 10*3/mm3      Lymphocytes, Absolute 0.80 10*3/mm3      Monocytes, Absolute 0.50 10*3/mm3      Eosinophils, Absolute 0.20 10*3/mm3      Basophils, Absolute 0.00 10*3/mm3      nRBC 0.1 /100 WBC           Imaging Results (Most Recent)     Procedure Component Value Units Date/Time    XR Chest 1 View [[id number]] Collected: 08/05/22 0812     Updated: 08/05/22 0816    Narrative:      DATE OF EXAM:  8/5/2022 7:58 AM     PROCEDURE:  XR CHEST 1 VW-     INDICATIONS:  pain     COMPARISON:  Chest x-ray 8/19/2021, chest CT 1/26/2022     TECHNIQUE:   Single radiographic AP view of the chest was obtained.     FINDINGS:  Heart size borderline. There is antithrombotic device in the left atrial  appendage. Pulmonary vasculature are within normal limits. There are  coarse  interstitial markings throughout both lungs which are not  significantly changed. No new pulmonary abnormality is demonstrated        Impression:      Pulmonary fibrosis with no acute cardiopulmonary process demonstrated     Electronically Signed By-Macario Land On:8/5/2022 8:14 AM  This report was finalized on [id number] by  Macario Land, .        reviewed    ECG/EMG Results (most recent)     Procedure Component Value Units Date/Time    ECG 12 Lead [[id number]] Collected: 08/05/22 0701     Updated: 08/05/22 0702     QT Interval 457 ms     Narrative:      HEART RATE= 62  bpm  RR Interval= 1112  ms  AK Interval= 192  ms  P Horizontal Axis=   deg  P Front Axis= 26  deg  QRSD Interval= 95  ms  QT Interval= 457  ms  QRS Axis= -5  deg  T Wave Axis= 4  deg  - OTHERWISE NORMAL ECG -  Sinus bradycardia  Ventricular premature complex  Low voltage, precordial leads  When compared with ECG of 01-Oct-2021 12:37:52,  No significant change  Electronically Signed By:   Date and Time of Study: 2022-08-05 07:01:50    ECG 12 Lead [[id number]] Collected: 08/05/22 1132     Updated: 08/05/22 1134     QT Interval 471 ms     Narrative:      HEART RATE= 56  bpm  RR Interval= 1064  ms  AK Interval= 173  ms  P Horizontal Axis= 11  deg  P Front Axis= 41  deg  QRSD Interval= 101  ms  QT Interval= 471  ms  QRS Axis= -5  deg  T Wave Axis= 2  deg  - OTHERWISE NORMAL ECG -  Sinus bradycardia  Low voltage, precordial leads  When compared with ECG of 05-Aug-2022 7:01:50,  No significant change  Electronically Signed By:   Date and Time of Study: 2022-08-05 11:32:16        reviewed        Results for orders placed during the hospital encounter of 10/04/21    Adult Transesophageal Echo (TON) W/ Cont if Necessary Per Protocol    Interpretation Summary  TON   procedure note    Procedure:  TON    Indication:   georgette Adams    Date of procedure  : 10/4/2021    :  Dr. Eric Low    Description of procedure:    Under conscious  sedation given by anesthesiology and local anesthesia, a TON probe was advanced into the esophagus and into the stomach 2D, M-mode, color Doppler images were obtained..  Patient tolerated procedure well complications well.    Complications: none    Results:    Normal LV size and contractility LV contractility, EF of 60%  Normal RV size  Biatrial enlargement seen.  Left atrial appendage has watchman left atrial appendage occluder device seated well.  Aortic valve, mitral valve, tricuspid valve appears structurally normal, moderate aortic, mitral, tricuspid seen.  Elevated RV systolic pressures at 58 mmHg consistent with pulmonary hypertension seen  No vegetation seen  No pericardial effusion seen.  Proximal aorta appears normal in size.  Mild aortic plaque seen      Recommendations/plan:    Clinical correlation recommended      Microbiology Results (last 10 days)     Procedure Component Value - Date/Time    COVID PRE-OP / PRE-PROCEDURE SCREENING ORDER (NO ISOLATION) - Swab, Nasopharynx [[id number]]  (Normal) Collected: 08/05/22 1123    Lab Status: Final result Specimen: Swab from Nasopharynx Updated: 08/05/22 1159    Narrative:      The following orders were created for panel order COVID PRE-OP / PRE-PROCEDURE SCREENING ORDER (NO ISOLATION) - Swab, Nasopharynx.  Procedure                               Abnormality         Status                     ---------                               -----------         ------                     COVID-19,CEPHEID/KATHRIN,CO...[[id number]]  Normal              Final result                 Please view results for these tests on the individual orders.    COVID-19,CEPHEID/KATHRIN,COR/AL/PAD/MAE IN-HOUSE(OR EMERGENT/ADD-ON),NP SWAB IN TRANSPORT MEDIA 3-4 HR TAT, RT-PCR - Swab, Nasopharynx [[id number]]  (Normal) Collected: 08/05/22 1123    Lab Status: Final result Specimen: Swab from Nasopharynx Updated: 08/05/22 1159     COVID19 Not Detected    Narrative:      Fact sheet for providers:  https://www.fda.gov/media/223563/download     Fact sheet for patients: https://www.fda.gov/media/444304/download  Fact sheet for providers: https://www.fda.gov/media/303578/download    Fact sheet for patients: https://www.[employer].gov/media/486960/download    Test performed by PCR.          Assessment & Plan     Chest pain     Chest pain  - CXR showed pulmonary fibrosis with no acute cardiopulmonary disease  - EKG sinus bradycardia  - Monitor serial troponin; last troponin   - Continue cardiac monitoring  - Stress test low risk study   - NPO after midnight  - Hold Beta Blocker if stress test  - Continue NLG SL PRN for CP if systolic bp above 90mmHg  - Continue ASA    Congestive heart failure with coronary artery disease  - Continue Zebeta and Crestor     Lupus  -Continue Plaquenil     Generalized anxiety disorder  - Continue Paxil     Hypothyroidism  - Continue Levothyroxine     I discussed the patients findings and my recommendations with patient and family.     Discharge Diagnosis:      Chest pain      Hospital Course  Patient is a 76 y.o. female presented with chest pain.  Stress test showed low risk study.  Cardiology consulted and cleared patient to follow-up in 2 weeks in office.  Patient to continue taking medications as prescribed.  Patient to monitor blood pressure at home.  Patient to call 911 or go to nearest ED if symptoms worsen.  Test recommendations reviewed with family and they agree with treatment plan.    Past Medical History:     Past Medical History:   Diagnosis Date   • Atrial fibrillation (HCC)    • Coronary artery disease    • Dyslipidemia    • Hypertension    • Hypothyroidism    • Lung fibrosis (HCC)    • Lupus (HCC)    • Lupus (HCC)    • Raynaud's disease        Past Surgical History:     Past Surgical History:   Procedure Laterality Date   • ATRIAL APPENDAGE EXCLUSION LEFT WITH TRANSESOPHAGEAL ECHOCARDIOGRAM N/A 8/19/2021    Procedure: Atrial Appendage Occlusion;  Surgeon: Eric Low  MD Andrew;  Location: [facility] CATH INVASIVE LOCATION;  Service: Cardiovascular;  Laterality: N/A;   • ATRIAL APPENDAGE EXCLUSION LEFT WITH TRANSESOPHAGEAL ECHOCARDIOGRAM N/A 8/19/2021    Procedure: Atrial Appendage Occlusion;  Surgeon: Francisco Garza MD;  Location: [facility] CATH INVASIVE LOCATION;  Service: Cardiovascular;  Laterality: N/A;   • CARDIAC CATHETERIZATION     • CATARACT EXTRACTION     • CHOLECYSTECTOMY     • ENDOSCOPY N/A 8/20/2021    Procedure: ESOPHAGOGASTRODUODENOSCOPY;  Surgeon: Joao Cross MD;  Location: [facility] ENDOSCOPY;  Service: Gastroenterology;  Laterality: N/A;  submucosal ecchymosis 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   • HYSTERECTOMY     • REPLACEMENT TOTAL KNEE BILATERAL         Social History:   Social History     Socioeconomic History   • Marital status:    Tobacco Use   • Smoking status: Never Smoker   • Smokeless tobacco: Never Used   Vaping Use   • Vaping Use: Never used   Substance and Sexual Activity   • Alcohol use: Not Currently   • Drug use: Never   • Sexual activity: Defer       Procedures Performed         Consults:   Consults     No orders found from 7/7/2022 to 8/6/2022.          Condition on Discharge:     Stable    Discharge Disposition  Home or Self Care    Discharge Medications     Discharge Medications      Continue These Medications      Instructions Start Date   albuterol sulfate  (90 Base) MCG/ACT inhaler  Commonly known as: PROVENTIL HFA;VENTOLIN HFA;PROAIR HFA   albuterol sulfate HFA 90 mcg/actuation aerosol inhaler   2 PUFFS EVERY 6 HOURS AS NEEDED FOR SHORTNESS OF BREATH/WHEEZING      bisoprolol 10 MG tablet  Commonly known as: ZEBeta   5 mg, Oral, Daily      cyanocobalamin 1000 MCG/ML injection   1,000 mcg, Intramuscular, Every 30 Days      folic acid 800 MCG tablet  Commonly known as: FOLVITE   800 mcg, Oral, Daily, 800mcg daily      hydroxychloroquine 200 MG tablet  Commonly known as: PLAQUENIL   200 mg, Oral, Daily       levothyroxine 137 MCG tablet  Commonly known as: SYNTHROID, LEVOTHROID   137 mcg, Oral, Daily      mycophenolate 500 MG tablet  Commonly known as: CELLCEPT   Oral, 2 Times Daily      PARoxetine 30 MG tablet  Commonly known as: PAXIL   30 mg, Oral, Daily      rosuvastatin 5 MG tablet  Commonly known as: CRESTOR   5 mg, Oral, Daily      Trelegy Ellipta 200-62.5-25 MCG/INH inhaler  Generic drug: Fluticasone-Umeclidin-Vilant   1 puff, Inhalation, Daily - RT             Discharge Diet:   Diet Instructions     Diet: Cardiac      Discharge Diet: Cardiac          Activity at Discharge:   Activity Instructions     Activity as Tolerated      Measure Blood Pressure            Follow-up Appointments  Future Appointments   Date Time Provider Department Center   8/12/2022  7:00 AM  [facility] LAB RO[facility]  [facility]   8/12/2022  8:00 AM AL CT 2  [facility]   8/12/2022 11:00 AM Eric Low MD [facility]   10/4/2022  2:10 PM Becca Chan MD [facility] None     Additional Instructions for the Follow-ups that You Need to Schedule     Discharge Follow-up with PCP   As directed       Currently Documented PCP:    Gael Vasquez MD    PCP Phone Number:    [phone]     Follow Up Details: 7-10 days         Discharge Follow-up with Specified Provider: Dr. Low; 2 Weeks   As directed      To: Dr. Low    Follow Up: 2 Weeks               Test Results Pending at Discharge       Risk for Readmission (LACE) Score: 7 (8/5/2022 12:51 PM)          ERVIN Martinez  08/05/22  16:22 EDT

## 2022-08-05 NOTE — PLAN OF CARE
Goal Outcome Evaluation:  Plan of Care Reviewed With: patient, spouse  Pt presents as a 77 y/o F admitted to [facility] with chest pain. Chest X-ray and stress test were negative.  PMHx: fibromyalgia, lupus, diabetes, heart failure. At baseline, pt lives with spouse and is independent with community mobility.  Per PT Eval, pt is on room air with no pain.  She ambulated 125 feet with CGA of 1 with good balance.  Further PT needs not indicated.  PT will complete orders. PT recommendation is Home.

## 2022-08-05 NOTE — THERAPY EVALUATION
Patient Name: Ally Ivory  : 1945    MRN: [id number]                              Today's Date: 2022       Admit Date: 2022    Visit Dx:     ICD-10-CM ICD-9-CM   1. Precordial pain  R07.2 786.51     Patient Active Problem List   Diagnosis   • Abnormal cardiovascular stress test   • Angina pectoris (HCC)   • Chest pain   • Fibromyalgia   • Cholelithiasis   • Chronic diastolic heart failure (HCC)   • Coronary arteriosclerosis in native artery   • Chronic coronary artery disease   • Depressive disorder   • Dermatitis   • Diabetes mellitus (HCC)   • Difficult or painful urination   • Dizziness   • Dyslipidemia   • Dyspnea on exertion   • Ecchymosis   • Edema   • Fatigue   • Gallbladder stone with nonacute cholecystitis   • Hyperlipidemia   • Hypertension   • Hypothyroidism   • Long term current use of anticoagulant therapy   • Inflammatory arthritis   • Lupus (HCC)   • Sjogren's syndrome (HCC)   • Systemic lupus erythematosus (HCC)   • Aortic insufficiency   • Mitral regurgitation   • Tricuspid insufficiency   • Obesity   • Osteopenia   • Osteoporosis   • Overweight   • Paroxysmal atrial fibrillation (HCC)   • Premature atrial contraction   • Presence of Watchman left atrial appendage closure device   • Psoriasis   • Raynaud's phenomenon   • Sleep apnea   • Status post percutaneous transluminal coronary angioplasty   • Thrombocytopenia (HCC)   • Trochanteric bursitis of left hip   • Vitamin D deficiency   • Odynophagia     Past Medical History:   Diagnosis Date   • Atrial fibrillation (HCC)    • Coronary artery disease    • Dyslipidemia    • Hypertension    • Hypothyroidism    • Lung fibrosis (HCC)    • Lupus (HCC)    • Lupus (HCC)    • Raynaud's disease      Past Surgical History:   Procedure Laterality Date   • ATRIAL APPENDAGE EXCLUSION LEFT WITH TRANSESOPHAGEAL ECHOCARDIOGRAM N/A 2021    Procedure: Atrial Appendage Occlusion;  Surgeon: Eric Low MD;  Location: [facility]  INVASIVE LOCATION;  Service: Cardiovascular;  Laterality: N/A;   • ATRIAL APPENDAGE EXCLUSION LEFT WITH TRANSESOPHAGEAL ECHOCARDIOGRAM N/A 8/19/2021    Procedure: Atrial Appendage Occlusion;  Surgeon: Francisco Garza MD;  Location: [facility] CATH INVASIVE LOCATION;  Service: Cardiovascular;  Laterality: N/A;   • CARDIAC CATHETERIZATION     • CATARACT EXTRACTION     • CHOLECYSTECTOMY     • ENDOSCOPY N/A 8/20/2021    Procedure: ESOPHAGOGASTRODUODENOSCOPY;  Surgeon: Joao Cross MD;  Location: [facility] ENDOSCOPY;  Service: Gastroenterology;  Laterality: N/A;  submucosal ecchymosis 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   • HYSTERECTOMY     • REPLACEMENT TOTAL KNEE BILATERAL        General Information     Row Name 08/05/22 1519          Physical Therapy Time and Intention    Document Type evaluation  -BR     Mode of Treatment individual therapy;physical therapy  -BR     Row Name 08/05/22 1519          General Information    Prior Level of Function independent:;driving;all household mobility  -BR     Row Name 08/05/22 1519          Living Environment    People in Home spouse  -BR     Row Name 08/05/22 1519          Home Main Entrance    Number of Stairs, Main Entrance two  -BR     Row Name 08/05/22 1519          Cognition    Orientation Status (Cognition) oriented x 4  -BR     Row Name 08/05/22 1519          Safety Issues, Functional Mobility    Impairments Affecting Function (Mobility) endurance/activity tolerance  -BR           User Key  (r) = Recorded By, (t) = Taken By, (c) = Cosigned By    Initials Name Provider Type    BR Elaine Sellers PT Physical Therapist               Mobility     Row Name 08/05/22 1520          Bed Mobility    Bed Mobility bed mobility (all) activities  -BR     All Activities, [place] (Bed Mobility) independent  -BR     Row Name 08/05/22 1520          Sit-Stand Transfer    Sit-Stand [place] (Transfers) set up  -BR     Row Name 08/05/22 1520          Gait/Stairs  (Locomotion)    [place] Level (Gait) contact guard  -BR     Distance in Feet (Gait) 125  -BR     Bilateral Gait Deviations heel strike decreased  -BR           User Key  (r) = Recorded By, (t) = Taken By, (c) = Cosigned By    Initials Name Provider Type    Elaine Baker PT Physical Therapist               Obj/Interventions     Row Name 08/05/22 1521          Range of Motion Comprehensive    General Range of Motion bilateral lower extremity ROM WFL  -BR     Row Name 08/05/22 1521          Strength Comprehensive (MMT)    General Manual Muscle Testing (MMT) Assessment no strength deficits identified  -BR     Row Name 08/05/22 1521          Balance    Balance Assessment standing static balance  -BR     Static Standing Balance supervision  -BR     Position/Device Used, Standing Balance unsupported  -BR     Row Name 08/05/22 1521          Sensory Assessment (Somatosensory)    Sensory Assessment (Somatosensory) sensation intact  -BR           User Key  (r) = Recorded By, (t) = Taken By, (c) = Cosigned By    Initials Name Provider Type    Elaine Baker PT Physical Therapist               Goals/Plan    No documentation.                Clinical Impression     [facility] Name 08/05/22 1521          Pain    Pretreatment Pain Rating 0/10 - no pain  -BR     Posttreatment Pain Rating 0/10 - no pain  -BR     Row Name 08/05/22 1527 08/05/22 1521       Plan of Care Review    Plan of Care Reviewed With -- patient;spouse  -BR    Outcome Evaluation Pt presents as a 75 y/o F admitted to [facility] with chest pain. Chest X-ray and stress test were negative.  PMHx: fibromyalgia, lupus, diabetes, heart failure. At baseline, pt lives with spouse and is independent with community mobility.  Per PT Eval, pt is on room air with no pain.  She ambulated 125 feet with CGA of 1 with good balance.  Further PT needs not indicated.  PT will complete orders. PT recommendation is Home.  -BR --    Row Name 08/05/22 1521          Therapy  Assessment/Plan (PT)    Criteria for Skilled Interventions Met (PT) no problems identified which require skilled intervention  -BR     Therapy Frequency (PT) evaluation only  -BR     Row Name 08/05/22 1521          Vital Signs    Pre Systolic BP Rehab 106  -BR     Pre Treatment Diastolic BP 73  -BR     Pretreatment Heart Rate (beats/min) 55  -BR     Pretreatment Resp Rate (breaths/min) 20  -BR     Pre SpO2 (%) 98  -BR     O2 Delivery Pre Treatment room air  -BR     Row Name 08/05/22 1521          Positioning and Restraints    Pre-Treatment Position in bed  -BR     Post Treatment Position bed  -BR     In Bed notified nsg;call light within reach;side rails up x2  -BR           User Key  (r) = Recorded By, (t) = Taken By, (c) = Cosigned By    Initials Name Provider Type    Elaine Baker, DEYANIRA Physical Therapist               Outcome Measures     Row Name 08/05/22 1523 08/05/22 1300       How much help from another person do you currently need...    Turning from your back to your side while in flat bed without using bedrails? 4  -BR 4  -SL    Moving from lying on back to sitting on the side of a flat bed without bedrails? 4  -BR 4  -SL    Moving to and from a bed to a chair (including a wheelchair)? 4  -BR 4  -SL    Standing up from a chair using your arms (e.g., wheelchair, bedside chair)? 4  -BR 4  -SL    Climbing 3-5 steps with a railing? 3  -BR 3  -SL    To walk in hospital room? 4  -BR 3  -SL    AM-PAC 6 Clicks Score (PT) 23  -BR 22  -SL    Highest level of mobility 7 --> Walked 25 feet or more  -BR 7 --> Walked 25 feet or more  -SL    Row Name 08/05/22 1523          Functional Assessment    Outcome Measure Options AM-PAC 6 Clicks Basic Mobility (PT)  -BR           User Key  (r) = Recorded By, (t) = Taken By, (c) = Cosigned By    Initials Name Provider Type    Romina Dominguez RN Registered Nurse    Elaine Baker PT Physical Therapist                             Physical Therapy Education                  Title: PT OT SLP Therapies (In Progress)     Topic: Physical Therapy (In Progress)     Point: Mobility training (Done)     Learning Progress Summary           Patient [place], E,TB,D, VU,DU by BR at 8/5/2022 1523                   Point: Home exercise program (Not Started)     Learner Progress:  Not documented in this visit.          Point: Body mechanics (Done)     Learning Progress Summary           Patient [place]er, E,TB,D, VU,DU by BR at 8/5/2022 1523                   Point: Precautions (Done)     Learning Progress Summary           Patient [place], E,TB,D, VU,DU by BR at 8/5/2022 1523                               User Key     Initials Effective Dates Name Provider Type Discipline     02/01/22 -  Elaine Sellers PT Physical Therapist PT              PT Recommendation and Plan     Plan of Care Reviewed With: patient, spouse  Outcome Evaluation: Pt presents as a 75 y/o F admitted to [facility] with chest pain. Chest X-ray and stress test were negative.  PMHx: fibromyalgia, lupus, diabetes, heart failure. At baseline, pt lives with spouse and is independent with community mobility.  Per PT Eval, pt is on room air with no pain.  She ambulated 125 feet with CGA of 1 with good balance.  Further PT needs not indicated.  PT will complete orders. PT recommendation is Home.     Time Calculation:    PT Charges     Row Name 08/05/22 1524             Time Calculation    Start Time 1500  -BR      Stop Time 1524  -BR      Time Calculation (min) 24 min  -BR      PT Received On 08/05/22  -BR            User Key  (r) = Recorded By, (t) = Taken By, (c) = Cosigned By    Initials Name Provider Type    BR Elaine Sellers PT Physical Therapist              Therapy Charges for Today     Code Description Service Date Service Provider Modifiers Qty    [id number]  PT EVAL LOW COMPLEXITY 3 8/5/2022 Elaine Sellers PT GP 1          PT G-Codes  Outcome Measure Options: AM-PAC 6 Clicks Basic Mobility (PT)  AM-PAC 6 Clicks Score (PT):  23    Elaine Sellers, PT  8/5/2022

## 2022-08-05 NOTE — ED PROVIDER NOTES
Subjective   History of Present Illness   76-year-old female presents with chest discomfort.  She states started yesterday.  She had radiation to both arms.  She states she did become sweaty yesterday.  She states the pain seems to come on when she starts to exert herself.  She has had no nausea.  She did not complain of being short of breath.  She reports the pain has been intermittent moderate in nature.  She has had history of stents most recently a couple of years ago.  She also has a watchman.  She is not on any anticoagulation or antiplatelet medication at this time.    Review of Systems   Constitutional: Positive for diaphoresis. Negative for fever and unexpected weight change.   HENT: Negative for congestion and sore throat.    Eyes: Negative for pain.   Respiratory: Negative for cough and shortness of breath.         No complaints of new cough or shortness of breath   Cardiovascular: Positive for chest pain. Negative for leg swelling.   Gastrointestinal: Negative for abdominal pain, diarrhea and vomiting.   Genitourinary: Negative for dysuria and urgency.   Musculoskeletal: Negative for back pain.        Pain to both arms with chest discomfort   Skin: Negative for rash.   Neurological: Negative for dizziness, weakness and headaches.   Psychiatric/Behavioral: Negative for confusion.       Past Medical History:   Diagnosis Date   • Atrial fibrillation (HCC)    • Coronary artery disease    • Dyslipidemia    • Hypertension    • Hypothyroidism    • Lung fibrosis (HCC)    • Lupus (HCC)    • Lupus (HCC)    • Raynaud's disease        Allergies   Allergen Reactions   • Pravastatin Rash     rash     • Vancomycin Hives       Past Surgical History:   Procedure Laterality Date   • ATRIAL APPENDAGE EXCLUSION LEFT WITH TRANSESOPHAGEAL ECHOCARDIOGRAM N/A 8/19/2021    Procedure: Atrial Appendage Occlusion;  Surgeon: Eric Low MD;  Location: [facility] INVASIVE LOCATION;  Service: Cardiovascular;   Laterality: N/A;   • ATRIAL APPENDAGE EXCLUSION LEFT WITH TRANSESOPHAGEAL ECHOCARDIOGRAM N/A 8/19/2021    Procedure: Atrial Appendage Occlusion;  Surgeon: Francisco Garza MD;  Location: [facility] CATH INVASIVE LOCATION;  Service: Cardiovascular;  Laterality: N/A;   • CARDIAC CATHETERIZATION     • CATARACT EXTRACTION     • CHOLECYSTECTOMY     • ENDOSCOPY N/A 8/20/2021    Procedure: ESOPHAGOGASTRODUODENOSCOPY;  Surgeon: Joao Cross MD;  Location: [facility] ENDOSCOPY;  Service: Gastroenterology;  Laterality: N/A;  submucosal ecchymosis 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   • HYSTERECTOMY     • REPLACEMENT TOTAL KNEE BILATERAL         Family History   Problem Relation Age of Onset   • No Known Problems Mother    • Lung disease Father        Social History     Socioeconomic History   • Marital status:    Tobacco Use   • Smoking status: Never Smoker   • Smokeless tobacco: Never Used   Vaping Use   • Vaping Use: Never used   Substance and Sexual Activity   • Alcohol use: Not Currently   • Drug use: Never   • Sexual activity: Defer     Prior to Admission medications    Medication Sig Start Date End Date Taking? Authorizing Provider   albuterol sulfate  (90 Base) MCG/ACT inhaler albuterol sulfate HFA 90 mcg/actuation aerosol inhaler   2 PUFFS EVERY 6 HOURS AS NEEDED FOR SHORTNESS OF BREATH/WHEEZING    Sheila Caputo MD   bisoprolol (ZEBeta) 10 MG tablet Take 0.5 tablets by mouth Daily. 8/2/22   Eric Low MD   cyanocobalamin 1000 MCG/ML injection Inject 1,000 mcg into the appropriate muscle as directed by prescriber Every 30 (Thirty) Days. 8/11/19   ProviderSheila MD   Fluticasone-Umeclidin-Vilant (Trelegy Ellipta) 200-62.5-25 MCG/INH inhaler Inhale 1 puff Daily. 3/15/22   Becca Chan MD   Fluticasone-Umeclidin-Vilant (TRELEGY) 100-62.5-25 MCG/INH inhaler Inhale 1 puff Daily. 3/15/22   Becca Chan MD   folic acid (FOLVITE) 800 MCG tablet Take 800 mcg by  mouth Daily. 800mcg daily 4/26/16   Sheila Caputo MD   hydroxychloroquine (PLAQUENIL) 200 MG tablet Take 200 mg by mouth Daily. 6/27/18   Sheila Caputo MD   levothyroxine (SYNTHROID, LEVOTHROID) 137 MCG tablet Take 1 tablet by mouth Daily. 8/22/21   Maggy Sanchez APRN   mycophenolate (CELLCEPT) 500 MG tablet Take  by mouth 2 (Two) Times a Day.    Sheila Caputo MD   PARoxetine (PAXIL) 30 MG tablet Take 1 tablet by mouth Daily. 8/23/21   Maggy Sanchez APRN   rosuvastatin (CRESTOR) 5 MG tablet Take 5 mg by mouth Daily. 4/28/15   Sheila Caputo MD             Objective   Physical Exam  76-year-old female awake alert.  Generally well-developed well-nourished.  Pupils equal round react light.  Neck supple.  Chest occasional scattered fine crackles with equal breath sounds.  Cardiovascular regular rate and rhythm.  Abdomen soft nontender.  Extremities without tenderness or edema.  Skin without rash noted.  Procedures           ED Course      Results for orders placed or performed during the hospital encounter of 08/05/22   COVID-19,CEPHEID/KATHRIN,COR/AL/PAD/MAE IN-HOUSE(OR EMERGENT/ADD-ON),NP SWAB IN TRANSPORT MEDIA 3-4 HR TAT, RT-PCR - Swab, Nasopharynx    Specimen: Nasopharynx; Swab   Result Value Ref Range    COVID19 Not Detected Not Detected - Ref. Range   Comprehensive Metabolic Panel    Specimen: Blood   Result Value Ref Range    Glucose 94 65 - 99 mg/dL    BUN 12 8 - 23 mg/dL    Creatinine 0.77 0.57 - 1.00 mg/dL    Sodium 141 136 - 145 mmol/L    Potassium 3.9 3.5 - 5.2 mmol/L    Chloride 105 98 - 107 mmol/L    CO2 28.0 22.0 - 29.0 mmol/L    Calcium 8.8 8.6 - 10.5 mg/dL    Total Protein 6.6 6.0 - 8.5 g/dL    Albumin 4.20 3.50 - 5.20 g/dL    ALT (SGPT) 15 1 - 33 U/L    AST (SGOT) 20 1 - 32 U/L    Alkaline Phosphatase 68 39 - 117 U/L    Total Bilirubin 0.7 0.0 - 1.2 mg/dL    Globulin 2.4 gm/dL    A/G Ratio 1.8 g/dL    BUN/Creatinine Ratio 15.6 7.0 - 25.0    Anion Gap 8.0  5.0 - 15.0 mmol/L    eGFR 79.6 >60.0 mL/min/1.73   Troponin    Specimen: Blood   Result Value Ref Range    Troponin T 0.013 0.000 - 0.030 ng/mL   CBC Auto Differential    Specimen: Blood   Result Value Ref Range    WBC 5.70 3.40 - 10.80 10*3/mm3    RBC 4.30 3.77 - 5.28 10*6/mm3    Hemoglobin 13.2 12.0 - 15.9 g/dL    Hematocrit 38.7 34.0 - 46.6 %    MCV 90.0 79.0 - 97.0 fL    MCH 30.7 26.6 - 33.0 pg    MCHC 34.1 31.5 - 35.7 g/dL    RDW 14.6 12.3 - 15.4 %    RDW-SD 46.4 37.0 - 54.0 fl    MPV 10.0 6.0 - 12.0 fL    Platelets 91 (L) 140 - 450 10*3/mm3    Neutrophil % 75.6 42.7 - 76.0 %    Lymphocyte % 13.1 (L) 19.6 - 45.3 %    Monocyte % 7.9 5.0 - 12.0 %    Eosinophil % 3.1 0.3 - 6.2 %    Basophil % 0.3 0.0 - 1.5 %    Neutrophils, Absolute 4.30 1.70 - 7.00 10*3/mm3    Lymphocytes, Absolute 0.80 0.70 - 3.10 10*3/mm3    Monocytes, Absolute 0.50 0.10 - 0.90 10*3/mm3    Eosinophils, Absolute 0.20 0.00 - 0.40 10*3/mm3    Basophils, Absolute 0.00 0.00 - 0.20 10*3/mm3    nRBC 0.1 0.0 - 0.2 /100 WBC   Troponin    Specimen: Blood   Result Value Ref Range    Troponin T <0.010 0.000 - 0.030 ng/mL   BNP    Specimen: Blood   Result Value Ref Range    proBNP 603.8 0.0 - 1,800.0 pg/mL   Stress Test With Myocardial Perfusion One Day   Result Value Ref Range    Target HR (85%) 122 bpm    Max. Pred. HR (100%) 143 bpm    BH CV STRESS PROTOCOL 1 Pharmacologic     Stage 1 1     HR Stage 1 63     BP Stage 1 124/64     Duration Min Stage 1 0     Duration Sec Stage 1 10     Stress Dose Regadenoson Stage 1 0.4     Stress Comments Stage 1 10 sec bolus injection     Baseline HR 57 bpm    Baseline /56 mmHg    Peak HR 81 bpm    Percent Max Pred HR 56.64 %    Percent Target HR 67 %    Peak /56 mmHg    Recovery HR 66 bpm    Recovery /65 mmHg    Nuclear Prior Study 3     BH CV REST NUCLEAR ISOTOPE DOSE 10.0 mCi    BH CV STRESS NUCLEAR ISOTOPE DOSE 32.5 mCi    Nuc Stress EF 66 %   ECG 12 Lead   Result Value Ref Range    QT Interval 457  "ms   ECG 12 Lead   Result Value Ref Range    QT Interval 471 ms     XR Chest 1 View    Result Date: 8/5/2022  Pulmonary fibrosis with no acute cardiopulmonary process demonstrated  Electronically Signed By-Macario Land On:8/5/2022 8:14 AM This report was finalized on [id number] by  Macario Land, .    Medications   aspirin chewable tablet 324 mg (324 mg Oral Given 8/5/22 0815)   technetium tetrofosmin (Tc-MYOVIEW) injection 1 dose (1 dose Intravenous Given 8/5/22 1306)   regadenoson (LEXISCAN) injection 0.4 mg (0.4 mg Intravenous Given 8/5/22 1400)   technetium tetrofosmin (Tc-MYOVIEW) injection 1 dose (1 dose Intravenous Given 8/5/22 1400)     /80 (BP Location: Left arm, Patient Position: Lying)   Pulse 63   Temp 97.6 °F (36.4 °C) (Oral)   Resp 20   Ht 167.6 cm (66\")   Wt 92.6 kg (204 lb 2.3 oz)   SpO2 95%   BMI 32.95 kg/m²                                        MDM  Chart review: Patient had cardiology evaluation on the second of this month for autonomic orthostatic hypotension paroxysmal atrial fibrillation hypertension nonrheumatic mitral valve regurgitation.  And pulmonary hypertension  Comorbidity: As per past history  Differential: Reflux, musculoskeletal pain, unstable angina,  My EKG interpretation: Sinus rhythm PVC low voltage precordial leads.  Compared to previous PVC now present  ab: CBC remarkable for platelet count of 91,000.  Comprehensive metabolic panel normal troponin 0.013  Radiology: I reviewed chest x-ray.  Heart size borderline.  There is a antithrombotic device in the left atrial appendage.  There are some coarse interstitial markings throughout both lungs not significantly changed from previous.  Discussion/treatment: Patient was given aspirin.  She is currently pain-free.  Findings were discussed with her.  She will placed in observation for additional cardiac evaluation.  Patient was evaluated using appropriate PPE      Final diagnoses:   Precordial pain       ED " Disposition  ED Disposition     ED Disposition   Decision to Admit    Condition   --    Comment   --             Gael Vasquez MD  [address] [place] [id number] [phone]      7-10 days    Eric Low MD  [address] [facility] [place] [address] [phone]    Schedule an appointment as soon as possible for a visit in 2 week(s)           Medication List      New Prescriptions    nitroglycerin 0.4 MG SL tablet  Commonly known as: Nitrostat  Place 1 tablet under the tongue Every 5 (Five) Minutes As Needed for Chest Pain. Take no more than 3 doses in 15 minutes.           Where to Get Your Medications      These medications were sent to [facility]/pharmacy #[id number] - [place], [place] - [address] [facility] AT [address] [phone]  - [phone] [address] [place] [address]    Phone: [phone]   · nitroglycerin 0.4 MG SL tablet          Barrera Arreola MD  08/06/22 9838

## 2022-08-07 LAB — QT INTERVAL: 457 MS

## 2022-08-08 NOTE — CASE MANAGEMENT/SOCIAL WORK
Case Management Discharge Note                Selected Continued Care - Discharged on 8/5/2022 Admission date: 8/5/2022 - Discharge disposition: Home or Self Care                            Final Discharge Disposition Code: 01 - home or self-care

## 2022-08-10 ENCOUNTER — LAB (OUTPATIENT)
Dept: LAB | Facility: HOSPITAL | Age: 77
End: 2022-08-10

## 2022-08-10 DIAGNOSIS — Z01.812 PRE-PROCEDURE LAB EXAM: ICD-10-CM

## 2022-08-10 LAB — SARS-COV-2 ORF1AB RESP QL NAA+PROBE: NOT DETECTED

## 2022-08-10 PROCEDURE — U0005 INFEC AGEN DETEC AMPLI PROBE: HCPCS

## 2022-08-10 PROCEDURE — C9803 HOPD COVID-19 SPEC COLLECT: HCPCS

## 2022-08-10 PROCEDURE — U0004 COV-19 TEST NON-CDC HGH THRU: HCPCS

## 2022-08-12 ENCOUNTER — HOSPITAL ENCOUNTER (OUTPATIENT)
Dept: CT IMAGING | Facility: HOSPITAL | Age: 77
Discharge: HOME | End: 2022-08-12

## 2022-08-12 ENCOUNTER — HOSPITAL ENCOUNTER (OUTPATIENT)
Dept: RESPIRATORY THERAPY | Facility: HOSPITAL | Age: 77
Discharge: HOME | End: 2022-08-12

## 2022-08-12 ENCOUNTER — OFFICE VISIT (OUTPATIENT)
Dept: CARDIOLOGY | Facility: CLINIC | Age: 77
End: 2022-08-12

## 2022-08-12 VITALS
BODY MASS INDEX: 33.59 KG/M2 | OXYGEN SATURATION: 98 % | WEIGHT: 209 LBS | HEART RATE: 52 BPM | HEIGHT: 66 IN | DIASTOLIC BLOOD PRESSURE: 40 MMHG | SYSTOLIC BLOOD PRESSURE: 109 MMHG

## 2022-08-12 VITALS — RESPIRATION RATE: 12 BRPM | OXYGEN SATURATION: 96 % | HEART RATE: 50 BPM

## 2022-08-12 DIAGNOSIS — J84.9 ILD (INTERSTITIAL LUNG DISEASE): ICD-10-CM

## 2022-08-12 DIAGNOSIS — I25.118 CORONARY ARTERY DISEASE OF NATIVE ARTERY OF NATIVE HEART WITH STABLE ANGINA PECTORIS: Primary | ICD-10-CM

## 2022-08-12 DIAGNOSIS — E66.9 OBESITY (BMI 30-39.9): ICD-10-CM

## 2022-08-12 DIAGNOSIS — R06.09 DYSPNEA ON EXERTION: ICD-10-CM

## 2022-08-12 DIAGNOSIS — Z95.818 PRESENCE OF WATCHMAN LEFT ATRIAL APPENDAGE CLOSURE DEVICE: ICD-10-CM

## 2022-08-12 DIAGNOSIS — I34.0 NONRHEUMATIC MITRAL VALVE REGURGITATION: ICD-10-CM

## 2022-08-12 DIAGNOSIS — I95.1 AUTONOMIC ORTHOSTATIC HYPOTENSION: ICD-10-CM

## 2022-08-12 DIAGNOSIS — I48.0 PAROXYSMAL ATRIAL FIBRILLATION: ICD-10-CM

## 2022-08-12 DIAGNOSIS — I27.20 PULMONARY HYPERTENSION: ICD-10-CM

## 2022-08-12 DIAGNOSIS — E78.5 DYSLIPIDEMIA: ICD-10-CM

## 2022-08-12 DIAGNOSIS — I10 ESSENTIAL HYPERTENSION: ICD-10-CM

## 2022-08-12 PROCEDURE — 94060 EVALUATION OF WHEEZING: CPT

## 2022-08-12 PROCEDURE — 94726 PLETHYSMOGRAPHY LUNG VOLUMES: CPT

## 2022-08-12 PROCEDURE — 94729 DIFFUSING CAPACITY: CPT

## 2022-08-12 PROCEDURE — 99214 OFFICE O/P EST MOD 30 MIN: CPT | Performed by: INTERNAL MEDICINE

## 2022-08-12 PROCEDURE — 71250 CT THORAX DX C-: CPT

## 2022-08-12 RX ORDER — ALBUTEROL SULFATE 90 UG/1
2 AEROSOL, METERED RESPIRATORY (INHALATION) ONCE
Status: COMPLETED | OUTPATIENT
Start: 2022-08-12 | End: 2022-08-12

## 2022-08-12 RX ORDER — ISOSORBIDE MONONITRATE 30 MG/1
30 TABLET, EXTENDED RELEASE ORAL EVERY MORNING
Qty: 30 TABLET | Refills: 11 | Status: SHIPPED | OUTPATIENT
Start: 2022-08-12

## 2022-08-12 RX ADMIN — ALBUTEROL SULFATE 2 PUFF: 108 INHALANT RESPIRATORY (INHALATION) at 07:27

## 2022-08-12 NOTE — PROGRESS NOTES
Subjective:     Encounter Date:08/12/2022      Patient ID: Ally Ivory is a 76 y.o. female.    Chief Complaint : Hospital follow-up for chest pain.  Follow-up for CAD, PCI, A. fib, watchman    History of Present Illness           Ms. Ally Ivory has PMH of     #.CAD, JUAN , cath 5/9/18 Severe disease in OM1 branch of LCX,FFR RCA 0.97  Patent stent in ostial right with moderate InStent stenosis and noncritical FFR at 0.90  Elevated  LVEDP with normal LV systolic function. cath  01/21/2019 PTCA to instent  RCA.  Repeat stent to RCA in [place] in 2019  #. CAROLYN-TACHY SYNDROME with  P. A.FIB  , long-term anticoagulation, s/p watchman 8/19/2021, post watchman 1 year TON 10/4/21 revealed a PA systolic pressure 58 mmHg  #.  Moderate aortic regurgitation and mitral regurgitation  #   dyslipidemia,statin allergy  #  hypertension, positive family history, obesity, hypothyroidism.  #  history of lupus, thrombocytopenia, pernicious anemia. psoriasis, Raynaud's, Sjogren's, RA  #.  Interstitial lung disease, PFTs 12/2021 FVC 55%-improved to 69 after bronchodilators, FEV1 54% improved to 76%, TLC 77%, DLCO 47%-/10 Becca Chan MD  #.  Pulmonary hypertension, TON 10/2021 elevated RV systolic pressure 58 mmHg  #   cholecystectomy  #.  moderate MR and -moderate AI, last echo 20 60  #  type 2 diabetes. (Patient says she is not diabetic.)        Here for   followup.  Patient has been feeling poorly since CellCept was started by rheumatology..  Was severely orthostatic and medications were adjusted presented to the hospital 8/5/2022 with chest pain.  Underwent Lexiscan Cardiolite 8/5/2022 which was low risk abnormal.  Is here for follow-up.  Patient denies any chest pain has shortness of breath fatigue palpitations lightheadedness dizziness and edema.     Patient's arterial blood pressure is   109/40, heart rate 52, O2 sat of 98% on room air.  Patient's BMI is over 30.     Patient  had labs done 08/20/2018 with  rheumatology CMP was unremarkable.  Labs done 11/22/2019 revealed normal CMP, CK, cholesterol of 191, HDL 40  triglycerides 148, Labs from 11/15/2020 reveal cholesterol 95 triglycerides 90 HDL 41 LDL 36 CMP and Ck unremarkable.  Labs from 6/3/2021 revealed normal CMP, CBC with a platelet count of 114.  Labs 8/19/2021 revealed normal CBC with platelet count of 77 and Chem-7 with low calcium. Labs from 10/1/2021 revealed normal Chem-7 and CBC  CT chest with contrast done 1/26/2022 revealed symmetric groundglass opacities seen throughout all lung fields likely represent pulmonary edema or other differential include infectious and inflammatory etiologies clinical correlation recommended.  Patient had splenomegaly.  Labs from 8/5/2022 revealed normal CMP, troponin x3 and proBNP at 603.  CBC with a platelet count of 91.         ASSESSMENT:     -Orthostatic hypotension  -Abnormal CT chest  -Fatigue, dyspnea on exertion  -Obesity BMI over 36  -Pulmonary hypertension  #Atrial fibrillation, status post watchman  #Thrombocytopenia  #Sinus node dysfunction with junctional bradycardia  #Dyslipidemia  # . chronic CHF due to diastolic dysfunction with normal LV systolic function in the past  #.   CAD ,PCI  #.  hypertension, dyslipidemia  #.   ambrocio-tachy syndrome , s/p ILR  #  PVC  #.  moderate MR and-moderate AI      PLAN:     Patient was recently seen in the office with orthostatic hypotension and a calcium channel blockers and long-acting nitrates were stopped and was started on low-dose beta-blockers and continued on Crestor.  Patient started having pain.  We will restart low-dose isosorbide mononitrate at 30 mg since patient's orthostasis is better.  Patient is thrombocytopenic we will hold off on aspirin.  Continue medical management with bisoprolol, rosuvastatin to help with A. fib, CHF, CAD, PCI, hypertension, dyslipidemia as tolerated.  Patient's [id number] over is 5,due to female gender, age over 74,  hypertension, congestive heart failure ,will benefit from long-term anticoagulation.  But patient has thrombocytopenia which makes her a poor candidate for long-term anticoagulation.  Therefore underwent watchman.     Patient has previous history of bradycardia and sinus node dysfunction we will continue symptoms and monitor rhythm closely.    Advised patient to check heart rate and blood pressure now that she is being started on low-dose beta-blockers.  Patient's BMI is over 30, counseled on weight loss diet and exercise.  Follow-up with pulmonary and rheumatology.  Advised her to wear knee-high stockings.            Procedures Lexiscan Cardiolite performed 8/5/2022 reveals  EF of 66% with small sized infarct in apex.  EKG done 8/5/2022 reviewed/interpreted by me reveals sinus bradycardia at the rate of 56 bpm    The following portions of the patient's history were reviewed and updated as appropriate: allergies, current medications, past family history, past medical history, past social history, past surgical history and problem list.    Assessment:         MDM     Diagnosis Plan   1. Coronary artery disease of native artery of native heart with stable angina pectoris (HCC)     2. Autonomic orthostatic hypotension     3. Paroxysmal atrial fibrillation (HCC)     4. Essential hypertension     5. Obesity (BMI 30-39.9)     6. Nonrheumatic mitral valve regurgitation     7. Pulmonary hypertension (HCC)     8. Dyslipidemia     9. Presence of Watchman left atrial appendage closure device            Plan:               Past Medical History:  Past Medical History:   Diagnosis Date   • Atrial fibrillation (HCC)    • Coronary artery disease    • Dyslipidemia    • Hypertension    • Hypothyroidism    • Lung fibrosis (HCC)    • Lupus (HCC)    • Lupus (HCC)    • Raynaud's disease      Past Surgical History:  Past Surgical History:   Procedure Laterality Date   • ATRIAL APPENDAGE EXCLUSION LEFT WITH TRANSESOPHAGEAL ECHOCARDIOGRAM  N/A 8/19/2021    Procedure: Atrial Appendage Occlusion;  Surgeon: Eric Low MD;  Location: [facility] CATH INVASIVE LOCATION;  Service: Cardiovascular;  Laterality: N/A;   • ATRIAL APPENDAGE EXCLUSION LEFT WITH TRANSESOPHAGEAL ECHOCARDIOGRAM N/A 8/19/2021    Procedure: Atrial Appendage Occlusion;  Surgeon: Francisco Garza MD;  Location: [facility] CATH INVASIVE LOCATION;  Service: Cardiovascular;  Laterality: N/A;   • CARDIAC CATHETERIZATION     • CATARACT EXTRACTION     • CHOLECYSTECTOMY     • ENDOSCOPY N/A 8/20/2021    Procedure: ESOPHAGOGASTRODUODENOSCOPY;  Surgeon: Joao Cross MD;  Location: [facility] ENDOSCOPY;  Service: Gastroenterology;  Laterality: N/A;  submucosal ecchymosis 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   • HYSTERECTOMY     • REPLACEMENT TOTAL KNEE BILATERAL        Allergies:  Allergies   Allergen Reactions   • Pravastatin Rash     rash     • Vancomycin Hives     Home Meds:  Current Meds:     Current Outpatient Medications:   •  albuterol sulfate  (90 Base) MCG/ACT inhaler, albuterol sulfate HFA 90 mcg/actuation aerosol inhaler  2 PUFFS EVERY 6 HOURS AS NEEDED FOR SHORTNESS OF BREATH/WHEEZING, Disp: , Rfl:   •  bisoprolol (ZEBeta) 10 MG tablet, Take 0.5 tablets by mouth Daily., Disp: 90 tablet, Rfl: 3  •  cyanocobalamin 1000 MCG/ML injection, Inject 1,000 mcg into the appropriate muscle as directed by prescriber Every 30 (Thirty) Days., Disp: , Rfl: 4  •  Fluticasone-Umeclidin-Vilant (Trelegy Ellipta) 200-62.5-25 MCG/INH inhaler, Inhale 1 puff Daily., Disp: 1 each, Rfl: 0  •  folic acid (FOLVITE) 800 MCG tablet, Take 800 mcg by mouth Daily. 800mcg daily, Disp: , Rfl:   •  hydroxychloroquine (PLAQUENIL) 200 MG tablet, Take 200 mg by mouth Daily., Disp: , Rfl:   •  levothyroxine (SYNTHROID, LEVOTHROID) 137 MCG tablet, Take 1 tablet by mouth Daily., Disp: , Rfl:   •  mycophenolate (CELLCEPT) 500 MG tablet, Take  by mouth 2 (Two) Times a Day., Disp: , Rfl:   •   "nitroglycerin (Nitrostat) 0.4 MG SL tablet, Place 1 tablet under the tongue Every 5 (Five) Minutes As Needed for Chest Pain. Take no more than 3 doses in 15 minutes., Disp: 30 tablet, Rfl: 12  •  PARoxetine (PAXIL) 30 MG tablet, Take 1 tablet by mouth Daily., Disp: , Rfl:   •  rosuvastatin (CRESTOR) 5 MG tablet, Take 5 mg by mouth Daily., Disp: , Rfl:   •  isosorbide mononitrate (IMDUR) 30 MG 24 hr tablet, Take 1 tablet by mouth Every Morning., Disp: 30 tablet, Rfl: 11  No current facility-administered medications for this visit.  Social History:   Social History     Tobacco Use   • Smoking status: Never Smoker   • Smokeless tobacco: Never Used   Substance Use Topics   • Alcohol use: Not Currently      Family History:  Family History   Problem Relation Age of Onset   • No Known Problems Mother    • Lung disease Father               Review of Systems   Constitutional: Positive for malaise/fatigue.   Cardiovascular: Positive for leg swelling. Negative for chest pain and palpitations.   Respiratory: Positive for shortness of breath.    Skin: Negative for rash.   Neurological: Positive for dizziness and light-headedness. Negative for numbness.     All other systems are negative         Objective:     Physical Exam  /40   Pulse 52   Ht 167.6 cm (66\")   Wt 94.8 kg (209 lb)   SpO2 98%   BMI 33.73 kg/m²   General:  Appears in no acute distress  Eyes: Sclera is anicteric,  conjunctiva is clear   HEENT:  No JVD.  No carotid bruits  Respiratory: Respirations regular and unlabored at rest.  Clear to auscultation  Cardiovascular: S1,S2 Regular rate and rhythm. No murmur, rub or gallop auscultated.   Extremities: No digital clubbing or cyanosis, no edema  Skin: Color pink. Skin warm and dry to touch. No rashes  No xanthoma  Neuro: Alert and awake.    Lab Reviewed:         Eric Low MD  8/12/2022 11:33 EDT      EMR Dragon/Transcription:   \"Dictated utilizing Dragon dictation\".        "

## 2022-08-23 ENCOUNTER — TELEPHONE (OUTPATIENT)
Dept: PULMONOLOGY | Facility: HOSPITAL | Age: 77
End: 2022-08-23

## 2022-08-23 NOTE — TELEPHONE ENCOUNTER
Per Dr. Chan her CT of chest is stable and the PFT she has a mild decrease in lung volume from 77% to 73%. F/U as scheduled in Oct 2022. S/W pt and she also stated that she had went to [facility] for fluid on her lungs and is currently on 2L of supplemental oxygen until she sees her PCP.

## 2022-10-04 ENCOUNTER — HOSPITAL ENCOUNTER (OUTPATIENT)
Dept: RESPIRATORY THERAPY | Facility: HOSPITAL | Age: 77
Discharge: HOME | End: 2022-10-04

## 2022-10-04 ENCOUNTER — OFFICE VISIT (OUTPATIENT)
Dept: PULMONOLOGY | Facility: HOSPITAL | Age: 77
End: 2022-10-04

## 2022-10-04 VITALS
WEIGHT: 202 LBS | HEART RATE: 59 BPM | BODY MASS INDEX: 32.47 KG/M2 | SYSTOLIC BLOOD PRESSURE: 138 MMHG | OXYGEN SATURATION: 95 % | DIASTOLIC BLOOD PRESSURE: 79 MMHG | RESPIRATION RATE: 14 BRPM | HEIGHT: 66 IN

## 2022-10-04 DIAGNOSIS — R09.02 EXERCISE HYPOXEMIA: ICD-10-CM

## 2022-10-04 DIAGNOSIS — J84.9 ILD (INTERSTITIAL LUNG DISEASE): Primary | ICD-10-CM

## 2022-10-04 DIAGNOSIS — G47.19 EXCESSIVE DAYTIME SLEEPINESS: ICD-10-CM

## 2022-10-04 DIAGNOSIS — M32.9 SYSTEMIC LUPUS ERYTHEMATOSUS, UNSPECIFIED SLE TYPE, UNSPECIFIED ORGAN INVOLVEMENT STATUS: ICD-10-CM

## 2022-10-04 PROBLEM — J84.10 FIBROSIS OF LUNG: Status: ACTIVE | Noted: 2022-09-06

## 2022-10-04 PROCEDURE — 94618 PULMONARY STRESS TESTING: CPT

## 2022-10-04 PROCEDURE — G0463 HOSPITAL OUTPT CLINIC VISIT: HCPCS

## 2022-10-04 RX ORDER — BUPROPION HYDROCHLORIDE 300 MG/1
1 TABLET ORAL DAILY
COMMUNITY
Start: 2022-10-03

## 2022-10-04 RX ORDER — AMLODIPINE BESYLATE 10 MG/1
10 TABLET ORAL DAILY
COMMUNITY
Start: 2022-09-24

## 2022-10-04 NOTE — PROGRESS NOTES
Subjective   Ally Ivory is a 76 y.o. female.     History of Present Illness   F/u for shortness of breath and abnormal CT scan  Shortness of breath started years ago but was progressively getting worse until started on mycophenolate and since then the shortness of breath is partially improved, also having dry cough.  No fever or weight loss     Never smoked but exposed to secondhand smoking.  No indoor pets, no exposure to birds.  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     Father had chronic lung disease but probably from smoking pipe, no known history of interstitial lung disease  Past Medical History:   Diagnosis Date   • Atrial fibrillation (HCC)    • Coronary artery disease    • Dyslipidemia    • Hypertension    • Hypothyroidism    • ILD (interstitial lung disease) (HCC)    • Lung fibrosis (HCC)    • Lupus (HCC)    • Lupus (HCC)    • Raynaud's disease      Current Outpatient Medications on File Prior to Visit   Medication Sig Dispense Refill   • albuterol sulfate  (90 Base) MCG/ACT inhaler albuterol sulfate HFA 90 mcg/actuation aerosol inhaler   2 PUFFS EVERY 6 HOURS AS NEEDED FOR SHORTNESS OF BREATH/WHEEZING     • amLODIPine (NORVASC) 10 MG tablet Take 10 mg by mouth Daily.     • bisoprolol (ZEBeta) 10 MG tablet Take 0.5 tablets by mouth Daily. 90 tablet 3   • buPROPion XL (WELLBUTRIN XL) 300 MG 24 hr tablet Take 1 tablet by mouth Daily.     • cyanocobalamin 1000 MCG/ML injection Inject 1,000 mcg into the appropriate muscle as directed by prescriber Every 30 (Thirty) Days.  4   • Fluticasone-Umeclidin-Vilant (Trelegy Ellipta) 200-62.5-25 MCG/INH inhaler Inhale 1 puff Daily. 1 each 0   • folic acid (FOLVITE) 800 MCG tablet Take 800 mcg by mouth Daily. 800mcg daily     • hydroxychloroquine (PLAQUENIL) 200 MG tablet Take 200 mg by mouth Daily.     • isosorbide mononitrate (IMDUR) 30 MG 24 hr tablet Take 1 tablet  "by mouth Every Morning. 30 tablet 11   • levothyroxine (SYNTHROID, LEVOTHROID) 137 MCG tablet Take 1 tablet by mouth Daily.     • mycophenolate (CELLCEPT) 500 MG tablet Take  by mouth 2 (Two) Times a Day.     • nitroglycerin (Nitrostat) 0.4 MG SL tablet Place 1 tablet under the tongue Every 5 (Five) Minutes As Needed for Chest Pain. Take no more than 3 doses in 15 minutes. 30 tablet 12   • PARoxetine (PAXIL) 30 MG tablet Take 1 tablet by mouth Daily.     • rosuvastatin (CRESTOR) 5 MG tablet Take 5 mg by mouth Daily.       No current facility-administered medications on file prior to visit.     Social History     Tobacco Use   • Smoking status: Never Smoker   • Smokeless tobacco: Never Used   Vaping Use   • Vaping Use: Never used   Substance Use Topics   • Alcohol use: Not Currently   • Drug use: Never     Family History   Problem Relation Age of Onset   • No Known Problems Mother    • Lung disease Father        The following portions of the patient's history were reviewed and updated as appropriate: allergies, current medications, past family history, past medical history, past social history, past surgical history and problem list.    Review of Systems   Constitutional: Negative for chills, fever and malaise/fatigue.   HENT: Positive for hoarseness  Eyes: Negative.    Cardiovascular: Negative.    Respiratory: Positive for dry cough and shortness of breath.    Skin: Negative.    Musculoskeletal: Arthralgias are controlled with current treatment  Gastrointestinal: Negative.    Genitourinary: Negative.    Neurological: Negative.    Psychiatric/Behavioral: Negative.    Objective   Physical Exam  Blood pressure 138/79, pulse 59, resp. rate 14, height 167.6 cm (66\"), weight 91.6 kg (202 lb), SpO2 95 %.  General Appearance:  Alert   HEENT:  Normocephalic, without obvious abnormality, Conjunctiva/corneas clear,.   Nares normal, no drainage     Neck:  Supple, symmetrical, trachea midline. No JVD.  Lungs /Chest wall: Dry " Velcro type crackles in lower third of the lungs posteriorly, respirations unlabored, symmetrical wall movement.     Heart:  Regular rate and rhythm, S1 S2 normal  Abdomen: Soft, non-tender, no masses, no organomegaly.    Extremities: No edema, no clubbing or cyanosis    Results for orders placed during the hospital encounter of 10/04/21    Adult Transesophageal Echo (TON) W/ Cont if Necessary Per Protocol    Interpretation Summary  TON   procedure note    Procedure:  TON    Indication:   georgette Adams    Date of procedure  : 10/4/2021    :  Dr. Eric Low    Description of procedure:    Under conscious sedation given by anesthesiology and local anesthesia, a TON probe was advanced into the esophagus and into the stomach 2D, M-mode, color Doppler images were obtained..  Patient tolerated procedure well complications well.    Complications: none    Results:    Normal LV size and contractility LV contractility, EF of 60%  Normal RV size  Biatrial enlargement seen.  Left atrial appendage has watchman left atrial appendage occluder device seated well.  Aortic valve, mitral valve, tricuspid valve appears structurally normal, moderate aortic, mitral, tricuspid seen.  Elevated RV systolic pressures at 58 mmHg consistent with pulmonary hypertension seen  No vegetation seen  No pericardial effusion seen.  Proximal aorta appears normal in size.  Mild aortic plaque seen      Recommendations/plan:    Clinical correlation recommended    CT Chest Without Contrast Diagnostic    Result Date: 8/12/2022  Stable appearance of chronic interstitial lung disease/pulmonary fibrosis  Electronically Signed ByJarret Land On:8/12/2022 10:36 AM This report was finalized on [id number] by  Macario Land, .    XR Chest 1 View    Result Date: 8/5/2022  Pulmonary fibrosis with no acute cardiopulmonary process demonstrated  Electronically Signed ByJarret Land On:8/5/2022 8:14 AM This report was finalized on  [id number] by  Macario Land, .      Assessment & Plan   Interstitial lung disease: Most likely related to connective tissue disease, it has progressed on CT scan from 2013 to August 2021 but no significant change with the latest CT scan June 2022  History of lupus and rheumatoid arthritis: Currently on hydroxychloroquine and started on mycophenolate July 2022 by her rheumatologist   PFTs done August 2022 FVC 61%, FEV1 69%, TLC 73%, DLCO 44% which is not significantly changed from December 2021 shows FVC 55% improving to 69% after bronchodilators, FEV1 54% and improving to 76% after bronchodilators, TLC 77%, DLCO 47%  Ct  Trelegy 1 inhalation once a day:  prescription given and patient was instructed how to use it     Discussed with the patient that since there has not been significant progression in the fibrosis in the last year i.e. less than 10% then we will continue to monitor that with the expectation that immunomodulation with mycophenolate should stabilize the lung disease.     Pulmonary hypertension: TON 10/2021 Elevated RV systolic pressures at 58 mmHg consistent with pulmonary hypertension seen, moderate AI and  MR: can't use Sildenafil as long as she is using Imdur and with valvular heart disease Sildenafil is not the preferred treatment..  Patient was prescribed oxygen in August 2022 for hypoxemia: We will check 6-minute walk and overnight oximetry on room air    CAD s/p Stent, currently on Imdur and followed by Dr Maranda Marti s/p  watchman,  hypertension, dyslipidemia, h/o bradycardia : improved after stopping BB.     Excessive daytime sleepiness: Possible ELIS, will check home sleep study        W/u in 3/15/2022 with no signs of active inflammation      We will plan to follow PFTs every 6 months and CT scan every year and if there is signs of progression we will consider antifibrotic's.

## 2022-10-04 NOTE — PROGRESS NOTES
Exercise Oximetry    Patient Name:Ally Ivory   MRN: [id number]   Date: 10/04/22             ROOM AIR BASELINE   SpO2%     93   Heart Rate    66   Blood Pressure      EXERCISE ON ROOM AIR SpO2% EXERCISE ON O2 @  3 LPM SpO2%   1 MINUTE 91 1 MINUTE   92   2 MINUTES 90 2 MINUTES 94   3 MINUTES 87 3 MINUTES 94   4 MINUTES  4 MINUTES 94   5 MINUTES  5 MINUTES 94   6 MINUTES  6 MINUTES 96              Distance Walked    500  feet Distance Walked   1000  feet   Dyspnea (Rian Scale)   Dyspnea (Rian Scale)      4   Fatigue (Rian Scale)   Fatigue (Rian Scale)   SpO2% Post Exercise   SpO2% Post Exercise    99   HR Post Exercise   HR Post Exercise    56   Time to Recovery   Time to Recovery     Comments: Walked  Pt on  Room  Air.  Patient  Had  Face  Mask  On  At  All  Times.  O2  satruation  Dropped  To  87%  HR  102.  Placed  Pt  On  2  Lpm  Nc.  O2  Saturation  At  Rest  Recovered  To  98%  HR  61.  Walked  Pt again.  O2  Saturation  Dropped  To  91%  And  o2  Was  increaed  To  3  Lpm.  O2  Saturation  Maintained  At  92%  Or  Better  On  Duration  Of  Walk.   Pt  Needs  3  Lpm  On  Activity.   Patient  Does  Well  At  2  Lpm  Nc  At  Rest

## 2022-11-18 DIAGNOSIS — G47.10 HYPERSOMNOLENCE: ICD-10-CM

## 2022-11-18 DIAGNOSIS — R06.83 SNORING: Primary | ICD-10-CM

## 2023-02-06 DIAGNOSIS — J84.9 ILD (INTERSTITIAL LUNG DISEASE): Primary | ICD-10-CM

## 2023-02-06 DIAGNOSIS — R09.02 EXERCISE HYPOXEMIA: ICD-10-CM

## 2023-03-17 ENCOUNTER — HOSPITAL ENCOUNTER (OUTPATIENT)
Dept: RESPIRATORY THERAPY | Facility: HOSPITAL | Age: 78
Discharge: HOME | End: 2023-03-17
Admitting: INTERNAL MEDICINE
Payer: MEDICARE

## 2023-03-17 VITALS — HEART RATE: 66 BPM | OXYGEN SATURATION: 99 % | RESPIRATION RATE: 12 BRPM

## 2023-03-17 DIAGNOSIS — J84.9 ILD (INTERSTITIAL LUNG DISEASE): ICD-10-CM

## 2023-03-17 DIAGNOSIS — R09.02 EXERCISE HYPOXEMIA: ICD-10-CM

## 2023-03-17 PROCEDURE — 94799 UNLISTED PULMONARY SVC/PX: CPT

## 2023-03-17 PROCEDURE — 94729 DIFFUSING CAPACITY: CPT

## 2023-03-17 PROCEDURE — 94060 EVALUATION OF WHEEZING: CPT

## 2023-03-17 PROCEDURE — 94664 DEMO&/EVAL PT USE INHALER: CPT

## 2023-03-17 PROCEDURE — 94618 PULMONARY STRESS TESTING: CPT

## 2023-03-17 PROCEDURE — 94727 GAS DIL/WSHOT DETER LNG VOL: CPT

## 2023-03-17 RX ORDER — ALBUTEROL SULFATE 90 UG/1
2 AEROSOL, METERED RESPIRATORY (INHALATION) ONCE
Status: COMPLETED | OUTPATIENT
Start: 2023-03-17 | End: 2023-03-17

## 2023-03-17 RX ADMIN — ALBUTEROL SULFATE 2 PUFF: 108 INHALANT RESPIRATORY (INHALATION) at 16:09

## 2023-03-17 NOTE — PROGRESS NOTES
Exercise Oximetry    Patient Name:Ally Ivory   MRN: [id number]   Date: 03/17/23             ROOM AIR BASELINE   SpO2%   99   Heart Rate   64   Blood Pressure      EXERCISE ON ROOM AIR SpO2% EXERCISE ON O2 @  LPM SpO2%   1 MINUTE   98 1 MINUTE    2 MINUTES   96 2 MINUTES    3 MINUTES   95 3 MINUTES    4 MINUTES   94 4 MINUTES    5 MINUTES   92 5 MINUTES    6 MINUTES   93 6 MINUTES               Distance Walked  1250 feet Distance Walked   Dyspnea (Rian Scale)    8 Dyspnea (Rian Scale)   Fatigue (Rian Scale)    8 Fatigue (Rian Scale)   SpO2% Post Exercise    99 SpO2% Post Exercise   HR Post Exercise    66 HR Post Exercise   Time to Recovery  2 minutes Time to Recovery     Comments: Pt ambulated on room air at steady pace for 6 minutes.

## 2023-04-03 NOTE — H&P (VIEW-ONLY)
Subjective:     Encounter Date:04/04/2023      Patient ID: Ally Ivory is a 77 y.o. female.    Chief Complaint and history of present illness:     Follow-up for CAD, PCI, A. fib, watchman    History of Present Illness           Ms. Ally Ivory has PMH of     #.CAD, JUAN , cath 5/9/18 Severe disease in OM1 branch of LCX,FFR RCA 0.97  Patent stent in ostial right with moderate InStent stenosis and noncritical FFR at 0.90  Elevated  LVEDP with normal LV systolic function. cath  01/21/2019 PTCA to instent  RCA.  Repeat stent to RCA in [place] in 2019  #. CAROLYN-TACHY SYNDROME with  P. A.FIB  , long-term anticoagulation, s/p watchman 8/19/2021, post watchman 1 year TON 10/4/21 revealed a PA systolic pressure 58 mmHg  #.  Moderate aortic regurgitation and mitral regurgitation  #   dyslipidemia,statin allergy  #  hypertension, positive family history, obesity, hypothyroidism.  #  history of lupus, thrombocytopenia, pernicious anemia. psoriasis, Raynaud's, Sjogren's, RA  #.  Interstitial lung disease, PFTs 12/2021 FVC 55%-improved to 69 after bronchodilators, FEV1 54% improved to 76%, TLC 77%, DLCO 47%-/10 Becca Chan MD  #.  Pulmonary hypertension, TON 10/2021 elevated RV systolic pressure 58 mmHg  #   cholecystectomy  #.  moderate MR and -moderate AI, last echo 20 60  #  type 2 diabetes. (Patient says she is not diabetic.)        Here for   followup.  Patient has been feeling poorly since CellCept was started by rheumatology..  Was severely orthostatic and medications were adjusted presented to the hospital 8/5/2022 with chest pain.  Underwent Lexiscan Cardiolite 8/5/2022 which was low risk abnormal.  Is here for follow-up.  Patient denies any chest pain has shortness of breath fatigue palpitations lightheadedness dizziness and edema.     Patient's arterial blood pressure is   109/40, heart rate 52, O2 sat of 98% on room air.  Patient's BMI is over 30.     Patient  had labs done 08/20/2018 with  Subjective:       Patient ID: Nadja Rucker is a 61 y.o. female.    Chief Complaint: Follow-up    F/u blood pressure    HPI: 62 y/o w hypothyroid HTN for follow up. Last visit amlodipine increased to 10mg folow upbp check still abvoe goal so olmesartan was added no adverse effects feels well. Had injection for left knee last month and to start PT next month feels some improvement     Review of Systems   Constitutional:  Negative for activity change, appetite change, fatigue, fever and unexpected weight change.   HENT:  Negative for ear pain, rhinorrhea and sore throat.    Eyes:  Negative for discharge and visual disturbance.   Respiratory:  Negative for chest tightness, shortness of breath and wheezing.    Cardiovascular:  Negative for chest pain, palpitations and leg swelling.   Gastrointestinal:  Negative for abdominal pain, constipation and diarrhea.   Endocrine: Negative for cold intolerance and heat intolerance.   Genitourinary:  Negative for dysuria and hematuria.   Musculoskeletal:  Positive for arthralgias. Negative for joint swelling and neck stiffness.   Skin:  Negative for rash.   Neurological:  Negative for dizziness, syncope, weakness and headaches.   Psychiatric/Behavioral:  Negative for suicidal ideas.      Objective:     Vitals:    12/16/22 0924   BP: 130/76   BP Location: Left arm   Patient Position: Sitting   BP Method: Large (Manual)   Pulse: 83   Resp: 18   Temp: 97.7 °F (36.5 °C)   TempSrc: Oral   SpO2: (!) 93%   Weight: 83.6 kg (184 lb 4.9 oz)          Physical Exam  Constitutional:       Appearance: She is well-developed.   HENT:      Head: Normocephalic and atraumatic.   Eyes:      Conjunctiva/sclera: Conjunctivae normal.   Cardiovascular:      Rate and Rhythm: Normal rate and regular rhythm.      Heart sounds: No murmur heard.    No friction rub. No gallop.   Pulmonary:      Effort: Pulmonary effort is normal. No respiratory distress.      Breath sounds: Normal breath sounds. No wheezing  or rales.   Musculoskeletal:         General: No tenderness. Normal range of motion.      Cervical back: Normal range of motion.   Skin:     General: Skin is warm and dry.   Neurological:      Mental Status: She is alert and oriented to person, place, and time.      Cranial Nerves: No cranial nerve deficit.       Assessment and Plan   1. Hypertension, essential  At goal continue ccb and arb check potassium and renal function today  - Basic Metabolic Panel; Future  - olmesartan (BENICAR) 20 MG tablet; Take 1 tablet (20 mg total) by mouth once daily.  Dispense: 90 tablet; Refill: 1    2. Post-surgical hypothyroidism  Continue synthroid  - TSH; Future     rheumatology CMP was unremarkable.  Labs done 11/22/2019 revealed normal CMP, CK, cholesterol of 191, HDL 40  triglycerides 148, Labs from 11/15/2020 reveal cholesterol 95 triglycerides 90 HDL 41 LDL 36 CMP and Ck unremarkable.  Labs from 6/3/2021 revealed normal CMP, CBC with a platelet count of 114.  Labs 8/19/2021 revealed normal CBC with platelet count of 77 and Chem-7 with low calcium. Labs from 10/1/2021 revealed normal Chem-7 and CBC  CT chest with contrast done 1/26/2022 revealed symmetric groundglass opacities seen throughout all lung fields likely represent pulmonary edema or other differential include infectious and inflammatory etiologies clinical correlation recommended.  Patient had splenomegaly.      Labs from 8/5/2022 revealed normal CMP, troponin x3 and proBNP at 603.  CBC with a platelet count of 91.         ASSESSMENT:     -Recurrent dizziness and near syncope  -Symptomatic bradycardia  -Abnormal CT chest  -Fatigue, dyspnea on exertion  -Obesity BMI over 36  -Pulmonary hypertension  #Atrial fibrillation, status post watchman  #Thrombocytopenia  #Sinus node dysfunction with junctional bradycardia  #Dyslipidemia  # . chronic CHF due to diastolic dysfunction with normal LV systolic function in the past  #.   CAD ,PCI  #.  hypertension, dyslipidemia  #.   ambrocio-tachy syndrome , s/p ILR  #  PVC  #.  moderate MR and-moderate AI      PLAN:     Reviewed EKG results with patient.  Patient is not on any medications continues to be bradycardic and asymptomatic with recurrent dizziness and near syncope and feels like she is losing balance.  Will benefit from permanent pacemaker.  Shared decision making was done with patient.  Risk benefits alternatives explained.  Patient is thrombocytopenic we will hold off on aspirin.    Patient's [id number] over is 5,due to female gender, age over 75, hypertension, congestive heart failure ,will benefit from long-term anticoagulation.  But patient has  thrombocytopenia which makes her a poor candidate for long-term anticoagulation.  Therefore underwent watchman.     Patient's BMI is over 30, counseled on weight loss diet and exercise.  Follow-up with pulmonary and rheumatology.  Advised her to wear knee-high stockings.            Procedures Lexiscan Cardiolite performed 8/5/2022 reveals  EF of 66% with small sized infarct in apex.  EKG done 8/5/2022 reviewed/interpreted by me reveals sinus bradycardia at the rate of 56 bpm            ECG 12 Lead    Date/Time: 4/4/2023 10:26 AM  Performed by: Eric Low MD  Authorized by: Eric Low MD   Comparison: compared with previous ECG from 8/5/2022  Comparison to previous ECG: EKG done today reviewed/interpreted by me reveals sinus bradycardia at the rate of 52 bpm with PVCs, no new change compared EKG from 8/5/2022              Copied text in this portion of the note has been reviewed and is accurate as of 4/4/2023  The following portions of the patient's history were reviewed and updated as appropriate: allergies, current medications, past family history, past medical history, past social history, past surgical history and problem list.    Assessment:         MDM     Diagnosis Plan   1. Sick sinus syndrome  Case Request Cath Lab: Pacemaker DC new    Obtain Informed Consent    Record Actual Height & Weight Prior to Procedure    Confirm NPO Status    Cardiac Monitoring    CPAP or BiPAP As Indicated Per Respiratory Protocol    Insert Peripheral IV x2 - 20G - One in Each Arm    Have Patient Void Prior to Procedure.    No Telemetry Pads or Tape Over Implantation Site    Insert Peripheral IV    Saline Lock & Maintain IV Access    sodium chloride 0.9 % flush 10 mL    sodium chloride 0.9 % flush 10 mL    sodium chloride 0.9 % infusion 40 mL    neomycin-polymyxin B (NEOSPORIN-) 1 mL in sodium chloride (NS) 1,000 mL irrigation    vancomycin (VANCOCIN) 1,250 mg in sodium chloride 0.9 % 250 mL IVPB     CBC (No Diff)    Basic Metabolic Panel    Protime-INR      2. Dizziness  Case Request Cath Lab: Pacemaker DC new    Obtain Informed Consent    Record Actual Height & Weight Prior to Procedure    Confirm NPO Status    Cardiac Monitoring    CPAP or BiPAP As Indicated Per Respiratory Protocol    Insert Peripheral IV x2 - 20G - One in Each Arm    Have Patient Void Prior to Procedure.    No Telemetry Pads or Tape Over Implantation Site    Insert Peripheral IV    Saline Lock & Maintain IV Access    sodium chloride 0.9 % flush 10 mL    sodium chloride 0.9 % flush 10 mL    sodium chloride 0.9 % infusion 40 mL    neomycin-polymyxin B (NEOSPORIN-) 1 mL in sodium chloride (NS) 1,000 mL irrigation    vancomycin (VANCOCIN) 1,250 mg in sodium chloride 0.9 % 250 mL IVPB    CBC (No Diff)    Basic Metabolic Panel    Protime-INR      3. Near syncope  Case Request Cath Lab: Pacemaker DC new    Obtain Informed Consent    Record Actual Height & Weight Prior to Procedure    Confirm NPO Status    Cardiac Monitoring    CPAP or BiPAP As Indicated Per Respiratory Protocol    Insert Peripheral IV x2 - 20G - One in Each Arm    Have Patient Void Prior to Procedure.    No Telemetry Pads or Tape Over Implantation Site    Insert Peripheral IV    Saline Lock & Maintain IV Access    sodium chloride 0.9 % flush 10 mL    sodium chloride 0.9 % flush 10 mL    sodium chloride 0.9 % infusion 40 mL    neomycin-polymyxin B (NEOSPORIN-) 1 mL in sodium chloride (NS) 1,000 mL irrigation    vancomycin (VANCOCIN) 1,250 mg in sodium chloride 0.9 % 250 mL IVPB    CBC (No Diff)    Basic Metabolic Panel    Protime-INR      4. Bradycardia  Case Request Cath Lab: Pacemaker DC new    Obtain Informed Consent    Record Actual Height & Weight Prior to Procedure    Confirm NPO Status    Cardiac Monitoring    CPAP or BiPAP As Indicated Per Respiratory Protocol    Insert Peripheral IV x2 - 20G - One in Each Arm    Have Patient Void Prior to Procedure.    No Telemetry  Pads or Tape Over Implantation Site    Insert Peripheral IV    Saline Lock & Maintain IV Access    sodium chloride 0.9 % flush 10 mL    sodium chloride 0.9 % flush 10 mL    sodium chloride 0.9 % infusion 40 mL    neomycin-polymyxin B (NEOSPORIN-) 1 mL in sodium chloride (NS) 1,000 mL irrigation    vancomycin (VANCOCIN) 1,250 mg in sodium chloride 0.9 % 250 mL IVPB    CBC (No Diff)    Basic Metabolic Panel    Protime-INR      5. Paroxysmal atrial fibrillation  Case Request Cath Lab: Pacemaker DC new    Obtain Informed Consent    Record Actual Height & Weight Prior to Procedure    Confirm NPO Status    Cardiac Monitoring    CPAP or BiPAP As Indicated Per Respiratory Protocol    Insert Peripheral IV x2 - 20G - One in Each Arm    Have Patient Void Prior to Procedure.    No Telemetry Pads or Tape Over Implantation Site    Insert Peripheral IV    Saline Lock & Maintain IV Access    sodium chloride 0.9 % flush 10 mL    sodium chloride 0.9 % flush 10 mL    sodium chloride 0.9 % infusion 40 mL    neomycin-polymyxin B (NEOSPORIN-) 1 mL in sodium chloride (NS) 1,000 mL irrigation    vancomycin (VANCOCIN) 1,250 mg in sodium chloride 0.9 % 250 mL IVPB    CBC (No Diff)    Basic Metabolic Panel    Protime-INR      6. Presence of Watchman left atrial appendage closure device  Case Request Cath Lab: Pacemaker DC new    Obtain Informed Consent    Record Actual Height & Weight Prior to Procedure    Confirm NPO Status    Cardiac Monitoring    CPAP or BiPAP As Indicated Per Respiratory Protocol    Insert Peripheral IV x2 - 20G - One in Each Arm    Have Patient Void Prior to Procedure.    No Telemetry Pads or Tape Over Implantation Site    Insert Peripheral IV    Saline Lock & Maintain IV Access    sodium chloride 0.9 % flush 10 mL    sodium chloride 0.9 % flush 10 mL    sodium chloride 0.9 % infusion 40 mL    neomycin-polymyxin B (NEOSPORIN-) 1 mL in sodium chloride (NS) 1,000 mL irrigation    vancomycin (VANCOCIN) 1,250 mg in  sodium chloride 0.9 % 250 mL IVPB    CBC (No Diff)    Basic Metabolic Panel    Protime-INR      7. Essential hypertension  Case Request Cath Lab: Pacemaker DC new    Obtain Informed Consent    Record Actual Height & Weight Prior to Procedure    Confirm NPO Status    Cardiac Monitoring    CPAP or BiPAP As Indicated Per Respiratory Protocol    Insert Peripheral IV x2 - 20G - One in Each Arm    Have Patient Void Prior to Procedure.    No Telemetry Pads or Tape Over Implantation Site    Insert Peripheral IV    Saline Lock & Maintain IV Access    sodium chloride 0.9 % flush 10 mL    sodium chloride 0.9 % flush 10 mL    sodium chloride 0.9 % infusion 40 mL    neomycin-polymyxin B (NEOSPORIN-) 1 mL in sodium chloride (NS) 1,000 mL irrigation    vancomycin (VANCOCIN) 1,250 mg in sodium chloride 0.9 % 250 mL IVPB    CBC (No Diff)    Basic Metabolic Panel    Protime-INR      8. Obesity (BMI 30-39.9)  Case Request Cath Lab: Pacemaker DC new    Obtain Informed Consent    Record Actual Height & Weight Prior to Procedure    Confirm NPO Status    Cardiac Monitoring    CPAP or BiPAP As Indicated Per Respiratory Protocol    Insert Peripheral IV x2 - 20G - One in Each Arm    Have Patient Void Prior to Procedure.    No Telemetry Pads or Tape Over Implantation Site    Insert Peripheral IV    Saline Lock & Maintain IV Access    sodium chloride 0.9 % flush 10 mL    sodium chloride 0.9 % flush 10 mL    sodium chloride 0.9 % infusion 40 mL    neomycin-polymyxin B (NEOSPORIN-) 1 mL in sodium chloride (NS) 1,000 mL irrigation    vancomycin (VANCOCIN) 1,250 mg in sodium chloride 0.9 % 250 mL IVPB    CBC (No Diff)    Basic Metabolic Panel    Protime-INR      9. Dyslipidemia  Case Request Cath Lab: Pacemaker DC new    Obtain Informed Consent    Record Actual Height & Weight Prior to Procedure    Confirm NPO Status    Cardiac Monitoring    CPAP or BiPAP As Indicated Per Respiratory Protocol    Insert Peripheral IV x2 - 20G - One in Each Arm     Have Patient Void Prior to Procedure.    No Telemetry Pads or Tape Over Implantation Site    Insert Peripheral IV    Saline Lock & Maintain IV Access    sodium chloride 0.9 % flush 10 mL    sodium chloride 0.9 % flush 10 mL    sodium chloride 0.9 % infusion 40 mL    neomycin-polymyxin B (NEOSPORIN-) 1 mL in sodium chloride (NS) 1,000 mL irrigation    vancomycin (VANCOCIN) 1,250 mg in sodium chloride 0.9 % 250 mL IVPB    CBC (No Diff)    Basic Metabolic Panel    Protime-INR      10. Pulmonary hypertension  Case Request Cath Lab: Pacemaker DC new    Obtain Informed Consent    Record Actual Height & Weight Prior to Procedure    Confirm NPO Status    Cardiac Monitoring    CPAP or BiPAP As Indicated Per Respiratory Protocol    Insert Peripheral IV x2 - 20G - One in Each Arm    Have Patient Void Prior to Procedure.    No Telemetry Pads or Tape Over Implantation Site    Insert Peripheral IV    Saline Lock & Maintain IV Access    sodium chloride 0.9 % flush 10 mL    sodium chloride 0.9 % flush 10 mL    sodium chloride 0.9 % infusion 40 mL    neomycin-polymyxin B (NEOSPORIN-) 1 mL in sodium chloride (NS) 1,000 mL irrigation    vancomycin (VANCOCIN) 1,250 mg in sodium chloride 0.9 % 250 mL IVPB    CBC (No Diff)    Basic Metabolic Panel    Protime-INR      11. Nonrheumatic mitral valve regurgitation  Case Request Cath Lab: Pacemaker DC new    Obtain Informed Consent    Record Actual Height & Weight Prior to Procedure    Confirm NPO Status    Cardiac Monitoring    CPAP or BiPAP As Indicated Per Respiratory Protocol    Insert Peripheral IV x2 - 20G - One in Each Arm    Have Patient Void Prior to Procedure.    No Telemetry Pads or Tape Over Implantation Site    Insert Peripheral IV    Saline Lock & Maintain IV Access    sodium chloride 0.9 % flush 10 mL    sodium chloride 0.9 % flush 10 mL    sodium chloride 0.9 % infusion 40 mL    neomycin-polymyxin B (NEOSPORIN-) 1 mL in sodium chloride (NS) 1,000 mL irrigation     vancomycin (VANCOCIN) 1,250 mg in sodium chloride 0.9 % 250 mL IVPB    CBC (No Diff)    Basic Metabolic Panel    Protime-INR      12. CAD S/P percutaneous coronary angioplasty  Case Request Cath Lab: Pacemaker DC new    Obtain Informed Consent    Record Actual Height & Weight Prior to Procedure    Confirm NPO Status    Cardiac Monitoring    CPAP or BiPAP As Indicated Per Respiratory Protocol    Insert Peripheral IV x2 - 20G - One in Each Arm    Have Patient Void Prior to Procedure.    No Telemetry Pads or Tape Over Implantation Site    Insert Peripheral IV    Saline Lock & Maintain IV Access    sodium chloride 0.9 % flush 10 mL    sodium chloride 0.9 % flush 10 mL    sodium chloride 0.9 % infusion 40 mL    neomycin-polymyxin B (NEOSPORIN-) 1 mL in sodium chloride (NS) 1,000 mL irrigation    vancomycin (VANCOCIN) 1,250 mg in sodium chloride 0.9 % 250 mL IVPB    CBC (No Diff)    Basic Metabolic Panel    Protime-INR             Plan:               Past Medical History:  Past Medical History:   Diagnosis Date   • Atrial fibrillation    • Coronary artery disease    • Dyslipidemia    • Hypertension    • Hypothyroidism    • ILD (interstitial lung disease)    • Lung fibrosis    • Lupus    • Lupus    • Raynaud's disease      Past Surgical History:  Past Surgical History:   Procedure Laterality Date   • ATRIAL APPENDAGE EXCLUSION LEFT WITH TRANSESOPHAGEAL ECHOCARDIOGRAM N/A 8/19/2021    Procedure: Atrial Appendage Occlusion;  Surgeon: Eric Low MD;  Location: [facility] CATH INVASIVE LOCATION;  Service: Cardiovascular;  Laterality: N/A;   • ATRIAL APPENDAGE EXCLUSION LEFT WITH TRANSESOPHAGEAL ECHOCARDIOGRAM N/A 8/19/2021    Procedure: Atrial Appendage Occlusion;  Surgeon: Francisco Garza MD;  Location: [facility] CATH INVASIVE LOCATION;  Service: Cardiovascular;  Laterality: N/A;   • CARDIAC CATHETERIZATION     • CATARACT EXTRACTION     • CHOLECYSTECTOMY     • ENDOSCOPY N/A 8/20/2021    Procedure:  ESOPHAGOGASTRODUODENOSCOPY;  Surgeon: Joao Cross MD;  Location: [facility] ENDOSCOPY;  Service: Gastroenterology;  Laterality: N/A;  submucosal ecchymosis 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   • HYSTERECTOMY     • REPLACEMENT TOTAL KNEE BILATERAL        Allergies:  Allergies   Allergen Reactions   • Pravastatin Rash     rash     • Vancomycin Hives     Home Meds:  Current Meds:     Current Outpatient Medications:   •  albuterol sulfate  (90 Base) MCG/ACT inhaler, albuterol sulfate HFA 90 mcg/actuation aerosol inhaler  2 PUFFS EVERY 6 HOURS AS NEEDED FOR SHORTNESS OF BREATH/WHEEZING, Disp: , Rfl:   •  cyanocobalamin 1000 MCG/ML injection, Inject 1 mL into the appropriate muscle as directed by prescriber Every 30 (Thirty) Days., Disp: , Rfl: 4  •  Fluticasone-Umeclidin-Vilant (Trelegy Ellipta) 200-62.5-25 MCG/INH inhaler, Inhale 1 puff Daily., Disp: 1 each, Rfl: 0  •  folic acid (FOLVITE) 800 MCG tablet, Take 1 tablet by mouth Daily. 800mcg daily, Disp: , Rfl:   •  hydroxychloroquine (PLAQUENIL) 200 MG tablet, Take 1 tablet by mouth Daily., Disp: , Rfl:   •  isosorbide mononitrate (IMDUR) 30 MG 24 hr tablet, Take 1 tablet by mouth Every Morning., Disp: 30 tablet, Rfl: 11  •  levothyroxine (SYNTHROID, LEVOTHROID) 137 MCG tablet, Take 1 tablet by mouth Daily., Disp: , Rfl:   •  mycophenolate (CELLCEPT) 500 MG tablet, Take  by mouth 2 (Two) Times a Day., Disp: , Rfl:   •  nitroglycerin (Nitrostat) 0.4 MG SL tablet, Place 1 tablet under the tongue Every 5 (Five) Minutes As Needed for Chest Pain. Take no more than 3 doses in 15 minutes., Disp: 30 tablet, Rfl: 12  •  PARoxetine (PAXIL) 30 MG tablet, Take 1 tablet by mouth Daily., Disp: , Rfl:   •  rosuvastatin (CRESTOR) 5 MG tablet, Take 1 tablet by mouth Daily., Disp: , Rfl:   •  amLODIPine (NORVASC) 10 MG tablet, Take 10 mg by mouth Daily. (Patient not taking: Reported on 4/4/2023), Disp: , Rfl:   •  bisoprolol (ZEBeta) 10 MG tablet, Take 0.5  "tablets by mouth Daily. (Patient not taking: Reported on 4/4/2023), Disp: 90 tablet, Rfl: 3  •  buPROPion XL (WELLBUTRIN XL) 300 MG 24 hr tablet, Take 1 tablet by mouth Daily. (Patient not taking: Reported on 4/4/2023), Disp: , Rfl:     Current Facility-Administered Medications:   •  sodium chloride 0.9 % infusion 40 mL, 40 mL, Intravenous, PRN, Eric Low MD  Social History:   Social History     Tobacco Use   • Smoking status: Never   • Smokeless tobacco: Never   Substance Use Topics   • Alcohol use: Not Currently      Family History:  Family History   Problem Relation Age of Onset   • No Known Problems Mother    • Lung disease Father               Review of Systems   Cardiovascular: Negative for chest pain, leg swelling and palpitations.   Respiratory: Positive for shortness of breath.    Neurological: Positive for dizziness and numbness.     All other systems are negative         Objective:     Physical Exam  /71 (BP Location: Left arm, Patient Position: Sitting, Cuff Size: Large Adult)   Pulse 50   Ht 167.6 cm (66\")   Wt 86.2 kg (190 lb)   SpO2 97%   BMI 30.67 kg/m²   General:  Appears in no acute distress  Eyes: Sclera is anicteric,  conjunctiva is clear   HEENT:  No JVD.  No carotid bruits  Respiratory: Respirations regular and unlabored at rest.  Clear to auscultation  Cardiovascular: S1,S2 Regular rate and rhythm. No murmur, rub or gallop auscultated.   Extremities: No digital clubbing or cyanosis, no edema  Skin: Color pink. Skin warm and dry to touch. No rashes  No xanthoma  Neuro: Alert and awake.    Lab Reviewed:         Eric Low MD  4/4/2023 10:36 EDT      EMR Dragon/Transcription:   \"Dictated utilizing Dragon dictation\".        "

## 2023-04-03 NOTE — PROGRESS NOTES
Subjective:     Encounter Date:04/04/2023      Patient ID: Ally Ivory is a 77 y.o. female.    Chief Complaint and history of present illness:     Follow-up for CAD, PCI, A. fib, watchman    History of Present Illness           Ms. Ally Ivory has PMH of     #.CAD, JUAN , cath 5/9/18 Severe disease in OM1 branch of LCX,FFR RCA 0.97  Patent stent in ostial right with moderate InStent stenosis and noncritical FFR at 0.90  Elevated  LVEDP with normal LV systolic function. cath  01/21/2019 PTCA to instent  RCA.  Repeat stent to RCA in [place] in 2019  #. CAROLYN-TACHY SYNDROME with  P. A.FIB  , long-term anticoagulation, s/p watchman 8/19/2021, post watchman 1 year TON 10/4/21 revealed a PA systolic pressure 58 mmHg  #.  Moderate aortic regurgitation and mitral regurgitation  #   dyslipidemia,statin allergy  #  hypertension, positive family history, obesity, hypothyroidism.  #  history of lupus, thrombocytopenia, pernicious anemia. psoriasis, Raynaud's, Sjogren's, RA  #.  Interstitial lung disease, PFTs 12/2021 FVC 55%-improved to 69 after bronchodilators, FEV1 54% improved to 76%, TLC 77%, DLCO 47%-/10 Becca Chan MD  #.  Pulmonary hypertension, TON 10/2021 elevated RV systolic pressure 58 mmHg  #   cholecystectomy  #.  moderate MR and -moderate AI, last echo 20 60  #  type 2 diabetes. (Patient says she is not diabetic.)        Here for   followup.  Patient has been feeling poorly since CellCept was started by rheumatology..  Was severely orthostatic and medications were adjusted presented to the hospital 8/5/2022 with chest pain.  Underwent Lexiscan Cardiolite 8/5/2022 which was low risk abnormal.  Is here for follow-up.  Patient denies any chest pain has shortness of breath fatigue palpitations lightheadedness dizziness and edema.     Patient's arterial blood pressure is   109/40, heart rate 52, O2 sat of 98% on room air.  Patient's BMI is over 30.     Patient  had labs done 08/20/2018 with  rheumatology CMP was unremarkable.  Labs done 11/22/2019 revealed normal CMP, CK, cholesterol of 191, HDL 40  triglycerides 148, Labs from 11/15/2020 reveal cholesterol 95 triglycerides 90 HDL 41 LDL 36 CMP and Ck unremarkable.  Labs from 6/3/2021 revealed normal CMP, CBC with a platelet count of 114.  Labs 8/19/2021 revealed normal CBC with platelet count of 77 and Chem-7 with low calcium. Labs from 10/1/2021 revealed normal Chem-7 and CBC  CT chest with contrast done 1/26/2022 revealed symmetric groundglass opacities seen throughout all lung fields likely represent pulmonary edema or other differential include infectious and inflammatory etiologies clinical correlation recommended.  Patient had splenomegaly.      Labs from 8/5/2022 revealed normal CMP, troponin x3 and proBNP at 603.  CBC with a platelet count of 91.         ASSESSMENT:     -Recurrent dizziness and near syncope  -Symptomatic bradycardia  -Abnormal CT chest  -Fatigue, dyspnea on exertion  -Obesity BMI over 36  -Pulmonary hypertension  #Atrial fibrillation, status post watchman  #Thrombocytopenia  #Sinus node dysfunction with junctional bradycardia  #Dyslipidemia  # . chronic CHF due to diastolic dysfunction with normal LV systolic function in the past  #.   CAD ,PCI  #.  hypertension, dyslipidemia  #.   ambrocio-tachy syndrome , s/p ILR  #  PVC  #.  moderate MR and-moderate AI      PLAN:     Reviewed EKG results with patient.  Patient is not on any medications continues to be bradycardic and asymptomatic with recurrent dizziness and near syncope and feels like she is losing balance.  Will benefit from permanent pacemaker.  Shared decision making was done with patient.  Risk benefits alternatives explained.  Patient is thrombocytopenic we will hold off on aspirin.    Patient's [id number] over is 5,due to female gender, age over 75, hypertension, congestive heart failure ,will benefit from long-term anticoagulation.  But patient has  thrombocytopenia which makes her a poor candidate for long-term anticoagulation.  Therefore underwent watchman.     Patient's BMI is over 30, counseled on weight loss diet and exercise.  Follow-up with pulmonary and rheumatology.  Advised her to wear knee-high stockings.            Procedures Lexiscan Cardiolite performed 8/5/2022 reveals  EF of 66% with small sized infarct in apex.  EKG done 8/5/2022 reviewed/interpreted by me reveals sinus bradycardia at the rate of 56 bpm            ECG 12 Lead    Date/Time: 4/4/2023 10:26 AM  Performed by: Eric Low MD  Authorized by: Eric Low MD   Comparison: compared with previous ECG from 8/5/2022  Comparison to previous ECG: EKG done today reviewed/interpreted by me reveals sinus bradycardia at the rate of 52 bpm with PVCs, no new change compared EKG from 8/5/2022              Copied text in this portion of the note has been reviewed and is accurate as of 4/4/2023  The following portions of the patient's history were reviewed and updated as appropriate: allergies, current medications, past family history, past medical history, past social history, past surgical history and problem list.    Assessment:         MDM     Diagnosis Plan   1. Sick sinus syndrome  Case Request Cath Lab: Pacemaker DC new    Obtain Informed Consent    Record Actual Height & Weight Prior to Procedure    Confirm NPO Status    Cardiac Monitoring    CPAP or BiPAP As Indicated Per Respiratory Protocol    Insert Peripheral IV x2 - 20G - One in Each Arm    Have Patient Void Prior to Procedure.    No Telemetry Pads or Tape Over Implantation Site    Insert Peripheral IV    Saline Lock & Maintain IV Access    sodium chloride 0.9 % flush 10 mL    sodium chloride 0.9 % flush 10 mL    sodium chloride 0.9 % infusion 40 mL    neomycin-polymyxin B (NEOSPORIN-) 1 mL in sodium chloride (NS) 1,000 mL irrigation    vancomycin (VANCOCIN) 1,250 mg in sodium chloride 0.9 % 250 mL IVPB     CBC (No Diff)    Basic Metabolic Panel    Protime-INR      2. Dizziness  Case Request Cath Lab: Pacemaker DC new    Obtain Informed Consent    Record Actual Height & Weight Prior to Procedure    Confirm NPO Status    Cardiac Monitoring    CPAP or BiPAP As Indicated Per Respiratory Protocol    Insert Peripheral IV x2 - 20G - One in Each Arm    Have Patient Void Prior to Procedure.    No Telemetry Pads or Tape Over Implantation Site    Insert Peripheral IV    Saline Lock & Maintain IV Access    sodium chloride 0.9 % flush 10 mL    sodium chloride 0.9 % flush 10 mL    sodium chloride 0.9 % infusion 40 mL    neomycin-polymyxin B (NEOSPORIN-) 1 mL in sodium chloride (NS) 1,000 mL irrigation    vancomycin (VANCOCIN) 1,250 mg in sodium chloride 0.9 % 250 mL IVPB    CBC (No Diff)    Basic Metabolic Panel    Protime-INR      3. Near syncope  Case Request Cath Lab: Pacemaker DC new    Obtain Informed Consent    Record Actual Height & Weight Prior to Procedure    Confirm NPO Status    Cardiac Monitoring    CPAP or BiPAP As Indicated Per Respiratory Protocol    Insert Peripheral IV x2 - 20G - One in Each Arm    Have Patient Void Prior to Procedure.    No Telemetry Pads or Tape Over Implantation Site    Insert Peripheral IV    Saline Lock & Maintain IV Access    sodium chloride 0.9 % flush 10 mL    sodium chloride 0.9 % flush 10 mL    sodium chloride 0.9 % infusion 40 mL    neomycin-polymyxin B (NEOSPORIN-) 1 mL in sodium chloride (NS) 1,000 mL irrigation    vancomycin (VANCOCIN) 1,250 mg in sodium chloride 0.9 % 250 mL IVPB    CBC (No Diff)    Basic Metabolic Panel    Protime-INR      4. Bradycardia  Case Request Cath Lab: Pacemaker DC new    Obtain Informed Consent    Record Actual Height & Weight Prior to Procedure    Confirm NPO Status    Cardiac Monitoring    CPAP or BiPAP As Indicated Per Respiratory Protocol    Insert Peripheral IV x2 - 20G - One in Each Arm    Have Patient Void Prior to Procedure.    No Telemetry  Pads or Tape Over Implantation Site    Insert Peripheral IV    Saline Lock & Maintain IV Access    sodium chloride 0.9 % flush 10 mL    sodium chloride 0.9 % flush 10 mL    sodium chloride 0.9 % infusion 40 mL    neomycin-polymyxin B (NEOSPORIN-) 1 mL in sodium chloride (NS) 1,000 mL irrigation    vancomycin (VANCOCIN) 1,250 mg in sodium chloride 0.9 % 250 mL IVPB    CBC (No Diff)    Basic Metabolic Panel    Protime-INR      5. Paroxysmal atrial fibrillation  Case Request Cath Lab: Pacemaker DC new    Obtain Informed Consent    Record Actual Height & Weight Prior to Procedure    Confirm NPO Status    Cardiac Monitoring    CPAP or BiPAP As Indicated Per Respiratory Protocol    Insert Peripheral IV x2 - 20G - One in Each Arm    Have Patient Void Prior to Procedure.    No Telemetry Pads or Tape Over Implantation Site    Insert Peripheral IV    Saline Lock & Maintain IV Access    sodium chloride 0.9 % flush 10 mL    sodium chloride 0.9 % flush 10 mL    sodium chloride 0.9 % infusion 40 mL    neomycin-polymyxin B (NEOSPORIN-) 1 mL in sodium chloride (NS) 1,000 mL irrigation    vancomycin (VANCOCIN) 1,250 mg in sodium chloride 0.9 % 250 mL IVPB    CBC (No Diff)    Basic Metabolic Panel    Protime-INR      6. Presence of Watchman left atrial appendage closure device  Case Request Cath Lab: Pacemaker DC new    Obtain Informed Consent    Record Actual Height & Weight Prior to Procedure    Confirm NPO Status    Cardiac Monitoring    CPAP or BiPAP As Indicated Per Respiratory Protocol    Insert Peripheral IV x2 - 20G - One in Each Arm    Have Patient Void Prior to Procedure.    No Telemetry Pads or Tape Over Implantation Site    Insert Peripheral IV    Saline Lock & Maintain IV Access    sodium chloride 0.9 % flush 10 mL    sodium chloride 0.9 % flush 10 mL    sodium chloride 0.9 % infusion 40 mL    neomycin-polymyxin B (NEOSPORIN-) 1 mL in sodium chloride (NS) 1,000 mL irrigation    vancomycin (VANCOCIN) 1,250 mg in  sodium chloride 0.9 % 250 mL IVPB    CBC (No Diff)    Basic Metabolic Panel    Protime-INR      7. Essential hypertension  Case Request Cath Lab: Pacemaker DC new    Obtain Informed Consent    Record Actual Height & Weight Prior to Procedure    Confirm NPO Status    Cardiac Monitoring    CPAP or BiPAP As Indicated Per Respiratory Protocol    Insert Peripheral IV x2 - 20G - One in Each Arm    Have Patient Void Prior to Procedure.    No Telemetry Pads or Tape Over Implantation Site    Insert Peripheral IV    Saline Lock & Maintain IV Access    sodium chloride 0.9 % flush 10 mL    sodium chloride 0.9 % flush 10 mL    sodium chloride 0.9 % infusion 40 mL    neomycin-polymyxin B (NEOSPORIN-) 1 mL in sodium chloride (NS) 1,000 mL irrigation    vancomycin (VANCOCIN) 1,250 mg in sodium chloride 0.9 % 250 mL IVPB    CBC (No Diff)    Basic Metabolic Panel    Protime-INR      8. Obesity (BMI 30-39.9)  Case Request Cath Lab: Pacemaker DC new    Obtain Informed Consent    Record Actual Height & Weight Prior to Procedure    Confirm NPO Status    Cardiac Monitoring    CPAP or BiPAP As Indicated Per Respiratory Protocol    Insert Peripheral IV x2 - 20G - One in Each Arm    Have Patient Void Prior to Procedure.    No Telemetry Pads or Tape Over Implantation Site    Insert Peripheral IV    Saline Lock & Maintain IV Access    sodium chloride 0.9 % flush 10 mL    sodium chloride 0.9 % flush 10 mL    sodium chloride 0.9 % infusion 40 mL    neomycin-polymyxin B (NEOSPORIN-) 1 mL in sodium chloride (NS) 1,000 mL irrigation    vancomycin (VANCOCIN) 1,250 mg in sodium chloride 0.9 % 250 mL IVPB    CBC (No Diff)    Basic Metabolic Panel    Protime-INR      9. Dyslipidemia  Case Request Cath Lab: Pacemaker DC new    Obtain Informed Consent    Record Actual Height & Weight Prior to Procedure    Confirm NPO Status    Cardiac Monitoring    CPAP or BiPAP As Indicated Per Respiratory Protocol    Insert Peripheral IV x2 - 20G - One in Each Arm     Have Patient Void Prior to Procedure.    No Telemetry Pads or Tape Over Implantation Site    Insert Peripheral IV    Saline Lock & Maintain IV Access    sodium chloride 0.9 % flush 10 mL    sodium chloride 0.9 % flush 10 mL    sodium chloride 0.9 % infusion 40 mL    neomycin-polymyxin B (NEOSPORIN-) 1 mL in sodium chloride (NS) 1,000 mL irrigation    vancomycin (VANCOCIN) 1,250 mg in sodium chloride 0.9 % 250 mL IVPB    CBC (No Diff)    Basic Metabolic Panel    Protime-INR      10. Pulmonary hypertension  Case Request Cath Lab: Pacemaker DC new    Obtain Informed Consent    Record Actual Height & Weight Prior to Procedure    Confirm NPO Status    Cardiac Monitoring    CPAP or BiPAP As Indicated Per Respiratory Protocol    Insert Peripheral IV x2 - 20G - One in Each Arm    Have Patient Void Prior to Procedure.    No Telemetry Pads or Tape Over Implantation Site    Insert Peripheral IV    Saline Lock & Maintain IV Access    sodium chloride 0.9 % flush 10 mL    sodium chloride 0.9 % flush 10 mL    sodium chloride 0.9 % infusion 40 mL    neomycin-polymyxin B (NEOSPORIN-) 1 mL in sodium chloride (NS) 1,000 mL irrigation    vancomycin (VANCOCIN) 1,250 mg in sodium chloride 0.9 % 250 mL IVPB    CBC (No Diff)    Basic Metabolic Panel    Protime-INR      11. Nonrheumatic mitral valve regurgitation  Case Request Cath Lab: Pacemaker DC new    Obtain Informed Consent    Record Actual Height & Weight Prior to Procedure    Confirm NPO Status    Cardiac Monitoring    CPAP or BiPAP As Indicated Per Respiratory Protocol    Insert Peripheral IV x2 - 20G - One in Each Arm    Have Patient Void Prior to Procedure.    No Telemetry Pads or Tape Over Implantation Site    Insert Peripheral IV    Saline Lock & Maintain IV Access    sodium chloride 0.9 % flush 10 mL    sodium chloride 0.9 % flush 10 mL    sodium chloride 0.9 % infusion 40 mL    neomycin-polymyxin B (NEOSPORIN-) 1 mL in sodium chloride (NS) 1,000 mL irrigation     vancomycin (VANCOCIN) 1,250 mg in sodium chloride 0.9 % 250 mL IVPB    CBC (No Diff)    Basic Metabolic Panel    Protime-INR      12. CAD S/P percutaneous coronary angioplasty  Case Request Cath Lab: Pacemaker DC new    Obtain Informed Consent    Record Actual Height & Weight Prior to Procedure    Confirm NPO Status    Cardiac Monitoring    CPAP or BiPAP As Indicated Per Respiratory Protocol    Insert Peripheral IV x2 - 20G - One in Each Arm    Have Patient Void Prior to Procedure.    No Telemetry Pads or Tape Over Implantation Site    Insert Peripheral IV    Saline Lock & Maintain IV Access    sodium chloride 0.9 % flush 10 mL    sodium chloride 0.9 % flush 10 mL    sodium chloride 0.9 % infusion 40 mL    neomycin-polymyxin B (NEOSPORIN-) 1 mL in sodium chloride (NS) 1,000 mL irrigation    vancomycin (VANCOCIN) 1,250 mg in sodium chloride 0.9 % 250 mL IVPB    CBC (No Diff)    Basic Metabolic Panel    Protime-INR             Plan:               Past Medical History:  Past Medical History:   Diagnosis Date   • Atrial fibrillation    • Coronary artery disease    • Dyslipidemia    • Hypertension    • Hypothyroidism    • ILD (interstitial lung disease)    • Lung fibrosis    • Lupus    • Lupus    • Raynaud's disease      Past Surgical History:  Past Surgical History:   Procedure Laterality Date   • ATRIAL APPENDAGE EXCLUSION LEFT WITH TRANSESOPHAGEAL ECHOCARDIOGRAM N/A 8/19/2021    Procedure: Atrial Appendage Occlusion;  Surgeon: Eric Low MD;  Location: [facility] CATH INVASIVE LOCATION;  Service: Cardiovascular;  Laterality: N/A;   • ATRIAL APPENDAGE EXCLUSION LEFT WITH TRANSESOPHAGEAL ECHOCARDIOGRAM N/A 8/19/2021    Procedure: Atrial Appendage Occlusion;  Surgeon: Francisco Garza MD;  Location: [facility] CATH INVASIVE LOCATION;  Service: Cardiovascular;  Laterality: N/A;   • CARDIAC CATHETERIZATION     • CATARACT EXTRACTION     • CHOLECYSTECTOMY     • ENDOSCOPY N/A 8/20/2021    Procedure:  ESOPHAGOGASTRODUODENOSCOPY;  Surgeon: Joao Cross MD;  Location: [facility] ENDOSCOPY;  Service: Gastroenterology;  Laterality: N/A;  submucosal ecchymosis 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   • HYSTERECTOMY     • REPLACEMENT TOTAL KNEE BILATERAL        Allergies:  Allergies   Allergen Reactions   • Pravastatin Rash     rash     • Vancomycin Hives     Home Meds:  Current Meds:     Current Outpatient Medications:   •  albuterol sulfate  (90 Base) MCG/ACT inhaler, albuterol sulfate HFA 90 mcg/actuation aerosol inhaler  2 PUFFS EVERY 6 HOURS AS NEEDED FOR SHORTNESS OF BREATH/WHEEZING, Disp: , Rfl:   •  cyanocobalamin 1000 MCG/ML injection, Inject 1 mL into the appropriate muscle as directed by prescriber Every 30 (Thirty) Days., Disp: , Rfl: 4  •  Fluticasone-Umeclidin-Vilant (Trelegy Ellipta) 200-62.5-25 MCG/INH inhaler, Inhale 1 puff Daily., Disp: 1 each, Rfl: 0  •  folic acid (FOLVITE) 800 MCG tablet, Take 1 tablet by mouth Daily. 800mcg daily, Disp: , Rfl:   •  hydroxychloroquine (PLAQUENIL) 200 MG tablet, Take 1 tablet by mouth Daily., Disp: , Rfl:   •  isosorbide mononitrate (IMDUR) 30 MG 24 hr tablet, Take 1 tablet by mouth Every Morning., Disp: 30 tablet, Rfl: 11  •  levothyroxine (SYNTHROID, LEVOTHROID) 137 MCG tablet, Take 1 tablet by mouth Daily., Disp: , Rfl:   •  mycophenolate (CELLCEPT) 500 MG tablet, Take  by mouth 2 (Two) Times a Day., Disp: , Rfl:   •  nitroglycerin (Nitrostat) 0.4 MG SL tablet, Place 1 tablet under the tongue Every 5 (Five) Minutes As Needed for Chest Pain. Take no more than 3 doses in 15 minutes., Disp: 30 tablet, Rfl: 12  •  PARoxetine (PAXIL) 30 MG tablet, Take 1 tablet by mouth Daily., Disp: , Rfl:   •  rosuvastatin (CRESTOR) 5 MG tablet, Take 1 tablet by mouth Daily., Disp: , Rfl:   •  amLODIPine (NORVASC) 10 MG tablet, Take 10 mg by mouth Daily. (Patient not taking: Reported on 4/4/2023), Disp: , Rfl:   •  bisoprolol (ZEBeta) 10 MG tablet, Take 0.5  "tablets by mouth Daily. (Patient not taking: Reported on 4/4/2023), Disp: 90 tablet, Rfl: 3  •  buPROPion XL (WELLBUTRIN XL) 300 MG 24 hr tablet, Take 1 tablet by mouth Daily. (Patient not taking: Reported on 4/4/2023), Disp: , Rfl:     Current Facility-Administered Medications:   •  sodium chloride 0.9 % infusion 40 mL, 40 mL, Intravenous, PRN, Eric Low MD  Social History:   Social History     Tobacco Use   • Smoking status: Never   • Smokeless tobacco: Never   Substance Use Topics   • Alcohol use: Not Currently      Family History:  Family History   Problem Relation Age of Onset   • No Known Problems Mother    • Lung disease Father               Review of Systems   Cardiovascular: Negative for chest pain, leg swelling and palpitations.   Respiratory: Positive for shortness of breath.    Neurological: Positive for dizziness and numbness.     All other systems are negative         Objective:     Physical Exam  /71 (BP Location: Left arm, Patient Position: Sitting, Cuff Size: Large Adult)   Pulse 50   Ht 167.6 cm (66\")   Wt 86.2 kg (190 lb)   SpO2 97%   BMI 30.67 kg/m²   General:  Appears in no acute distress  Eyes: Sclera is anicteric,  conjunctiva is clear   HEENT:  No JVD.  No carotid bruits  Respiratory: Respirations regular and unlabored at rest.  Clear to auscultation  Cardiovascular: S1,S2 Regular rate and rhythm. No murmur, rub or gallop auscultated.   Extremities: No digital clubbing or cyanosis, no edema  Skin: Color pink. Skin warm and dry to touch. No rashes  No xanthoma  Neuro: Alert and awake.    Lab Reviewed:         Eric Low MD  4/4/2023 10:36 EDT      EMR Dragon/Transcription:   \"Dictated utilizing Dragon dictation\".        "

## 2023-04-04 ENCOUNTER — OFFICE VISIT (OUTPATIENT)
Dept: CARDIOLOGY | Facility: CLINIC | Age: 78
End: 2023-04-04
Payer: MEDICARE

## 2023-04-04 VITALS
HEART RATE: 50 BPM | OXYGEN SATURATION: 97 % | HEIGHT: 66 IN | DIASTOLIC BLOOD PRESSURE: 71 MMHG | WEIGHT: 190 LBS | SYSTOLIC BLOOD PRESSURE: 118 MMHG | BODY MASS INDEX: 30.53 KG/M2

## 2023-04-04 DIAGNOSIS — Z95.818 PRESENCE OF WATCHMAN LEFT ATRIAL APPENDAGE CLOSURE DEVICE: ICD-10-CM

## 2023-04-04 DIAGNOSIS — I34.0 NONRHEUMATIC MITRAL VALVE REGURGITATION: ICD-10-CM

## 2023-04-04 DIAGNOSIS — R42 DIZZINESS: ICD-10-CM

## 2023-04-04 DIAGNOSIS — E66.9 OBESITY (BMI 30-39.9): ICD-10-CM

## 2023-04-04 DIAGNOSIS — R00.1 BRADYCARDIA: ICD-10-CM

## 2023-04-04 DIAGNOSIS — R55 NEAR SYNCOPE: ICD-10-CM

## 2023-04-04 DIAGNOSIS — I49.5 SICK SINUS SYNDROME: Primary | ICD-10-CM

## 2023-04-04 DIAGNOSIS — Z98.61 CAD S/P PERCUTANEOUS CORONARY ANGIOPLASTY: ICD-10-CM

## 2023-04-04 DIAGNOSIS — I48.0 PAROXYSMAL ATRIAL FIBRILLATION: ICD-10-CM

## 2023-04-04 DIAGNOSIS — I25.10 CAD S/P PERCUTANEOUS CORONARY ANGIOPLASTY: ICD-10-CM

## 2023-04-04 DIAGNOSIS — I27.20 PULMONARY HYPERTENSION: ICD-10-CM

## 2023-04-04 DIAGNOSIS — I10 ESSENTIAL HYPERTENSION: ICD-10-CM

## 2023-04-04 DIAGNOSIS — E78.5 DYSLIPIDEMIA: ICD-10-CM

## 2023-04-04 PROCEDURE — 99214 OFFICE O/P EST MOD 30 MIN: CPT | Performed by: INTERNAL MEDICINE

## 2023-04-04 PROCEDURE — 93000 ELECTROCARDIOGRAM COMPLETE: CPT | Performed by: INTERNAL MEDICINE

## 2023-04-04 PROCEDURE — 3078F DIAST BP <80 MM HG: CPT | Performed by: INTERNAL MEDICINE

## 2023-04-04 PROCEDURE — 3074F SYST BP LT 130 MM HG: CPT | Performed by: INTERNAL MEDICINE

## 2023-04-04 RX ORDER — SODIUM CHLORIDE 9 MG/ML
40 INJECTION, SOLUTION INTRAVENOUS AS NEEDED
Status: DISCONTINUED | OUTPATIENT
Start: 2023-04-04 | End: 2023-04-12 | Stop reason: HOSPADM

## 2023-04-04 RX ORDER — SODIUM CHLORIDE 0.9 % (FLUSH) 0.9 %
10 SYRINGE (ML) INJECTION AS NEEDED
Status: CANCELLED | OUTPATIENT
Start: 2023-04-04

## 2023-04-04 RX ORDER — SODIUM CHLORIDE 0.9 % (FLUSH) 0.9 %
10 SYRINGE (ML) INJECTION EVERY 12 HOURS SCHEDULED
Status: CANCELLED | OUTPATIENT
Start: 2023-04-04

## 2023-04-11 ENCOUNTER — APPOINTMENT (OUTPATIENT)
Dept: GENERAL RADIOLOGY | Facility: HOSPITAL | Age: 78
End: 2023-04-11
Payer: MEDICARE

## 2023-04-11 ENCOUNTER — HOSPITAL ENCOUNTER (OUTPATIENT)
Facility: HOSPITAL | Age: 78
Discharge: HOME | End: 2023-04-12
Attending: INTERNAL MEDICINE | Admitting: INTERNAL MEDICINE
Payer: MEDICARE

## 2023-04-11 DIAGNOSIS — R55 NEAR SYNCOPE: ICD-10-CM

## 2023-04-11 DIAGNOSIS — R00.1 BRADYCARDIA: ICD-10-CM

## 2023-04-11 DIAGNOSIS — I25.10 CAD S/P PERCUTANEOUS CORONARY ANGIOPLASTY: ICD-10-CM

## 2023-04-11 DIAGNOSIS — I27.20 PULMONARY HYPERTENSION: ICD-10-CM

## 2023-04-11 DIAGNOSIS — I49.5 SICK SINUS SYNDROME: ICD-10-CM

## 2023-04-11 DIAGNOSIS — Z95.818 PRESENCE OF WATCHMAN LEFT ATRIAL APPENDAGE CLOSURE DEVICE: ICD-10-CM

## 2023-04-11 DIAGNOSIS — E66.9 OBESITY (BMI 30-39.9): ICD-10-CM

## 2023-04-11 DIAGNOSIS — Z98.61 CAD S/P PERCUTANEOUS CORONARY ANGIOPLASTY: ICD-10-CM

## 2023-04-11 DIAGNOSIS — I10 ESSENTIAL HYPERTENSION: ICD-10-CM

## 2023-04-11 DIAGNOSIS — I34.0 NONRHEUMATIC MITRAL VALVE REGURGITATION: ICD-10-CM

## 2023-04-11 DIAGNOSIS — R42 DIZZINESS: ICD-10-CM

## 2023-04-11 DIAGNOSIS — I48.0 PAROXYSMAL ATRIAL FIBRILLATION: ICD-10-CM

## 2023-04-11 DIAGNOSIS — E78.5 DYSLIPIDEMIA: ICD-10-CM

## 2023-04-11 LAB
ANION GAP SERPL CALCULATED.3IONS-SCNC: 8 MMOL/L (ref 5–15)
BUN SERPL-MCNC: 15 MG/DL (ref 8–23)
BUN/CREAT SERPL: 16.3 (ref 7–25)
CALCIUM SPEC-SCNC: 9.2 MG/DL (ref 8.6–10.5)
CHLORIDE SERPL-SCNC: 101 MMOL/L (ref 98–107)
CO2 SERPL-SCNC: 31 MMOL/L (ref 22–29)
CREAT SERPL-MCNC: 0.92 MG/DL (ref 0.57–1)
DEPRECATED RDW RBC AUTO: 49.4 FL (ref 37–54)
EGFRCR SERPLBLD CKD-EPI 2021: 63.9 ML/MIN/1.73
ERYTHROCYTE [DISTWIDTH] IN BLOOD BY AUTOMATED COUNT: 14.3 % (ref 12.3–15.4)
GLUCOSE SERPL-MCNC: 94 MG/DL (ref 65–99)
HCT VFR BLD AUTO: 39.7 % (ref 34–46.6)
HGB BLD-MCNC: 13.2 G/DL (ref 12–15.9)
INR PPP: 1.11 (ref 0.93–1.1)
MCH RBC QN AUTO: 31.2 PG (ref 26.6–33)
MCHC RBC AUTO-ENTMCNC: 33.3 G/DL (ref 31.5–35.7)
MCV RBC AUTO: 93.7 FL (ref 79–97)
PLATELET # BLD AUTO: 75 10*3/MM3 (ref 140–450)
PMV BLD AUTO: 10.5 FL (ref 6–12)
POTASSIUM SERPL-SCNC: 4.1 MMOL/L (ref 3.5–5.2)
PROTHROMBIN TIME: 11.4 SECONDS (ref 9.6–11.7)
RBC # BLD AUTO: 4.23 10*6/MM3 (ref 3.77–5.28)
SODIUM SERPL-SCNC: 140 MMOL/L (ref 136–145)
WBC NRBC COR # BLD: 4.3 10*3/MM3 (ref 3.4–10.8)

## 2023-04-11 PROCEDURE — 33208 INSRT HEART PM ATRIAL & VENT: CPT | Performed by: INTERNAL MEDICINE

## 2023-04-11 PROCEDURE — 96360 HYDRATION IV INFUSION INIT: CPT | Performed by: INTERNAL MEDICINE

## 2023-04-11 PROCEDURE — C1894 INTRO/SHEATH, NON-LASER: HCPCS | Performed by: INTERNAL MEDICINE

## 2023-04-11 PROCEDURE — 25010000002 FENTANYL CITRATE (PF) 50 MCG/ML SOLUTION: Performed by: INTERNAL MEDICINE

## 2023-04-11 PROCEDURE — 25010000002 CEFAZOLIN PER 500 MG: Performed by: INTERNAL MEDICINE

## 2023-04-11 PROCEDURE — 80048 BASIC METABOLIC PNL TOTAL CA: CPT | Performed by: INTERNAL MEDICINE

## 2023-04-11 PROCEDURE — 99152 MOD SED SAME PHYS/QHP 5/>YRS: CPT | Performed by: INTERNAL MEDICINE

## 2023-04-11 PROCEDURE — 85027 COMPLETE CBC AUTOMATED: CPT | Performed by: INTERNAL MEDICINE

## 2023-04-11 PROCEDURE — 36415 COLL VENOUS BLD VENIPUNCTURE: CPT | Performed by: INTERNAL MEDICINE

## 2023-04-11 PROCEDURE — C1785 PMKR, DUAL, RATE-RESP: HCPCS | Performed by: INTERNAL MEDICINE

## 2023-04-11 PROCEDURE — 25010000002 MIDAZOLAM PER 1 MG: Performed by: INTERNAL MEDICINE

## 2023-04-11 PROCEDURE — 85610 PROTHROMBIN TIME: CPT | Performed by: INTERNAL MEDICINE

## 2023-04-11 PROCEDURE — G0378 HOSPITAL OBSERVATION PER HR: HCPCS

## 2023-04-11 PROCEDURE — C1898 LEAD, PMKR, OTHER THAN TRANS: HCPCS | Performed by: INTERNAL MEDICINE

## 2023-04-11 PROCEDURE — 99153 MOD SED SAME PHYS/QHP EA: CPT | Performed by: INTERNAL MEDICINE

## 2023-04-11 PROCEDURE — 25010000002 HYDROMORPHONE 1 MG/ML SOLUTION: Performed by: INTERNAL MEDICINE

## 2023-04-11 PROCEDURE — 25510000001 IOPAMIDOL PER 1 ML: Performed by: INTERNAL MEDICINE

## 2023-04-11 PROCEDURE — 71045 X-RAY EXAM CHEST 1 VIEW: CPT

## 2023-04-11 DEVICE — GEN PM ASSURITY MRI DR RF PM2272: Type: IMPLANTABLE DEVICE | Status: FUNCTIONAL

## 2023-04-11 DEVICE — LD PM TENDRIL STS 6F52CM 2088TC52: Type: IMPLANTABLE DEVICE | Status: FUNCTIONAL

## 2023-04-11 DEVICE — LD PM TENDRIL STS 6F46CM 2088TC46: Type: IMPLANTABLE DEVICE | Status: FUNCTIONAL

## 2023-04-11 RX ORDER — FENTANYL CITRATE 50 UG/ML
INJECTION, SOLUTION INTRAMUSCULAR; INTRAVENOUS
Status: DISCONTINUED | OUTPATIENT
Start: 2023-04-11 | End: 2023-04-11 | Stop reason: HOSPADM

## 2023-04-11 RX ORDER — SODIUM CHLORIDE 9 MG/ML
40 INJECTION, SOLUTION INTRAVENOUS AS NEEDED
Status: DISCONTINUED | OUTPATIENT
Start: 2023-04-11 | End: 2023-04-12 | Stop reason: HOSPADM

## 2023-04-11 RX ORDER — SODIUM CHLORIDE 0.9 % (FLUSH) 0.9 %
3 SYRINGE (ML) INJECTION EVERY 12 HOURS SCHEDULED
Status: DISCONTINUED | OUTPATIENT
Start: 2023-04-11 | End: 2023-04-12 | Stop reason: HOSPADM

## 2023-04-11 RX ORDER — BISOPROLOL FUMARATE 5 MG/1
5 TABLET, FILM COATED ORAL DAILY
Status: DISCONTINUED | OUTPATIENT
Start: 2023-04-11 | End: 2023-04-11

## 2023-04-11 RX ORDER — SODIUM CHLORIDE 0.9 % (FLUSH) 0.9 %
10 SYRINGE (ML) INJECTION EVERY 12 HOURS SCHEDULED
Status: DISCONTINUED | OUTPATIENT
Start: 2023-04-11 | End: 2023-04-11

## 2023-04-11 RX ORDER — SODIUM CHLORIDE 0.9 % (FLUSH) 0.9 %
3-10 SYRINGE (ML) INJECTION AS NEEDED
Status: DISCONTINUED | OUTPATIENT
Start: 2023-04-11 | End: 2023-04-12 | Stop reason: HOSPADM

## 2023-04-11 RX ORDER — MIDAZOLAM HYDROCHLORIDE 1 MG/ML
INJECTION INTRAMUSCULAR; INTRAVENOUS
Status: DISCONTINUED | OUTPATIENT
Start: 2023-04-11 | End: 2023-04-11 | Stop reason: HOSPADM

## 2023-04-11 RX ORDER — SODIUM CHLORIDE 0.9 % (FLUSH) 0.9 %
10 SYRINGE (ML) INJECTION AS NEEDED
Status: DISCONTINUED | OUTPATIENT
Start: 2023-04-11 | End: 2023-04-11

## 2023-04-11 RX ORDER — NALOXONE HCL 0.4 MG/ML
0.4 VIAL (ML) INJECTION
Status: DISCONTINUED | OUTPATIENT
Start: 2023-04-11 | End: 2023-04-12 | Stop reason: HOSPADM

## 2023-04-11 RX ORDER — BUPROPION HYDROCHLORIDE 150 MG/1
300 TABLET ORAL DAILY
Status: DISCONTINUED | OUTPATIENT
Start: 2023-04-11 | End: 2023-04-11

## 2023-04-11 RX ORDER — AMLODIPINE BESYLATE 5 MG/1
10 TABLET ORAL DAILY
Status: DISCONTINUED | OUTPATIENT
Start: 2023-04-11 | End: 2023-04-11

## 2023-04-11 RX ORDER — ROSUVASTATIN CALCIUM 5 MG/1
5 TABLET, COATED ORAL NIGHTLY
Status: DISCONTINUED | OUTPATIENT
Start: 2023-04-11 | End: 2023-04-12 | Stop reason: HOSPADM

## 2023-04-11 RX ORDER — ALBUTEROL SULFATE 2.5 MG/3ML
2.5 SOLUTION RESPIRATORY (INHALATION) EVERY 4 HOURS PRN
Status: DISCONTINUED | OUTPATIENT
Start: 2023-04-11 | End: 2023-04-12 | Stop reason: HOSPADM

## 2023-04-11 RX ADMIN — ROSUVASTATIN CALCIUM 5 MG: 5 TABLET, COATED ORAL at 20:35

## 2023-04-11 RX ADMIN — CEFAZOLIN SODIUM 1 G: 1 INJECTION, POWDER, FOR SOLUTION INTRAMUSCULAR; INTRAVENOUS at 13:05

## 2023-04-11 RX ADMIN — HYDROMORPHONE HYDROCHLORIDE 0.5 MG: 1 INJECTION, SOLUTION INTRAMUSCULAR; INTRAVENOUS; SUBCUTANEOUS at 20:48

## 2023-04-11 RX ADMIN — PAROXETINE 30 MG: 10 TABLET, FILM COATED ORAL at 18:01

## 2023-04-11 RX ADMIN — Medication 3 ML: at 20:42

## 2023-04-11 RX ADMIN — CEFAZOLIN 2 G: 2 INJECTION, POWDER, FOR SOLUTION INTRAMUSCULAR; INTRAVENOUS at 20:35

## 2023-04-11 NOTE — INTERVAL H&P NOTE
H&P updated. The patient was examined and the following changes are noted:    Patient with symptomatic bradycardia and sick sinus syndrome here for permanent pacemaker implantation.  Shared decision making was done with patient.  Risk benefits alternatives explained.

## 2023-04-11 NOTE — Clinical Note
Physician notified by staff. 11-13    135  |  107  |  23  ----------------------------<  235<H>  4.7   |  25  |  0.73    Ca    9.2      13 Nov 2022 10:07    TPro  7.7  /  Alb  1.3<L>  /  TBili  1.0  /  DBili  x   /  AST  38<H>  /  ALT  14  /  AlkPhos  250<H>  11-13  POCT Blood Glucose.: 130 mg/dL (11-14-22 @ 08:13)

## 2023-04-12 VITALS
DIASTOLIC BLOOD PRESSURE: 59 MMHG | TEMPERATURE: 97.5 F | WEIGHT: 198.19 LBS | SYSTOLIC BLOOD PRESSURE: 115 MMHG | BODY MASS INDEX: 35.12 KG/M2 | HEIGHT: 63 IN | RESPIRATION RATE: 18 BRPM | OXYGEN SATURATION: 96 % | HEART RATE: 63 BPM

## 2023-04-12 LAB
ANION GAP SERPL CALCULATED.3IONS-SCNC: 8 MMOL/L (ref 5–15)
BASOPHILS # BLD AUTO: 0 10*3/MM3 (ref 0–0.2)
BASOPHILS NFR BLD AUTO: 0.2 % (ref 0–1.5)
BUN SERPL-MCNC: 12 MG/DL (ref 8–23)
BUN/CREAT SERPL: 13 (ref 7–25)
CALCIUM SPEC-SCNC: 8.8 MG/DL (ref 8.6–10.5)
CHLORIDE SERPL-SCNC: 101 MMOL/L (ref 98–107)
CO2 SERPL-SCNC: 28 MMOL/L (ref 22–29)
CREAT SERPL-MCNC: 0.92 MG/DL (ref 0.57–1)
DEPRECATED RDW RBC AUTO: 49.4 FL (ref 37–54)
EGFRCR SERPLBLD CKD-EPI 2021: 63.9 ML/MIN/1.73
EOSINOPHIL # BLD AUTO: 0.1 10*3/MM3 (ref 0–0.4)
EOSINOPHIL NFR BLD AUTO: 2.6 % (ref 0.3–6.2)
ERYTHROCYTE [DISTWIDTH] IN BLOOD BY AUTOMATED COUNT: 14.3 % (ref 12.3–15.4)
GLUCOSE SERPL-MCNC: 101 MG/DL (ref 65–99)
HCT VFR BLD AUTO: 39.8 % (ref 34–46.6)
HGB BLD-MCNC: 13.6 G/DL (ref 12–15.9)
LYMPHOCYTES # BLD AUTO: 0.3 10*3/MM3 (ref 0.7–3.1)
LYMPHOCYTES NFR BLD AUTO: 7.4 % (ref 19.6–45.3)
MCH RBC QN AUTO: 32 PG (ref 26.6–33)
MCHC RBC AUTO-ENTMCNC: 34.2 G/DL (ref 31.5–35.7)
MCV RBC AUTO: 93.7 FL (ref 79–97)
MONOCYTES # BLD AUTO: 0.3 10*3/MM3 (ref 0.1–0.9)
MONOCYTES NFR BLD AUTO: 5.7 % (ref 5–12)
NEUTROPHILS NFR BLD AUTO: 3.9 10*3/MM3 (ref 1.7–7)
NEUTROPHILS NFR BLD AUTO: 84.1 % (ref 42.7–76)
NRBC BLD AUTO-RTO: 0.2 /100 WBC (ref 0–0.2)
PLATELET # BLD AUTO: 70 10*3/MM3 (ref 140–450)
PMV BLD AUTO: 10.6 FL (ref 6–12)
POTASSIUM SERPL-SCNC: 3.9 MMOL/L (ref 3.5–5.2)
RBC # BLD AUTO: 4.25 10*6/MM3 (ref 3.77–5.28)
SODIUM SERPL-SCNC: 137 MMOL/L (ref 136–145)
WBC NRBC COR # BLD: 4.6 10*3/MM3 (ref 3.4–10.8)

## 2023-04-12 PROCEDURE — 80048 BASIC METABOLIC PNL TOTAL CA: CPT | Performed by: INTERNAL MEDICINE

## 2023-04-12 PROCEDURE — 93005 ELECTROCARDIOGRAM TRACING: CPT | Performed by: INTERNAL MEDICINE

## 2023-04-12 PROCEDURE — 85025 COMPLETE CBC W/AUTO DIFF WBC: CPT | Performed by: INTERNAL MEDICINE

## 2023-04-12 PROCEDURE — 25010000002 CEFAZOLIN PER 500 MG: Performed by: INTERNAL MEDICINE

## 2023-04-12 PROCEDURE — G0378 HOSPITAL OBSERVATION PER HR: HCPCS

## 2023-04-12 RX ADMIN — PAROXETINE 30 MG: 10 TABLET, FILM COATED ORAL at 08:55

## 2023-04-12 RX ADMIN — CEFAZOLIN 2 G: 2 INJECTION, POWDER, FOR SOLUTION INTRAMUSCULAR; INTRAVENOUS at 05:31

## 2023-04-12 RX ADMIN — LEVOTHYROXINE SODIUM 137 MCG: 0.11 TABLET ORAL at 05:31

## 2023-04-12 RX ADMIN — Medication 3 ML: at 08:56

## 2023-04-12 NOTE — CASE MANAGEMENT/SOCIAL WORK
Discharge Planning Assessment   [facility]     Patient Name: Ally Ivory  MRN: [id number]  Today's Date: 4/12/2023    Admit Date: 4/11/2023    Plan: DC Plan: Anticipate Routine Home with Family. Patient current with unknown company for Home Oxygen.   Discharge Needs Assessment     Row Name 04/12/23 1050       Living Environment    People in Home spouse    Name(s) of People in Home Emerson Ivory    Current Living Arrangements home    Potentially Unsafe Housing Conditions none    Primary Care Provided by self    Provides Primary Care For no one    Family Caregiver if Needed spouse    Family Caregiver Names Emerson Ivory. Children live next door    Quality of Family Relationships helpful;involved;supportive    Able to Return to Prior Arrangements yes       Resource/Environmental Concerns    Resource/Environmental Concerns none    Transportation Concerns none       Food Insecurity    Within the past 12 months, you worried that your food would run out before you got the money to buy more. Never true    Within the past 12 months, the food you bought just didn't last and you didn't have money to get more. Never true       Transition Planning    Patient/Family Anticipates Transition to home with family    Patient/Family Anticipated Services at Transition none    Transportation Anticipated family or friend will provide       Discharge Needs Assessment    Readmission Within the Last 30 Days no previous admission in last 30 days    Equipment Currently Used at Home walker, rolling;oxygen  unknown provider    Concerns to be Addressed denies needs/concerns at this time;no discharge needs identified    Anticipated Changes Related to Illness none    Equipment Needed After Discharge none    Provided Post Acute Provider List? N/A    Provided Post Acute Provider Quality & Resource List? N/A               Discharge Plan     Row Name 04/12/23 105       Plan    Plan DC Plan: Anticipate Routine Home with Family. Patient current with  unknown company for Home Oxygen.    Provided Post Acute Provider List? N/A    Provided Post Acute Provider Quality & Resource List? N/A    Plan Comments CM spoke with patient at bedside to discuss admission assessment and discharge planning. Patient confirms PCP and pharmacy. Patient admits to some difficulty affording medications at this time. Patient denies any additional needs for services or DME at this time. Discharge orders already placed prior to meds to bed assessment. Patient does state she has difficulty affording her inhaler. CM instructed patient to go to website of  and apply for financial aid to assist or call MD for possible samples or coupon cards. Patient verbalized understanding. Discharge orders in place. Patient will transport home with spouse via private vehicle. No barriers.           Expected Discharge Date and Time     Expected Discharge Date Expected Discharge Time    Apr 12, 2023          Demographic Summary     Row Name 04/12/23 1049       General Information    Admission Type observation    Arrived From emergency department;home    Required Notices Provided Observation Status Notice    Referral Source admission list    Reason for Consult discharge planning    Preferred Language English       Contact Information    Permission Granted to Share Info With                Functional Status     Row Name 04/12/23 1050       Functional Status    Usual Activity Tolerance good    Current Activity Tolerance good       Physical Activity    On average, how many days per week do you engage in moderate to strenuous exercise (like a brisk walk)? 0 days    On average, how many minutes do you engage in exercise at this level? 0 min    Number of minutes of exercise per week 0       Functional Status, IADL    Medications independent    Meal Preparation independent    Housekeeping independent    Laundry independent    Shopping independent       Mental Status    General Appearance WDL  WDL       Mental Status Summary    Recent Changes in Mental Status/Cognitive Functioning no changes       Employment/    Employment Status , previous service;retired    Current or Previous Occupation not applicable           Current or Previous  Service none              Met with patient in room wearing PPE: mask,     Maintain distance greater than six feet and spent less than fifteen minutes in the room.      Martha Escalera RN     Office Phone: [phone]  Office Cell:     [phone]

## 2023-04-12 NOTE — CASE MANAGEMENT/SOCIAL WORK
Case Management Discharge Note        Transportation Services  Private: Car (spouse)    Final Discharge Disposition Code: 01 - home or self-care

## 2023-04-12 NOTE — PLAN OF CARE
Patient discharged home per MD order. Patient shows no s/s of distress/discomfort. Reviewed patients swelling and incisional oozing with Neha YEUNG prior to discharge. Changed dressing to Pacer site per MD order and gave discharge instructions. Patient escorted by RN via wheelchair to family vehicle.

## 2023-04-12 NOTE — PLAN OF CARE
Goal Outcome Evaluation:           Progress: improving  Outcome Evaluation: sitting up in chair. vital signs stable

## 2023-04-12 NOTE — DISCHARGE SUMMARY
Date of Discharge:  4/12/2023    Discharge Diagnosis:   Active Hospital Problems    Diagnosis  POA   • **Bradycardia [R00.1]  Unknown   • Sick sinus syndrome [I49.5]  Unknown   • Near syncope [R55]  Unknown   • Pulmonary hypertension [I27.20]  Unknown   • Hypertension [I10]  Unknown   • Obesity [E66.9]  Unknown   • Paroxysmal atrial fibrillation (HCC) [I48.0]  Unknown   • Dizziness [R42]  Unknown   • Presence of Watchman left atrial appendage closure device [Z95.818]  Unknown   • Mitral regurgitation [I34.0]  Unknown   • Dyslipidemia [E78.5]  Unknown   • Chronic coronary artery disease [I25.10]  Unknown      Resolved Hospital Problems   No resolved problems to display.       Presenting Problem/History of Present Illness  Sick sinus syndrome [I49.5]  Dizziness [R42]  Near syncope [R55]  Bradycardia [R00.1]  Paroxysmal atrial fibrillation [I48.0]  Presence of Watchman left atrial appendage closure device [Z95.818]  Essential hypertension [I10]  Obesity (BMI 30-39.9) [E66.9]  Dyslipidemia [E78.5]  Pulmonary hypertension [I27.20]  Nonrheumatic mitral valve regurgitation [I34.0]  CAD S/P percutaneous coronary angioplasty [I25.10, Z98.61]      Hospital Course  Patient is a 77 y.o. female presented elective dual chamber pacemaker insertion.  Patient was observed overnight without any complications.  Pacemaker site without any signs of infection, mild inflammation and bruising noted; staples intact.  Advised patient patient to use ice pack at home intermittently.  Patient to resume all home medications and restart bisoprolol 5mg daily.     Follow up in pacemaker clinic for wound check and staple removal with pacemaker nurse    Discussed post pacemaker discharge instructions.  Pacemaker was interrogated and working well.         Procedures Performed  Procedure(s):  Pacemaker DC new  Abbott aware       Consults:   Consults     No orders found for last 30 day(s).          Pertinent Test Results: bmp unremarkable CBC with  platelets 70 (was 75) and has been chronically low         Ejection Fraction  Lab Results   Component Value Date    EF 66 08/05/2022       Echo EF Estimated  No results found for: ECHOEFEST    Nuclear Stress Ejection Fraction  No components found for: NUCEF    Cath Ejection Fraction Quantitative  No results found for: CATHEF    Condition on Discharge: Stable    Physical Exam at Discharge :    Vital Signs  Temp:  [97.5 °F (36.4 °C)-98.5 °F (36.9 °C)] 97.5 °F (36.4 °C)  Heart Rate:  [60-74] 63  Resp:  [12-22] 18  BP: (114-167)/(48-95) 115/59  General:  Appears in no acute distress  Eyes: Sclera is anicteric,  conjunctiva is clear   HEENT:  No JVD. Thyroid not visibly enlarged. No mucosal pallor or cyanosis  Respiratory: Respirations regular and unlabored at rest.  CTA  Cardiovascular: S1,S2 Regular rate and rhythm. No murmur, rub or gallop auscultated.  . No pretibial pitting edema; pacemaker site with inflammation and bruising, staples intact.   Gastrointestinal: Abdomen nondistended, soft  Musculoskeletal:  No abnormal movements  Extremities: No digital clubbing or cyanosis  Skin: Color pink. Skin warm and dry to touch. No rashes  No xanthoma  Neuro: Alert and awake, no lateralizing deficits appreciated        Discharge Disposition Home  Home or Self Care    Discharge Medications     Discharge Medications      New Medications      Instructions Start Date   bisoprolol 10 MG tablet  Commonly known as: ZEBeta   5 mg, Oral, Daily         Continue These Medications      Instructions Start Date   albuterol sulfate  (90 Base) MCG/ACT inhaler  Commonly known as: PROVENTIL HFA;VENTOLIN HFA;PROAIR HFA   albuterol sulfate HFA 90 mcg/actuation aerosol inhaler   2 PUFFS EVERY 6 HOURS AS NEEDED FOR SHORTNESS OF BREATH/WHEEZING      cyanocobalamin 1000 MCG/ML injection   1,000 mcg, Intramuscular, Every 30 Days      folic acid 800 MCG tablet  Commonly known as: FOLVITE   800 mcg, Oral, Daily, 800mcg daily       hydroxychloroquine 200 MG tablet  Commonly known as: PLAQUENIL   200 mg, Oral, Daily      isosorbide mononitrate 30 MG 24 hr tablet  Commonly known as: IMDUR   30 mg, Oral, Every Morning      levothyroxine 137 MCG tablet  Commonly known as: SYNTHROID, LEVOTHROID   137 mcg, Oral, Daily      mycophenolate 500 MG tablet  Commonly known as: CELLCEPT   Oral, 2 Times Daily      nitroglycerin 0.4 MG SL tablet  Commonly known as: Nitrostat   0.4 mg, Sublingual, Every 5 Minutes PRN, Take no more than 3 doses in 15 minutes.      PARoxetine 30 MG tablet  Commonly known as: PAXIL   30 mg, Oral, Daily      rosuvastatin 5 MG tablet  Commonly known as: CRESTOR   5 mg, Oral, Daily      Trelegy Ellipta 200-62.5-25 MCG/ACT aerosol powder   Generic drug: Fluticasone-Umeclidin-Vilant   1 puff, Inhalation, Daily - RT         Stop These Medications    amLODIPine 10 MG tablet  Commonly known as: NORVASC     buPROPion  MG 24 hr tablet  Commonly known as: WELLBUTRIN XL            Discharge Diet: Cardiac diet    Activity at Discharge: Activity as tolerated    Follow-up Appointments: Follow-up with me in 1 month  Future Appointments   Date Time Provider Department Center   4/25/2023  1:00 PM BRITNI GUILLEN [place] DEVICE CHECK [facility]   5/1/2023 10:30 AM Becca Chan MD [facility] None   9/5/2023  9:30 AM Eric Low MD [facility]              ERVIN Clement  04/12/23  15:58 EDT    Time: Discharge Time Spent:30    Electronically signed by ERVIN Clement, 04/12/23, 3:59 PM EDT.

## 2023-04-13 ENCOUNTER — READMISSION MANAGEMENT (OUTPATIENT)
Dept: CALL CENTER | Facility: HOSPITAL | Age: 78
End: 2023-04-13
Payer: MEDICARE

## 2023-04-13 NOTE — OUTREACH NOTE
Prep Survey    Flowsheet Row Responses   [facility]? [facility]   Is LACE score < 7 ? No   Eligibility Readm Mgmt   Discharge diagnosis Bradycardia, sick sinus syndrome, s/p elective dual chamber pacemaker insertion   Does the patient have one of the following disease processes/diagnoses(primary or secondary)? General Surgery   Does the patient have Home health ordered? No   Is there a DME ordered? No   Prep survey completed? Yes          Lynn ROBLEDO - Registered Nurse

## 2023-04-17 ENCOUNTER — READMISSION MANAGEMENT (OUTPATIENT)
Dept: CALL CENTER | Facility: HOSPITAL | Age: 78
End: 2023-04-17
Payer: MEDICARE

## 2023-04-17 NOTE — OUTREACH NOTE
General Surgery Week 1 Survey    Flowsheet Row Responses   [facility] patient discharged from? [facility]   Does the patient have one of the following disease processes/diagnoses(primary or secondary)? General Surgery   Week 1 attempt successful? Yes   Call start time 0941   Call end time 0945   Discharge diagnosis Bradycardia, sick sinus syndrome, s/p elective dual chamber pacemaker insertion   Meds reviewed with patient/caregiver? Yes   Is the patient having any side effects they believe may be caused by any medication additions or changes? No   Does the patient have all medications related to this admission filled (includes all antibiotics, pain medications, etc.) Yes   Is the patient taking all medications as directed (includes completed medication regime)? Yes   Does the patient have a follow up appointment scheduled with their surgeon? Yes  [Surgery FU apt on 4/25/23]   Has the patient kept scheduled appointments due by today? N/A   Comments 5/1/23 Pulm clinic FU    Has home health visited the patient within 72 hours of discharge? N/A   Psychosocial issues? No   Did the patient receive a copy of their discharge instructions? Yes   Nursing interventions Reviewed instructions with patient   What is the patient's perception of their health status since discharge? Improving   Nursing interventions Nurse provided patient education   Is the patient /caregiver able to teach back basic post-op care? Take showers only when approved by MD-sponge bathe until then, Keep incision areas clean,dry and protected, No tub bath, swimming, or hot tub until instructed by MD, Lifting as instructed by MD in discharge instructions, Drive as instructed by MD in discharge instructions   Is the patient/caregiver able to teach back signs and symptoms of incisional infection? Increased redness, swelling or pain at the incisonal site, Increased drainage or bleeding, Incisional warmth, Pus or odor from incision, Fever   Is the  patient/caregiver able to teach back steps to recovery at home? Set small, achievable goals for return to baseline health, Make a list of questions for surgeon's appointment, Eat a well-balance diet, Rest and rebuild strength, gradually increase activity   If the patient is a current smoker, are they able to teach back resources for cessation? Not a smoker   Is the patient/caregiver able to teach back the hierarchy of who to call/visit for symptoms/problems? PCP, Specialist, Home health nurse, Urgent Care, ED, 911 Yes   Week 1 call completed? Yes   Graduated/Revoked comments Pt states no issues during call          Amelia H - Registered Nurse

## 2023-04-21 LAB — QT INTERVAL: 450 MS

## 2023-04-25 ENCOUNTER — CLINICAL SUPPORT NO REQUIREMENTS (OUTPATIENT)
Dept: CARDIOLOGY | Facility: CLINIC | Age: 78
End: 2023-04-25
Payer: MEDICARE

## 2023-04-25 DIAGNOSIS — Z95.0 PACEMAKER: ICD-10-CM

## 2023-04-25 DIAGNOSIS — R00.1 BRADYCARDIA: Chronic | ICD-10-CM

## 2023-04-25 DIAGNOSIS — I49.5 SICK SINUS SYNDROME: Primary | Chronic | ICD-10-CM

## 2023-04-25 NOTE — PROGRESS NOTES
Patient was here today s/p pacemaker placement. Interrogation of device is normal, leads stable.  Removed staples with no issues, incision is well approximated, no redness, swelling or drainage noted. Discussed left arm precautions, driving and showering. Discussed follow up care and apt with Dr Low as well.

## 2023-05-01 ENCOUNTER — OFFICE VISIT (OUTPATIENT)
Dept: PULMONOLOGY | Facility: HOSPITAL | Age: 78
End: 2023-05-01
Payer: MEDICARE

## 2023-05-01 VITALS
RESPIRATION RATE: 12 BRPM | SYSTOLIC BLOOD PRESSURE: 142 MMHG | OXYGEN SATURATION: 94 % | DIASTOLIC BLOOD PRESSURE: 84 MMHG | HEART RATE: 75 BPM | HEIGHT: 63 IN | WEIGHT: 199 LBS | BODY MASS INDEX: 35.26 KG/M2

## 2023-05-01 DIAGNOSIS — J96.11 CHRONIC RESPIRATORY FAILURE WITH HYPOXIA: ICD-10-CM

## 2023-05-01 DIAGNOSIS — J84.9 ILD (INTERSTITIAL LUNG DISEASE): Primary | ICD-10-CM

## 2023-05-01 DIAGNOSIS — G47.30 SLEEP APNEA, UNSPECIFIED TYPE: ICD-10-CM

## 2023-05-01 PROCEDURE — G0463 HOSPITAL OUTPT CLINIC VISIT: HCPCS

## 2023-05-01 RX ORDER — MYCOPHENOLATE MOFETIL 250 MG/1
CAPSULE ORAL
COMMUNITY

## 2023-05-01 RX ORDER — ROSUVASTATIN CALCIUM 10 MG/1
1 TABLET, COATED ORAL
COMMUNITY

## 2023-05-01 NOTE — PROGRESS NOTES
HPI:  F/u for shortness of breath and abnormal CT scan  Shortness of breath started years ago but was progressively getting worse until started on mycophenolate and since then the shortness of breath is partially improved and stable, also having minimal cough.  No fever or weight loss     Never smoked but exposed to secondhand smoking.  No indoor pets, no exposure to birds.  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     Father had chronic lung disease but probably from smoking pipe, no known history of interstitial lung disease    Past Medical History:   Diagnosis Date   • Atrial fibrillation    • Coronary artery disease    • Dyslipidemia    • Hypertension    • Hypothyroidism    • ILD (interstitial lung disease)    • Lung fibrosis    • Lupus    • Lupus    • Raynaud's disease         Current Outpatient Medications on File Prior to Visit   Medication Sig Dispense Refill   • albuterol sulfate  (90 Base) MCG/ACT inhaler albuterol sulfate HFA 90 mcg/actuation aerosol inhaler   2 PUFFS EVERY 6 HOURS AS NEEDED FOR SHORTNESS OF BREATH/WHEEZING     • bisoprolol (ZEBeta) 10 MG tablet Take 0.5 tablets by mouth Daily. 90 tablet 3   • cyanocobalamin 1000 MCG/ML injection Inject 1 mL into the appropriate muscle as directed by prescriber Every 30 (Thirty) Days.  4   • folic acid (FOLVITE) 800 MCG tablet Take 1 tablet by mouth Daily. 800mcg daily     • hydroxychloroquine (PLAQUENIL) 200 MG tablet Take 1 tablet by mouth Daily.     • isosorbide mononitrate (IMDUR) 30 MG 24 hr tablet Take 1 tablet by mouth Every Morning. 30 tablet 11   • levothyroxine (SYNTHROID, LEVOTHROID) 137 MCG tablet Take 1 tablet by mouth Daily.     • mycophenolate (CELLCEPT) 250 MG capsule mycophenolate mofetil 250 mg capsule   TAKE 1 CAPSULE BY MOUTH TWICE A DAY     • nitroglycerin (Nitrostat) 0.4 MG SL tablet Place 1 tablet under the tongue Every 5 (Five)  "Minutes As Needed for Chest Pain. Take no more than 3 doses in 15 minutes. 30 tablet 12   • PARoxetine (PAXIL) 30 MG tablet Take 1 tablet by mouth Daily.     • rosuvastatin (CRESTOR) 10 MG tablet Take 1 tablet by mouth every night at bedtime.     • Fluticasone-Umeclidin-Vilant (Trelegy Ellipta) 200-62.5-25 MCG/INH inhaler Inhale 1 puff Daily. (Patient not taking: Reported on 5/1/2023) 1 each 0   • [DISCONTINUED] mycophenolate (CELLCEPT) 500 MG tablet Take  by mouth 2 (Two) Times a Day.     • [DISCONTINUED] rosuvastatin (CRESTOR) 5 MG tablet Take 1 tablet by mouth Daily.       No current facility-administered medications on file prior to visit.        Social History     Tobacco Use   • Smoking status: Never     Passive exposure: Past   • Smokeless tobacco: Never   Vaping Use   • Vaping Use: Never used   Substance Use Topics   • Alcohol use: Not Currently   • Drug use: Never        Family History   Problem Relation Age of Onset   • No Known Problems Mother    • Lung disease Father         Review of system:  Constitutional: Negative for chills, fever and malaise/fatigue.   HENT: Negative.    Eyes: Negative.    Cardiovascular: Negative.    Respiratory: Positive for dry cough and mild exertional shortness of breath.    Skin: Negative.    Musculoskeletal: Negative.    Gastrointestinal: Negative.    Genitourinary: Negative.    Neurological: Negative.    Psychiatric/Behavioral: Negative.    Physical exam:  Blood pressure 142/84, pulse 75, resp. rate 12, height 158.8 cm (62.5\"), weight 90.3 kg (199 lb), SpO2 94 %.    General Appearance:  Alert   HEENT:  Normocephalic, without obvious abnormality, Conjunctiva/corneas clear,.   Nares normal, no drainage     Neck:  Supple, symmetrical, trachea midline. No JVD.  Lungs /Chest wall:   Bilateral dry basal rhonchi, respirations unlabored, symmetrical wall movement.     Heart:  Regular rate and rhythm, S1 S2 normal  Abdomen: Soft, non-tender, no masses, no organomegaly.  "   Extremities: No edema, no clubbing or cyanosis    XR Chest 1 View    Result Date: 4/11/2023  Impression: No acute cardiopulmonary abnormality. No pneumothorax status post pacemaker placement. Chronic changes of the chest. Electronically Signed: Romina Norton  4/11/2023 4:11 PM EDT  Workstation ID: [id number]     Results for orders placed during the hospital encounter of 10/04/21    Adult Transesophageal Echo (TON) W/ Cont if Necessary Per Protocol    Interpretation Summary  TON   procedure note    Procedure:  TON    Indication:   A. georgette bailey    Date of procedure  : 10/4/2021    :  Dr. Eric Low    Description of procedure:    Under conscious sedation given by anesthesiology and local anesthesia, a TON probe was advanced into the esophagus and into the stomach 2D, M-mode, color Doppler images were obtained..  Patient tolerated procedure well complications well.    Complications: none    Results:    Normal LV size and contractility LV contractility, EF of 60%  Normal RV size  Biatrial enlargement seen.  Left atrial appendage has watchman left atrial appendage occluder device seated well.  Aortic valve, mitral valve, tricuspid valve appears structurally normal, moderate aortic, mitral, tricuspid seen.  Elevated RV systolic pressures at 58 mmHg consistent with pulmonary hypertension seen  No vegetation seen  No pericardial effusion seen.  Proximal aorta appears normal in size.  Mild aortic plaque seen      Recommendations/plan:    Clinical correlation recommended        Assessment and plan:  Interstitial lung disease: Most likely related to connective tissue disease, it has progressed on CT scan from 2013 to August 2021 but no significant change with the latest CT scan June 2022 and the PFTs has been stable  History of lupus and rheumatoid arthritis: Currently on hydroxychloroquine and started on mycophenolate July 2022 by her rheumatologist   PFTs done  March 2023, FVC 68%, FEV1 78%, TLC 62%, DLCO  48%, August 2022 FVC 61%, FEV1 69%, TLC 73%, DLCO 44% which is not significantly changed from December 2021 shows FVC 55% improving to 69% after bronchodilators, FEV1 54% and improving to 76% after bronchodilators, TLC 77%, DLCO 47%  Was taking Trelegy 1 inhalation once a day: But she did not notice much difference on it so we will stop the inhaler and might resume a different brand that has generic in the future if needed to     Discussed with the patient that since there has not been significant progression in the fibrosis in the last year i.e. less than 10% then we will continue to monitor that with the expectation that immunomodulation with mycophenolate should stabilize the lung disease.     Pulmonary hypertension: TON 10/2021 Elevated RV systolic pressures at 58 mmHg consistent with pulmonary hypertension seen, moderate AI and  MR: can't use Sildenafil as long as she is using Imdur and with valvular heart disease Sildenafil is not the preferred treatment..  Patient was prescribed oxygen in August 2022 for hypoxemia: Advised to keep saturation 90-95%, patient is compliant and benefiting from therapy  CAD s/p Stent, currently on Imdur and followed by Dr Maranda ALEXANDRE Fib s/p  watchman,  hypertension, dyslipidemia, h/o bradycardia : improved after stopping BB.     Excessive daytime sleepiness: Possible ELIS, will check home sleep study        W/u in 3/15/2022 with no signs of active inflammation      We will plan to follow PFTs every 12 months and CT scan every year and if there is signs of progression we will consider antifibrotic's.                        Patient is advised to stay up-to-date on immunizations for flu, pneumococcal and COVID-19

## 2023-05-18 ENCOUNTER — HOSPITAL ENCOUNTER (OUTPATIENT)
Dept: SLEEP MEDICINE | Facility: HOSPITAL | Age: 78
Discharge: HOME | End: 2023-05-18
Admitting: INTERNAL MEDICINE
Payer: MEDICARE

## 2023-05-18 DIAGNOSIS — G47.30 SLEEP APNEA, UNSPECIFIED TYPE: ICD-10-CM

## 2023-05-18 PROCEDURE — 95806 SLEEP STUDY UNATT&RESP EFFT: CPT

## 2023-05-29 DIAGNOSIS — G47.30 SLEEP APNEA, UNSPECIFIED TYPE: Primary | ICD-10-CM

## 2023-09-05 ENCOUNTER — OFFICE VISIT (OUTPATIENT)
Dept: CARDIOLOGY | Facility: CLINIC | Age: 78
End: 2023-09-05
Payer: MEDICARE

## 2023-09-05 VITALS
WEIGHT: 193 LBS | DIASTOLIC BLOOD PRESSURE: 83 MMHG | HEIGHT: 62 IN | OXYGEN SATURATION: 96 % | BODY MASS INDEX: 35.51 KG/M2 | HEART RATE: 86 BPM | SYSTOLIC BLOOD PRESSURE: 137 MMHG

## 2023-09-05 DIAGNOSIS — I27.20 PULMONARY HYPERTENSION: ICD-10-CM

## 2023-09-05 DIAGNOSIS — E66.9 OBESITY (BMI 30-39.9): ICD-10-CM

## 2023-09-05 DIAGNOSIS — E78.5 DYSLIPIDEMIA: ICD-10-CM

## 2023-09-05 DIAGNOSIS — I48.0 PAROXYSMAL ATRIAL FIBRILLATION: ICD-10-CM

## 2023-09-05 DIAGNOSIS — Z98.61 CAD S/P PERCUTANEOUS CORONARY ANGIOPLASTY: ICD-10-CM

## 2023-09-05 DIAGNOSIS — Z95.0 PACEMAKER: Primary | ICD-10-CM

## 2023-09-05 DIAGNOSIS — I25.10 CAD S/P PERCUTANEOUS CORONARY ANGIOPLASTY: ICD-10-CM

## 2023-09-05 DIAGNOSIS — Z95.818 PRESENCE OF WATCHMAN LEFT ATRIAL APPENDAGE CLOSURE DEVICE: ICD-10-CM

## 2023-09-05 DIAGNOSIS — I10 ESSENTIAL HYPERTENSION: ICD-10-CM

## 2023-09-05 RX ORDER — ERGOCALCIFEROL 1.25 MG/1
50000 CAPSULE ORAL 3 TIMES WEEKLY
COMMUNITY
Start: 2023-06-18

## 2023-09-05 RX ORDER — BISOPROLOL FUMARATE 10 MG/1
5 TABLET, FILM COATED ORAL DAILY
Qty: 45 TABLET | Refills: 3 | Status: SHIPPED | OUTPATIENT
Start: 2023-09-05

## 2023-09-05 RX ORDER — ISOSORBIDE MONONITRATE 30 MG/1
30 TABLET, EXTENDED RELEASE ORAL EVERY MORNING
Qty: 90 TABLET | Refills: 3 | Status: SHIPPED | OUTPATIENT
Start: 2023-09-05

## 2023-09-05 RX ORDER — LEVOTHYROXINE SODIUM 0.15 MG/1
1 TABLET ORAL DAILY
COMMUNITY
Start: 2023-08-04

## 2023-09-05 RX ORDER — BUPROPION HYDROCHLORIDE 300 MG/1
300 TABLET ORAL DAILY
COMMUNITY
Start: 2023-06-27

## 2023-09-05 RX ORDER — ROSUVASTATIN CALCIUM 10 MG/1
10 TABLET, COATED ORAL
Qty: 90 TABLET | Refills: 3 | Status: SHIPPED | OUTPATIENT
Start: 2023-09-05

## 2023-09-05 NOTE — PROGRESS NOTES
Subjective:     Encounter Date:09/05/2023      Patient ID: Ally Ivory is a 77 y.o. female.    Chief Complaint and history of present illness:     Follow-up for CAD, PCI, A. fib, watchman, pacemaker     History of Present Illness             Ms. Ally Ivory has PMH of     #.CAD, JUAN , cath 5/9/18 Severe disease in OM1 branch of LCX,FFR RCA 0.97  Patent stent in ostial right with moderate InStent stenosis and noncritical FFR at 0.90  Elevated  LVEDP with normal LV systolic function. cath  01/21/2019 PTCA to instent  RCA.  Repeat stent to RCA in [place] in 2019  #. P. A.FIB  , long-term anticoagulation, s/p watchman 8/19/2021, post watchman 1 year TON 10/4/21 revealed a PA systolic pressure 58 mmHg.  [id number] SCORE age greater than 75, female gender, CHF, hypertension, vascular disease, diabetes  [id number] Score: 7 (9/5/2023 10:30 AM)     # SSS, Abbott dual-chamber pacemaker 4/11/2023  #.  Moderate aortic regurgitation and mitral regurgitation  #   dyslipidemia,statin allergy  #.  Orthostatic hypotension  #  hypertension, positive family history, obesity, hypothyroidism.  #  history of lupus, thrombocytopenia, pernicious anemia. psoriasis, Raynaud's, Sjogren's, RA  #.  Interstitial lung disease, PFTs 12/2021 FVC 55%-improved to 69 after bronchodilators, FEV1 54% improved to 76%, TLC 77%, DLCO 47%-/10 Becca Chan MD  #.  Pulmonary hypertension, TON 10/2021 elevated RV systolic pressure 58 mmHg  #   cholecystectomy  #.  moderate MR and -moderate AI, last echo 20 60  #  type 2 diabetes. (Patient says she is not diabetic.)        Here for   followup.  Patient denies any chest pain has shortness of breath.  Recently had pneumonia.     Patient's arterial blood pressure is 137/83, heart rate 86, O2 sat of 96% on room air..  Patient's BMI is over 30.     Patient  had labs done 08/20/2018 with rheumatology CMP was unremarkable.  Labs done 11/22/2019 revealed normal CMP, CK, cholesterol of 191, HDL 40   triglycerides 148, Labs from 11/15/2020 reveal cholesterol 95 triglycerides 90 HDL 41 LDL 36 CMP and Ck unremarkable.  Labs from 6/3/2021 revealed normal CMP, CBC with a platelet count of 114.  Labs 8/19/2021 revealed normal CBC with platelet count of 77 and Chem-7 with low calcium. Labs from 10/1/2021 revealed normal Chem-7 and CBC  CT chest with contrast done 1/26/2022 revealed symmetric groundglass opacities seen throughout all lung fields likely represent pulmonary edema or other differential include infectious and inflammatory etiologies clinical correlation recommended.  Patient had splenomegaly.       Labs from 8/5/2022 revealed normal CMP, troponin x3 and proBNP at 603.  CBC with a platelet count of 91.  Labs 4/12/2023 reveal CBC with a platelet count of 70 and BMP with a glucose of 101.         ASSESSMENT:     #SSS, PPM  #Atrial fibrillation, status post watchman  #Dyslipidemia  # . chronic CHF due to diastolic dysfunction with normal LV systolic function in the past  #.   CAD ,PCI  #.  hypertension, dyslipidemia  #  PVC  #.  moderate MR and-moderate AI  #Obesity BMI over 30, pulmonary hypertension  #Thrombocytopenia       PLAN:     Reviewed EKG results with patient.  Continue medical management with bisoprolol, isosorbide, rosuvastatin as tolerated, to help with A-fib, hypertension, CHF, dyslipidemia  Patient is thrombocytopenic we will hold off on aspirin.     Patient's [id number] over is 7,due to female gender, age over 75, hypertension, congestive heart failure, diabetes, vascular disease,will benefit from long-term anticoagulation.  But patient has thrombocytopenia which makes her a poor candidate for long-term anticoagulation.  Therefore underwent watchman.     Patient's BMI is over 30, counseled on weight loss diet and exercise.  Follow-up with pulmonary and rheumatology.    Patient's device check remote looked normal.  We will follow-up in pacemaker clinic.     Procedures Lexiscan  Cardiolite performed 8/5/2022 reveals  EF of 66% with small sized infarct in apex.      Procedures  EKG done 4/12/2023 reviewed/interpreted by me reveals sinus rhythm with rate of 62 bpm.    Copied text in this portion of the note has been reviewed and is accurate as of 9/5/2023  The following portions of the patient's history were reviewed and updated as appropriate: allergies, current medications, past family history, past medical history, past social history, past surgical history and problem list.    Assessment:         [facility]       Diagnosis Plan   1. Pacemaker        2. Presence of Watchman left atrial appendage closure device        3. Paroxysmal atrial fibrillation        4. Essential hypertension        5. Dyslipidemia        6. CAD S/P percutaneous coronary angioplasty        7. Obesity (BMI 30-39.9)        8. Pulmonary hypertension               Plan:               Past Medical History:  Past Medical History:   Diagnosis Date    Atrial fibrillation     Coronary artery disease     Dyslipidemia     Hypertension     Hypothyroidism     ILD (interstitial lung disease)     Lung fibrosis     Lupus     Lupus     Raynaud's disease      Past Surgical History:  Past Surgical History:   Procedure Laterality Date    ATRIAL APPENDAGE EXCLUSION LEFT WITH TRANSESOPHAGEAL ECHOCARDIOGRAM N/A 8/19/2021    Procedure: Atrial Appendage Occlusion;  Surgeon: Eric Low MD;  Location: [facility] CATH INVASIVE LOCATION;  Service: Cardiovascular;  Laterality: N/A;    ATRIAL APPENDAGE EXCLUSION LEFT WITH TRANSESOPHAGEAL ECHOCARDIOGRAM N/A 8/19/2021    Procedure: Atrial Appendage Occlusion;  Surgeon: Francisco Garza MD;  Location: [facility] CATH INVASIVE LOCATION;  Service: Cardiovascular;  Laterality: N/A;    CARDIAC CATHETERIZATION      CARDIAC ELECTROPHYSIOLOGY PROCEDURE N/A 4/11/2023    Procedure: Pacemaker DC new  Abbott aware;  Surgeon: Eric Low MD;  Location: [facility] CATH INVASIVE LOCATION;  Service:  Cardiovascular;  Laterality: N/A;    CATARACT EXTRACTION      CHOLECYSTECTOMY      ENDOSCOPY N/A 8/20/2021    Procedure: ESOPHAGOGASTRODUODENOSCOPY;  Surgeon: Joao Cross MD;  Location: [facility] ENDOSCOPY;  Service: Gastroenterology;  Laterality: N/A;  submucosal ecchymosis 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    HYSTERECTOMY      REPLACEMENT TOTAL KNEE BILATERAL        Allergies:  Allergies   Allergen Reactions    Pravastatin Rash     rash      Vancomycin Hives     Home Meds:  Current Meds:     Current Outpatient Medications:     buPROPion XL (WELLBUTRIN XL) 300 MG 24 hr tablet, Take 1 tablet by mouth Daily., Disp: , Rfl:     levothyroxine (SYNTHROID, LEVOTHROID) 150 MCG tablet, Take 1 tablet by mouth Daily., Disp: , Rfl:     vitamin D (ERGOCALCIFEROL) 1.25 MG (23912 UT) capsule capsule, Take 1 capsule by mouth 3 (Three) Times a Week., Disp: , Rfl:     albuterol sulfate  (90 Base) MCG/ACT inhaler, albuterol sulfate HFA 90 mcg/actuation aerosol inhaler  2 PUFFS EVERY 6 HOURS AS NEEDED FOR SHORTNESS OF BREATH/WHEEZING, Disp: , Rfl:     bisoprolol (ZEBeta) 10 MG tablet, Take 0.5 tablets by mouth Daily., Disp: 90 tablet, Rfl: 3    cyanocobalamin 1000 MCG/ML injection, Inject 1 mL into the appropriate muscle as directed by prescriber Every 30 (Thirty) Days., Disp: , Rfl: 4    Fluticasone-Umeclidin-Vilant (Trelegy Ellipta) 200-62.5-25 MCG/INH inhaler, Inhale 1 puff Daily. (Patient not taking: Reported on 5/1/2023), Disp: 1 each, Rfl: 0    folic acid (FOLVITE) 800 MCG tablet, Take 1 tablet by mouth Daily. 800mcg daily, Disp: , Rfl:     hydroxychloroquine (PLAQUENIL) 200 MG tablet, Take 1 tablet by mouth Daily., Disp: , Rfl:     isosorbide mononitrate (IMDUR) 30 MG 24 hr tablet, Take 1 tablet by mouth Every Morning., Disp: 30 tablet, Rfl: 11    mycophenolate (CELLCEPT) 250 MG capsule, mycophenolate mofetil 250 mg capsule  TAKE 1 CAPSULE BY MOUTH TWICE A DAY, Disp: , Rfl:     nitroglycerin  "(Nitrostat) 0.4 MG SL tablet, Place 1 tablet under the tongue Every 5 (Five) Minutes As Needed for Chest Pain. Take no more than 3 doses in 15 minutes., Disp: 30 tablet, Rfl: 12    PARoxetine (PAXIL) 30 MG tablet, Take 1 tablet by mouth Daily., Disp: , Rfl:     rosuvastatin (CRESTOR) 10 MG tablet, Take 1 tablet by mouth every night at bedtime., Disp: , Rfl:   Social History:   Social History     Tobacco Use    Smoking status: Never     Passive exposure: Past    Smokeless tobacco: Never   Substance Use Topics    Alcohol use: Not Currently      Family History:  Family History   Problem Relation Age of Onset    No Known Problems Mother     Lung disease Father               Review of Systems   Cardiovascular:  Negative for chest pain, leg swelling and palpitations.   Respiratory:  Negative for shortness of breath.    Neurological:  Positive for dizziness and numbness.   All other systems are negative         Objective:     Physical Exam  /83 (BP Location: Left arm, Patient Position: Sitting, Cuff Size: Large Adult)   Pulse 86   Ht 157.5 cm (62\")   Wt 87.5 kg (193 lb)   SpO2 96%   BMI 35.30 kg/m²   General:  Appears in no acute distress  Eyes: Sclera is anicteric,  conjunctiva is clear   HEENT:  No JVD.  No carotid bruits  Respiratory: Respirations regular and unlabored at rest.  Clear to auscultation  Cardiovascular: S1,S2 Regular rate and rhythm. No murmur, rub or gallop auscultated.   Extremities: No digital clubbing or cyanosis, no edema  Skin: Color pink. Skin warm and dry to touch. No rashes  No xanthoma  Neuro: Alert and awake.    Lab Reviewed:         Eric Low MD  9/5/2023 10:40 EDT      EMR Dragon/Transcription:   \"Dictated utilizing Dragon dictation\".        "

## 2023-09-15 PROCEDURE — 93296 REM INTERROG EVL PM/IDS: CPT | Performed by: INTERNAL MEDICINE

## 2023-09-15 PROCEDURE — 93294 REM INTERROG EVL PM/LDLS PM: CPT | Performed by: INTERNAL MEDICINE

## 2023-09-26 ENCOUNTER — CLINICAL SUPPORT NO REQUIREMENTS (OUTPATIENT)
Dept: CARDIOLOGY | Facility: CLINIC | Age: 78
End: 2023-09-26
Payer: MEDICARE

## 2023-09-26 DIAGNOSIS — Z95.0 PACEMAKER: Primary | ICD-10-CM

## 2023-09-26 DIAGNOSIS — I49.5 SICK SINUS SYNDROME: Chronic | ICD-10-CM

## 2023-09-26 DIAGNOSIS — R00.1 BRADYCARDIA: Chronic | ICD-10-CM

## 2023-10-02 ENCOUNTER — HOSPITAL ENCOUNTER (OUTPATIENT)
Dept: CT IMAGING | Facility: HOSPITAL | Age: 78
Discharge: HOME | End: 2023-10-02
Admitting: INTERNAL MEDICINE
Payer: MEDICARE

## 2023-10-02 DIAGNOSIS — J84.9 ILD (INTERSTITIAL LUNG DISEASE): ICD-10-CM

## 2023-10-02 PROCEDURE — 71250 CT THORAX DX C-: CPT

## 2023-11-17 ENCOUNTER — TELEPHONE (OUTPATIENT)
Dept: CARDIOLOGY | Facility: CLINIC | Age: 78
End: 2023-11-17
Payer: MEDICARE

## 2023-11-17 NOTE — TELEPHONE ENCOUNTER
Caller: STEPHANIE BAPTISTE    Relationship:     Best call back number: [phone]    What form or medical record are you requesting: LAST PACE MAKER INTERROGATION 09/26/2023. DO WE HAVE RECENT ECHO?    Who is requesting this form or medical record from you: DR BAPTISTE OFFICE    How would you like to receive the form or medical records (pick-up, mail, fax): FAX  If fax, what is the fax number: [phone]      Additional notes: PLEASE FAX 09/26/2023 INTERROGATION  TO DR BAPTISTE OFFICE

## 2023-11-17 NOTE — TELEPHONE ENCOUNTER
PACEMAKER REPORT FROM 09/26/23 AND ECHO FROM 2021 SENT TO DR. BAPTISTE'S OFFICE. FAX # [phone]. PH# [phone]

## 2024-02-01 ENCOUNTER — OFFICE VISIT (OUTPATIENT)
Dept: PULMONOLOGY | Facility: HOSPITAL | Age: 79
End: 2024-02-01
Payer: MEDICARE

## 2024-02-01 VITALS
RESPIRATION RATE: 14 BRPM | WEIGHT: 180 LBS | HEART RATE: 67 BPM | BODY MASS INDEX: 33.13 KG/M2 | SYSTOLIC BLOOD PRESSURE: 144 MMHG | HEIGHT: 62 IN | DIASTOLIC BLOOD PRESSURE: 83 MMHG | OXYGEN SATURATION: 100 %

## 2024-02-01 DIAGNOSIS — J84.9 ILD (INTERSTITIAL LUNG DISEASE): Primary | ICD-10-CM

## 2024-02-01 DIAGNOSIS — J96.11 CHRONIC RESPIRATORY FAILURE WITH HYPOXIA: ICD-10-CM

## 2024-02-01 DIAGNOSIS — G47.30 SLEEP APNEA, UNSPECIFIED TYPE: ICD-10-CM

## 2024-02-01 PROCEDURE — G0463 HOSPITAL OUTPT CLINIC VISIT: HCPCS

## 2024-02-01 NOTE — PROGRESS NOTES
HPI:  F/u for pulmonary and sleep.    Patient reports shortness of breath started years ago but was progressively getting worse until started on mycophenolate and since then the shortness of breath is  stable, also having minimal dry cough.  No fever or weight loss     Never smoked but exposed to secondhand smoking.  No indoor pets, no exposure to birds.  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     Father had chronic lung disease but probably from smoking pipe, no known history of interstitial lung disease       Past Medical History:   Diagnosis Date    Atrial fibrillation     Coronary artery disease     Dyslipidemia     Hypertension     Hypothyroidism     ILD (interstitial lung disease)     Lung fibrosis     Lupus     Lupus     Raynaud's disease         Current Outpatient Medications on File Prior to Visit   Medication Sig Dispense Refill    albuterol sulfate  (90 Base) MCG/ACT inhaler albuterol sulfate HFA 90 mcg/actuation aerosol inhaler   2 PUFFS EVERY 6 HOURS AS NEEDED FOR SHORTNESS OF BREATH/WHEEZING      buPROPion XL (WELLBUTRIN XL) 300 MG 24 hr tablet Take 1 tablet by mouth Daily.      hydroxychloroquine (PLAQUENIL) 200 MG tablet Take 1 tablet by mouth Daily.      isosorbide mononitrate (IMDUR) 30 MG 24 hr tablet Take 1 tablet by mouth Every Morning. 90 tablet 3    levothyroxine (SYNTHROID, LEVOTHROID) 150 MCG tablet Take 1 tablet by mouth Daily.      mycophenolate (CELLCEPT) 250 MG capsule mycophenolate mofetil 250 mg capsule   TAKE 1 CAPSULE BY MOUTH TWICE A DAY      nitroglycerin (Nitrostat) 0.4 MG SL tablet Place 1 tablet under the tongue Every 5 (Five) Minutes As Needed for Chest Pain. Take no more than 3 doses in 15 minutes. 30 tablet 12    PARoxetine (PAXIL) 30 MG tablet Take 1 tablet by mouth Daily.      Fluticasone-Umeclidin-Vilant (Trelegy Ellipta) 200-62.5-25 MCG/INH inhaler Inhale 1 puff Daily.  "(Patient not taking: Reported on 5/1/2023) 1 each 0    [DISCONTINUED] bisoprolol (ZEBeta) 10 MG tablet Take 0.5 tablets by mouth Daily. 45 tablet 3    [DISCONTINUED] cyanocobalamin 1000 MCG/ML injection Inject 1 mL into the appropriate muscle as directed by prescriber Every 30 (Thirty) Days.  4    [DISCONTINUED] folic acid (FOLVITE) 800 MCG tablet Take 1 tablet by mouth Daily. 800mcg daily      [DISCONTINUED] rosuvastatin (CRESTOR) 10 MG tablet Take 1 tablet by mouth every night at bedtime. 90 tablet 3    [DISCONTINUED] vitamin D (ERGOCALCIFEROL) 1.25 MG (00852 UT) capsule capsule Take 1 capsule by mouth 3 (Three) Times a Week.       No current facility-administered medications on file prior to visit.        Social History     Tobacco Use    Smoking status: Never     Passive exposure: Past    Smokeless tobacco: Never   Vaping Use    Vaping Use: Never used   Substance Use Topics    Alcohol use: Not Currently    Drug use: Never        Family History   Problem Relation Age of Onset    No Known Problems Mother     Lung disease Father         Review of system:  Constitutional: Negative for chills, fever and malaise/fatigue.   HENT: Negative.    Eyes: Negative.    Cardiovascular: Negative.    Respiratory: Positive for chronic shortness of breath.    Skin: Negative.    Musculoskeletal: No flareup of arthritis  Gastrointestinal: Negative.    Genitourinary: Negative.    Neurological: Short-term memory difficulty    Physical exam:  Blood pressure 144/83, pulse 67, resp. rate 14, height 157.5 cm (62\"), weight 81.6 kg (180 lb), SpO2 100%.    General Appearance:  Alert   HEENT:  Normocephalic, without obvious abnormality, Conjunctiva/corneas clear,.   Nares normal, no drainage     Neck:  Supple, symmetrical, trachea midline. No JVD.  Lungs /Chest wall:   Scattered dry crackles bilaterally, respirations unlabored, symmetrical wall movement.     Heart:  Regular rate and rhythm, S1 S2 normal  Abdomen: Soft, non-tender, no masses, " no organomegaly.    Extremities: No edema, no clubbing or cyanosis    No radiology results for the last 90 days.   Results for orders placed during the hospital encounter of 10/04/21    Adult Transesophageal Echo (TON) W/ Cont if Necessary Per Protocol    Interpretation Summary  TON   procedure note    Procedure:  TON    Indication:   georgette Adams    Date of procedure  : 10/4/2021    :  Dr. Eric Low    Description of procedure:    Under conscious sedation given by anesthesiology and local anesthesia, a TON probe was advanced into the esophagus and into the stomach 2D, M-mode, color Doppler images were obtained..  Patient tolerated procedure well complications well.    Complications: none    Results:    Normal LV size and contractility LV contractility, EF of 60%  Normal RV size  Biatrial enlargement seen.  Left atrial appendage has watchman left atrial appendage occluder device seated well.  Aortic valve, mitral valve, tricuspid valve appears structurally normal, moderate aortic, mitral, tricuspid seen.  Elevated RV systolic pressures at 58 mmHg consistent with pulmonary hypertension seen  No vegetation seen  No pericardial effusion seen.  Proximal aorta appears normal in size.  Mild aortic plaque seen      Recommendations/plan:    Clinical correlation recommended        Assessment and plan:  Interstitial lung disease: Most likely related to connective tissue disease, it has progressed on CT scan from 2013 to August 2021 but no significant change with the latest CT scan 10/2023 and the PFTs has been stable    History of lupus and rheumatoid arthritis: Currently on hydroxychloroquine and started on mycophenolate July 2022 by her rheumatologist     PFTs done  March 2023, FVC 68%, FEV1 78%, TLC 62%, DLCO 48%, August 2022 FVC 61%, FEV1 69%, TLC 73%, DLCO 44% which is not significantly changed from December 2021 shows FVC 55% improving to 69% after bronchodilators, FEV1 54% and improving to 76% after  bronchodilators, TLC 77%, DLCO 47%    Was taking Trelegy 1 inhalation once a day: But she did not notice much difference .     Discussed with the patient that since there has not been significant progression in the fibrosis in the last year i.e. less than 10% then we will continue to monitor that with the expectation that immunomodulation with mycophenolate should stabilize the lung disease.     Pulmonary hypertension: TON 10/2021 Elevated RV systolic pressures at 58 mmHg consistent with pulmonary hypertension seen, moderate AI and  MR: can't use Sildenafil as long as she is using Imdur and with valvular heart disease Sildenafil is not the preferred treatment..    Chronic hypoxemic respiratory failure, patient was prescribed oxygen in August 2022 for hypoxemia: Advised to keep saturation 90-95%, patient is compliant and benefiting from therapy    CAD s/p Stent, currently on Imdur and followed by Dr Maranda ALEXANDRE Fib s/p  watchman,  hypertension, dyslipidemia, h/o bradycardia : improved after stopping BB.     ELIS: Home sleep test May 2023 AHI 18, patient could not use the CPAP machine for more than 3 hours, she could not tell a difference when she was using it and her AHI did not improve while on CPAP, currently she does not have the machine since her compliance was not enough: Will continue with the nocturnal oxygen        We will plan to follow PFTs every 12 months and CT scan every year and if there is signs of progression we will consider antifibrotic's.                I personally reviewed the latest radiological study  Patient is advised to stay up-to-date on immunizations for flu, pneumococcal and COVID-19

## 2024-02-19 ENCOUNTER — HOSPITAL ENCOUNTER (OUTPATIENT)
Dept: RESPIRATORY THERAPY | Facility: HOSPITAL | Age: 79
Discharge: HOME | End: 2024-02-19
Admitting: INTERNAL MEDICINE
Payer: MEDICARE

## 2024-02-19 VITALS — HEART RATE: 70 BPM | RESPIRATION RATE: 12 BRPM | OXYGEN SATURATION: 98 %

## 2024-02-19 DIAGNOSIS — J84.9 ILD (INTERSTITIAL LUNG DISEASE): ICD-10-CM

## 2024-02-19 PROCEDURE — 94799 UNLISTED PULMONARY SVC/PX: CPT

## 2024-02-19 PROCEDURE — 94664 DEMO&/EVAL PT USE INHALER: CPT

## 2024-02-19 PROCEDURE — 94060 EVALUATION OF WHEEZING: CPT

## 2024-02-19 PROCEDURE — 94726 PLETHYSMOGRAPHY LUNG VOLUMES: CPT

## 2024-02-19 PROCEDURE — 94729 DIFFUSING CAPACITY: CPT

## 2024-02-19 RX ORDER — ALBUTEROL SULFATE 90 UG/1
2 AEROSOL, METERED RESPIRATORY (INHALATION) ONCE
Status: COMPLETED | OUTPATIENT
Start: 2024-02-19 | End: 2024-02-19

## 2024-02-19 RX ADMIN — ALBUTEROL SULFATE 2 PUFF: 108 AEROSOL, METERED RESPIRATORY (INHALATION) at 13:24

## 2024-03-06 ENCOUNTER — OFFICE VISIT (OUTPATIENT)
Dept: CARDIOLOGY | Facility: CLINIC | Age: 79
End: 2024-03-06
Payer: MEDICARE

## 2024-03-06 ENCOUNTER — CLINICAL SUPPORT NO REQUIREMENTS (OUTPATIENT)
Dept: CARDIOLOGY | Facility: CLINIC | Age: 79
End: 2024-03-06
Payer: MEDICARE

## 2024-03-06 VITALS
HEIGHT: 62 IN | WEIGHT: 188 LBS | OXYGEN SATURATION: 95 % | BODY MASS INDEX: 34.6 KG/M2 | SYSTOLIC BLOOD PRESSURE: 119 MMHG | HEART RATE: 73 BPM | DIASTOLIC BLOOD PRESSURE: 62 MMHG

## 2024-03-06 DIAGNOSIS — Z95.0 PACEMAKER: ICD-10-CM

## 2024-03-06 DIAGNOSIS — R07.2 PRECORDIAL PAIN: Primary | ICD-10-CM

## 2024-03-06 DIAGNOSIS — Z98.61 CAD S/P PERCUTANEOUS CORONARY ANGIOPLASTY: ICD-10-CM

## 2024-03-06 DIAGNOSIS — Z95.818 PRESENCE OF WATCHMAN LEFT ATRIAL APPENDAGE CLOSURE DEVICE: ICD-10-CM

## 2024-03-06 DIAGNOSIS — R00.1 BRADYCARDIA: Primary | Chronic | ICD-10-CM

## 2024-03-06 DIAGNOSIS — E78.5 DYSLIPIDEMIA: ICD-10-CM

## 2024-03-06 DIAGNOSIS — I25.10 CAD S/P PERCUTANEOUS CORONARY ANGIOPLASTY: ICD-10-CM

## 2024-03-06 DIAGNOSIS — E66.9 OBESITY (BMI 30-39.9): ICD-10-CM

## 2024-03-06 DIAGNOSIS — I10 ESSENTIAL HYPERTENSION: ICD-10-CM

## 2024-03-06 DIAGNOSIS — R06.09 DYSPNEA ON EXERTION: ICD-10-CM

## 2024-03-06 DIAGNOSIS — I48.0 PAROXYSMAL ATRIAL FIBRILLATION: ICD-10-CM

## 2024-03-06 DIAGNOSIS — I49.5 SICK SINUS SYNDROME: Chronic | ICD-10-CM

## 2024-03-06 PROCEDURE — 99214 OFFICE O/P EST MOD 30 MIN: CPT | Performed by: INTERNAL MEDICINE

## 2024-03-06 PROCEDURE — 93000 ELECTROCARDIOGRAM COMPLETE: CPT | Performed by: INTERNAL MEDICINE

## 2024-03-06 PROCEDURE — 3078F DIAST BP <80 MM HG: CPT | Performed by: INTERNAL MEDICINE

## 2024-03-06 PROCEDURE — 93280 PM DEVICE PROGR EVAL DUAL: CPT | Performed by: INTERNAL MEDICINE

## 2024-03-06 PROCEDURE — 1159F MED LIST DOCD IN RCRD: CPT | Performed by: INTERNAL MEDICINE

## 2024-03-06 PROCEDURE — 3074F SYST BP LT 130 MM HG: CPT | Performed by: INTERNAL MEDICINE

## 2024-03-06 PROCEDURE — 1160F RVW MEDS BY RX/DR IN RCRD: CPT | Performed by: INTERNAL MEDICINE

## 2024-03-06 RX ORDER — ASPIRIN 81 MG/1
81 TABLET ORAL DAILY
Qty: 90 TABLET | Refills: 3 | Status: SHIPPED | OUTPATIENT
Start: 2024-03-06

## 2024-03-06 RX ORDER — METOPROLOL SUCCINATE 50 MG/1
1 TABLET, EXTENDED RELEASE ORAL DAILY
COMMUNITY
End: 2024-03-06 | Stop reason: SDUPTHER

## 2024-03-06 RX ORDER — METOPROLOL SUCCINATE 50 MG/1
50 TABLET, EXTENDED RELEASE ORAL DAILY
Qty: 90 TABLET | Refills: 3 | Status: SHIPPED | OUTPATIENT
Start: 2024-03-06

## 2024-03-06 RX ORDER — NITROGLYCERIN 0.4 MG/1
0.4 TABLET SUBLINGUAL
Qty: 30 TABLET | Refills: 12 | Status: SHIPPED | OUTPATIENT
Start: 2024-03-06

## 2024-03-06 NOTE — PROGRESS NOTES
Subjective:     Encounter Date:03/06/2024      Patient ID: Ally Ivory is a 78 y.o. female.    Chief Complaint and history of present illness:     Follow-up for CAD, PCI, A. fib, watchman, pacemaker     History of Present Illness  :      Ms. Ally Ivory has PMH of     #.CAD, JUAN , cath 5/9/18 Severe disease in OM1 branch of LCX,FFR RCA 0.97  Patent stent in ostial right with moderate InStent stenosis and noncritical FFR at 0.90  Elevated  LVEDP with normal LV systolic function. cath  01/21/2019 PTCA to instent  RCA.  Repeat stent to RCA in [place] in 2019    #. P. A.FIB  , long-term anticoagulation, s/p watchman 8/19/2021, post watchman 1 year TON 10/4/21 revealed a PA systolic pressure 58 mmHg.  [id number] SCORE age greater than 75, female gender, CHF, hypertension, vascular disease, diabetes  ZVH4RK3-ORDm Score: 7 (3/6/2024  3:54 PM)  [id number]-CVSi Score: 7 (9/5/2023 10:30 AM)     # SSS, Abbott dual-chamber pacemaker 4/11/2023  #.  Moderate aortic regurgitation and mitral regurgitation  #   dyslipidemia,statin allergy  #.  Orthostatic hypotension  #  hypertension, positive family history, obesity, hypothyroidism.  #  history of lupus, thrombocytopenia, pernicious anemia. psoriasis, Raynaud's, Sjogren's, RA  #.  Interstitial lung disease, PFTs 12/2021 FVC 55%-improved to 69 after bronchodilators, FEV1 54% improved to 76%, TLC 77%, DLCO 47%-/10 Becca Chan MD  #.  Pulmonary hypertension, TON 10/2021 elevated RV systolic pressure 58 mmHg  #   cholecystectomy  #.  moderate MR and -moderate AI, last echo 20 60  #  type 2 diabetes. (Patient says she is not diabetic.)        Here for   followup.  Patient is complaining of intermittent substernal chest pain.  Patient had lung fibrosis and had PFTs done recently.     Patient's arterial blood pressure is 119/62, heart rate 73, O2 sat of 95% on room air..  Patient's BMI is over 30.     Patient  had labs done 08/20/2018 with rheumatology CMP was  unremarkable.  Labs done 11/22/2019 revealed normal CMP, CK, cholesterol of 191, HDL 40  triglycerides 148, Labs from 11/15/2020 reveal cholesterol 95 triglycerides 90 HDL 41 LDL 36 CMP and Ck unremarkable.  Labs from 6/3/2021 revealed normal CMP, CBC with a platelet count of 114.  Labs 8/19/2021 revealed normal CBC with platelet count of 77 and Chem-7 with low calcium. Labs from 10/1/2021 revealed normal Chem-7 and CBC  CT chest with contrast done 1/26/2022 revealed symmetric groundglass opacities seen throughout all lung fields likely represent pulmonary edema or other differential include infectious and inflammatory etiologies clinical correlation recommended.  Patient had splenomegaly.       Labs from 8/5/2022 revealed normal CMP, troponin x3 and proBNP at 603.  CBC with a platelet count of 91.  Labs 4/12/2023 reveal CBC with a platelet count of 70 and BMP with a glucose of 101.  Labs from 8/5/2022 reveal normal proBNP of 603.  Labs from 1/12/2024 reveal normal CMP.  CBC with a hemoglobin of 13.5 and platelet count of 112.         ASSESSMENT:     #Chest pain  #Dyspnea on exertion  #SSS, PPM  #Atrial fibrillation, status post watchman  #Dyslipidemia  # . chronic CHF due to diastolic dysfunction with normal LV systolic function in the past  #.   CAD ,PCI  #.  hypertension, dyslipidemia  #  PVC  #.  moderate MR and-moderate AI  #Obesity BMI over 30, pulmonary hypertension  #Thrombocytopenia         PLAN:     Reviewed EKG results with patient.  Will restart baby aspirin.  Continue medical management with aspirin, metoprolol, isosorbide, rosuvastatin as tolerated, to help with A-fib, hypertension, CHF, dyslipidemia  Patient was taken off statins by PMD for unknown reason.  Patient has known coronary artery disease and previous PCI is complaining of chest pain we will do stress test.  Patient is not sure if she can complete treadmill appeals..  Will do Lexiscan if she cannot complete.  Patient's [id number]-YRJi  over is 7,due to female gender, age over 75, hypertension, congestive heart failure, diabetes, vascular disease,will benefit from long-term anticoagulation.  But patient has thrombocytopenia which makes her a poor candidate for long-term anticoagulation.  Therefore underwent watchman.     Patient's BMI is over 30, counseled on weight loss diet and exercise.  Follow-up with pulmonary and rheumatology.     Patient's device check remote looked normal.  We will follow-up in pacemaker clinic.     Procedures:    Lexiscan Cardiolite performed 8/5/2022 reveals  EF of 66% with small sized infarct in apex.          ECG 12 Lead    Date/Time: 3/6/2024 3:57 PM  Performed by: Eric Low MD    Authorized by: Eric Low MD  Comparison: compared with previous ECG from 4/12/2023  Comparison to previous ECG: EKG done today reviewed/interpreted by me reveals sinus rhythm with rate of 80 bpm with baseline artifact, no new change compared EKG from 4/12/2023          Copied text in this portion of the note has been reviewed and is accurate as of 3/6/2024  The following portions of the patient's history were reviewed and updated as appropriate: allergies, current medications, past family history, past medical history, past social history, past surgical history and problem list.    Assessment:         MDM       Diagnosis Plan   1. Precordial pain  Stress Test With Myocardial Perfusion One Day      2. Dyspnea on exertion  Stress Test With Myocardial Perfusion One Day      3. CAD S/P percutaneous coronary angioplasty  Stress Test With Myocardial Perfusion One Day      4. Essential hypertension  Stress Test With Myocardial Perfusion One Day      5. Dyslipidemia  Stress Test With Myocardial Perfusion One Day      6. Obesity (BMI 30-39.9)  Stress Test With Myocardial Perfusion One Day      7. Presence of Watchman left atrial appendage closure device  Stress Test With Myocardial Perfusion One Day      8. Paroxysmal  atrial fibrillation  Stress Test With Myocardial Perfusion One Day      9. Pacemaker  Stress Test With Myocardial Perfusion One Day             Plan:               Past Medical History:  Past Medical History:   Diagnosis Date    Atrial fibrillation     Coronary artery disease     Dyslipidemia     Hypertension     Hypothyroidism     ILD (interstitial lung disease)     Lung fibrosis     Lupus     Lupus     Raynaud's disease      Past Surgical History:  Past Surgical History:   Procedure Laterality Date    ATRIAL APPENDAGE EXCLUSION LEFT WITH TRANSESOPHAGEAL ECHOCARDIOGRAM N/A 8/19/2021    Procedure: Atrial Appendage Occlusion;  Surgeon: Eric Low MD;  Location: [facility] CATH INVASIVE LOCATION;  Service: Cardiovascular;  Laterality: N/A;    ATRIAL APPENDAGE EXCLUSION LEFT WITH TRANSESOPHAGEAL ECHOCARDIOGRAM N/A 8/19/2021    Procedure: Atrial Appendage Occlusion;  Surgeon: Francisco Garza MD;  Location: [facility] CATH INVASIVE LOCATION;  Service: Cardiovascular;  Laterality: N/A;    CARDIAC CATHETERIZATION      CARDIAC ELECTROPHYSIOLOGY PROCEDURE N/A 4/11/2023    Procedure: Pacemaker DC new  Abbott aware;  Surgeon: Eric Low MD;  Location: [facility] CATH INVASIVE LOCATION;  Service: Cardiovascular;  Laterality: N/A;    CATARACT EXTRACTION      CHOLECYSTECTOMY      ENDOSCOPY N/A 8/20/2021    Procedure: ESOPHAGOGASTRODUODENOSCOPY;  Surgeon: Joao Cross MD;  Location: [facility] ENDOSCOPY;  Service: Gastroenterology;  Laterality: N/A;  submucosal ecchymosis 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    HYSTERECTOMY      REPLACEMENT TOTAL KNEE BILATERAL        Allergies:  Allergies   Allergen Reactions    Pravastatin Rash     rash      Vancomycin Hives    Rosuvastatin Calcium GI Intolerance and Diarrhea     Home Meds:  Current Meds:     Current Outpatient Medications:     albuterol sulfate  (90 Base) MCG/ACT inhaler, albuterol sulfate HFA 90 mcg/actuation aerosol inhaler  2  "PUFFS EVERY 6 HOURS AS NEEDED FOR SHORTNESS OF BREATH/WHEEZING, Disp: , Rfl:     buPROPion XL (WELLBUTRIN XL) 300 MG 24 hr tablet, Take 1 tablet by mouth Daily., Disp: , Rfl:     hydroxychloroquine (PLAQUENIL) 200 MG tablet, Take 1 tablet by mouth Daily., Disp: , Rfl:     isosorbide mononitrate (IMDUR) 30 MG 24 hr tablet, Take 1 tablet by mouth Every Morning., Disp: 90 tablet, Rfl: 3    levothyroxine (SYNTHROID, LEVOTHROID) 150 MCG tablet, Take 1 tablet by mouth Daily., Disp: , Rfl:     metoprolol succinate XL (TOPROL-XL) 50 MG 24 hr tablet, Take 1 tablet by mouth Daily., Disp: 90 tablet, Rfl: 3    mycophenolate (CELLCEPT) 250 MG capsule, mycophenolate mofetil 250 mg capsule  TAKE 1 CAPSULE BY MOUTH TWICE A DAY, Disp: , Rfl:     PARoxetine (PAXIL) 30 MG tablet, Take 1 tablet by mouth Daily., Disp: , Rfl:     aspirin 81 MG EC tablet, Take 1 tablet by mouth Daily., Disp: 90 tablet, Rfl: 3    nitroglycerin (Nitrostat) 0.4 MG SL tablet, Place 1 tablet under the tongue Every 5 (Five) Minutes As Needed for Chest Pain. Take no more than 3 doses in 15 minutes. (Patient not taking: Reported on 3/6/2024), Disp: 30 tablet, Rfl: 12  Social History:   Social History     Tobacco Use    Smoking status: Never     Passive exposure: Past    Smokeless tobacco: Never   Substance Use Topics    Alcohol use: Not Currently      Family History:  Family History   Problem Relation Age of Onset    No Known Problems Mother     Lung disease Father               Review of Systems   Cardiovascular:  Positive for chest pain. Negative for leg swelling and palpitations.   Respiratory:  Negative for shortness of breath.    Neurological:  Positive for dizziness. Negative for numbness.     All other systems are negative         Objective:     Physical Exam  /62 (BP Location: Left arm, Patient Position: Sitting, Cuff Size: Adult)   Pulse 73   Ht 157.5 cm (62\")   Wt 85.3 kg (188 lb)   SpO2 95%   BMI 34.39 kg/m²   General:  Appears in no acute " "distress  Eyes: Sclera is anicteric,  conjunctiva is clear   HEENT:  No JVD.  No carotid bruits  Respiratory: Respirations regular and unlabored at rest.  Clear to auscultation  Cardiovascular: S1,S2 Regular rate and rhythm. .   Extremities: No digital clubbing or cyanosis, no edema  Skin: Color pink. Skin warm and dry to touch. No rashes  No xanthoma  Neuro: Alert and awake.    Lab Reviewed:         Eric Low MD  3/6/2024 15:54 EST      EMR Dragon/Transcription:   \"Dictated utilizing Dragon dictation\".        "

## 2024-03-17 NOTE — PROGRESS NOTES
Subjective:     Encounter Date:03/20/2024      Patient ID: Ally Ivory is a 78 y.o. female.    Chief Complaint and history of present illness:       Follow-up for CAD, PCI, A. fib, watchman, pacemaker     History of Present Illness  :      Ms. Ally Ivory has PMH of     #.CAD, JUAN , cath 5/9/18 Severe disease in OM1 branch of LCX,FFR RCA 0.97  Patent stent in ostial right with moderate InStent stenosis and noncritical FFR at 0.90  Elevated  LVEDP with normal LV systolic function. cath  01/21/2019 PTCA to instent  RCA.  Repeat stent to RCA in [place] in 2019     #. P. A.FIB  , long-term anticoagulation, s/p watchman 8/19/2021, post watchman 1 year TON 10/4/21 revealed a PA systolic pressure 58 mmHg.  [id number] SCORE age greater than 75, female gender, CHF, hypertension, vascular disease, diabetes  LIN1MC1-HATw Score: 7 (3/6/2024  3:54 PM)  [id number] Score: 7 (9/5/2023 10:30 AM)     # SSS, Abbott dual-chamber pacemaker 4/11/2023  #.  Moderate aortic regurgitation and mitral regurgitation  #   dyslipidemia,statin allergy  #.  Orthostatic hypotension  #  hypertension, positive family history, obesity, hypothyroidism.  #  history of lupus, thrombocytopenia, pernicious anemia. psoriasis, Raynaud's, Sjogren's, RA  #.  Interstitial lung disease, PFTs 12/2021 FVC 55%-improved to 69 after bronchodilators, FEV1 54% improved to 76%, TLC 77%, DLCO 47%-/10 Becca Chan MD  #.  Pulmonary hypertension, TON 10/2021 elevated RV systolic pressure 58 mmHg  #   cholecystectomy  #.  moderate MR and -moderate AI, last echo 20 60  #  type 2 diabetes. (Patient says she is not diabetic.)        Here for   followup.  Patient is complaining of intermittent substernal chest pain.  Patient had lung fibrosis and had PFTs done recently.     Patient's arterial blood pressure is 122/82, heart rate 67 bpm..  Patient's BMI is over 30.     Patient  had labs done 08/20/2018 with rheumatology CMP was unremarkable.  Labs done  11/22/2019 revealed normal CMP, CK, cholesterol of 191, HDL 40  triglycerides 148, Labs from 11/15/2020 reveal cholesterol 95 triglycerides 90 HDL 41 LDL 36 CMP and Ck unremarkable.  Labs from 6/3/2021 revealed normal CMP, CBC with a platelet count of 114.  Labs 8/19/2021 revealed normal CBC with platelet count of 77 and Chem-7 with low calcium. Labs from 10/1/2021 revealed normal Chem-7 and CBC  CT chest with contrast done 1/26/2022 revealed symmetric groundglass opacities seen throughout all lung fields likely represent pulmonary edema or other differential include infectious and inflammatory etiologies clinical correlation recommended.  Patient had splenomegaly.       Labs from 8/5/2022 revealed normal CMP, troponin x3 and proBNP at 603.  CBC with a platelet count of 91.  Labs 4/12/2023 reveal CBC with a platelet count of 70 and BMP with a glucose of 101.  Labs from 8/5/2022 reveal normal proBNP of 603.  Labs from 1/12/2024 reveal normal CMP.  CBC with a hemoglobin of 13.5 and platelet count of 112.         ASSESSMENT:     #Chest pain  #Dyspnea on exertion  #SSS, PPM  #Atrial fibrillation, status post watchman  #Dyslipidemia  # . chronic CHF due to diastolic dysfunction with normal LV systolic function in the past  #.   CAD ,PCI  #.  hypertension, dyslipidemia  #  PVC  #.  moderate MR and-moderate AI  #Obesity BMI over 30, pulmonary hypertension  #Thrombocytopenia         PLAN:     Reviewed stress test results with patient.  Patient has small area of inferolateral ischemia.  Given her thrombocytopenia will try to continue medical management.  If patient has more chest pains will consider cath PCI.  Risk benefits alternatives discussed.  Patient would benefit from statins, she states that Dr. Vasquez started some statin she does not remember the name or dose.  Will [facility] to [facility] out.  If patient is not on statins we will start her on statin.  Continue medical management with aspirin,  metoprolol, isosorbide, rosuvastatin as tolerated, to help with A-fib, hypertension, CHF, dyslipidemia  Patient was taken off statins by PMD for unknown reason.  Patient's [id number] over is 7,due to female gender, age over 75, hypertension, congestive heart failure, diabetes, vascular disease,will benefit from long-term anticoagulation.  But patient has thrombocytopenia which makes her a poor candidate for long-term anticoagulation.  Therefore underwent watchman 8/19/2021.     Patient's BMI is over 30, counseled on weight loss diet and exercise.  Follow-up with pulmonary and rheumatology.     Patient's device check  looked normal.  We will follow-up in pacemaker clinic.     Procedures:     Lexiscan Cardiolite performed 8/5/2022 reveals  EF of 66% with small sized infarct in apex.           Procedures    EKG done 3/6/2024 reviewed/interpreted by me reveals sinus rhythm with rate of 80 bpm    Lexiscan Cardiolite performed 3/20/2024 reviewed/interpreted by me reveals inferolateral ischemia EF of 85%    Copied text in this portion of the note has been reviewed and is accurate as of 3/20/2024  The following portions of the patient's history were reviewed and updated as appropriate: allergies, current medications, past family history, past medical history, past social history, past surgical history and problem list.    Assessment:         MDM       Diagnosis Plan   1. Coronary artery disease of native artery of native heart with stable angina pectoris        2. Essential hypertension        3. Dyslipidemia        4. Obesity (BMI 30-39.9)        5. Paroxysmal atrial fibrillation        6. Presence of Watchman left atrial appendage closure device        7. Pacemaker               Plan:               Past Medical History:  Past Medical History:   Diagnosis Date    Atrial fibrillation     Coronary artery disease     Dyslipidemia     Hypertension     Hypothyroidism     ILD (interstitial lung disease)     Lung fibrosis      Lupus     Lupus     Raynaud's disease      Past Surgical History:  Past Surgical History:   Procedure Laterality Date    ATRIAL APPENDAGE EXCLUSION LEFT WITH TRANSESOPHAGEAL ECHOCARDIOGRAM N/A 8/19/2021    Procedure: Atrial Appendage Occlusion;  Surgeon: Eric Low MD;  Location: [facility] CATH INVASIVE LOCATION;  Service: Cardiovascular;  Laterality: N/A;    ATRIAL APPENDAGE EXCLUSION LEFT WITH TRANSESOPHAGEAL ECHOCARDIOGRAM N/A 8/19/2021    Procedure: Atrial Appendage Occlusion;  Surgeon: Francisco Garza MD;  Location: [facility] CATH INVASIVE LOCATION;  Service: Cardiovascular;  Laterality: N/A;    CARDIAC CATHETERIZATION      CARDIAC ELECTROPHYSIOLOGY PROCEDURE N/A 4/11/2023    Procedure: Pacemaker DC new  Abbott aware;  Surgeon: Eric Low MD;  Location: [facility] CATH INVASIVE LOCATION;  Service: Cardiovascular;  Laterality: N/A;    CATARACT EXTRACTION      CHOLECYSTECTOMY      ENDOSCOPY N/A 8/20/2021    Procedure: ESOPHAGOGASTRODUODENOSCOPY;  Surgeon: Joao Cross MD;  Location: [facility] ENDOSCOPY;  Service: Gastroenterology;  Laterality: N/A;  submucosal ecchymosis 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    HYSTERECTOMY      REPLACEMENT TOTAL KNEE BILATERAL        Allergies:  Allergies   Allergen Reactions    Pravastatin Rash     rash      Vancomycin Hives    Rosuvastatin Calcium GI Intolerance and Diarrhea     Home Meds:  Current Meds:     Current Outpatient Medications:     albuterol sulfate  (90 Base) MCG/ACT inhaler, albuterol sulfate HFA 90 mcg/actuation aerosol inhaler  2 PUFFS EVERY 6 HOURS AS NEEDED FOR SHORTNESS OF BREATH/WHEEZING, Disp: , Rfl:     aspirin 81 MG EC tablet, Take 1 tablet by mouth Daily., Disp: 90 tablet, Rfl: 3    buPROPion XL (WELLBUTRIN XL) 300 MG 24 hr tablet, Take 1 tablet by mouth Daily., Disp: , Rfl:     hydroxychloroquine (PLAQUENIL) 200 MG tablet, Take 1 tablet by mouth Daily., Disp: , Rfl:     isosorbide mononitrate (IMDUR) 30 MG  "24 hr tablet, Take 1 tablet by mouth Every Morning., Disp: 90 tablet, Rfl: 3    levothyroxine (SYNTHROID, LEVOTHROID) 150 MCG tablet, Take 1 tablet by mouth Daily., Disp: , Rfl:     metoprolol succinate XL (TOPROL-XL) 50 MG 24 hr tablet, Take 1 tablet by mouth Daily., Disp: 90 tablet, Rfl: 3    mycophenolate (CELLCEPT) 250 MG capsule, mycophenolate mofetil 250 mg capsule  TAKE 1 CAPSULE BY MOUTH TWICE A DAY, Disp: , Rfl:     nitroglycerin (Nitrostat) 0.4 MG SL tablet, Place 1 tablet under the tongue Every 5 (Five) Minutes As Needed for Chest Pain. Take no more than 3 doses in 15 minutes., Disp: 30 tablet, Rfl: 12    PARoxetine (PAXIL) 30 MG tablet, Take 1 tablet by mouth Daily., Disp: , Rfl:   No current facility-administered medications for this visit.  Social History:   Social History     Tobacco Use    Smoking status: Never     Passive exposure: Past    Smokeless tobacco: Never   Substance Use Topics    Alcohol use: Not Currently      Family History:  Family History   Problem Relation Age of Onset    No Known Problems Mother     Lung disease Father               ROS  All other systems are negative         Objective:     Physical Exam  /82   Pulse 67   Ht 157.5 cm (62\")   Wt 85.3 kg (188 lb)   BMI 34.39 kg/m²   General:  Appears in no acute distress  Eyes: Sclera is anicteric,  conjunctiva is clear   HEENT:  No JVD.  No carotid bruits  Respiratory: Respirations regular and unlabored at rest.  Clear to auscultation  Cardiovascular: S1,S2 Regular rate and rhythm. .   Extremities: No digital clubbing or cyanosis, no edema  Skin: Color pink. Skin warm and dry to touch. No rashes  No xanthoma  Neuro: Alert and awake.    Lab Reviewed:         Eric Low MD  3/20/2024 11:07 EDT      EMR Dragon/Transcription:   \"Dictated utilizing Dragon dictation\".        "

## 2024-03-20 ENCOUNTER — OFFICE VISIT (OUTPATIENT)
Dept: CARDIOLOGY | Facility: CLINIC | Age: 79
End: 2024-03-20
Payer: MEDICARE

## 2024-03-20 ENCOUNTER — HOSPITAL ENCOUNTER (OUTPATIENT)
Dept: CARDIOLOGY | Facility: HOSPITAL | Age: 79
Discharge: HOME | End: 2024-03-20
Payer: MEDICARE

## 2024-03-20 ENCOUNTER — TELEPHONE (OUTPATIENT)
Dept: CARDIOLOGY | Facility: CLINIC | Age: 79
End: 2024-03-20

## 2024-03-20 VITALS
DIASTOLIC BLOOD PRESSURE: 82 MMHG | HEIGHT: 62 IN | BODY MASS INDEX: 34.6 KG/M2 | WEIGHT: 188 LBS | HEART RATE: 67 BPM | SYSTOLIC BLOOD PRESSURE: 122 MMHG

## 2024-03-20 DIAGNOSIS — I48.0 PAROXYSMAL ATRIAL FIBRILLATION: ICD-10-CM

## 2024-03-20 DIAGNOSIS — Z95.818 PRESENCE OF WATCHMAN LEFT ATRIAL APPENDAGE CLOSURE DEVICE: ICD-10-CM

## 2024-03-20 DIAGNOSIS — Z95.0 PACEMAKER: ICD-10-CM

## 2024-03-20 DIAGNOSIS — E78.5 DYSLIPIDEMIA: ICD-10-CM

## 2024-03-20 DIAGNOSIS — I25.10 CAD S/P PERCUTANEOUS CORONARY ANGIOPLASTY: ICD-10-CM

## 2024-03-20 DIAGNOSIS — E66.9 OBESITY (BMI 30-39.9): ICD-10-CM

## 2024-03-20 DIAGNOSIS — I10 ESSENTIAL HYPERTENSION: ICD-10-CM

## 2024-03-20 DIAGNOSIS — I25.118 CORONARY ARTERY DISEASE OF NATIVE ARTERY OF NATIVE HEART WITH STABLE ANGINA PECTORIS: Primary | ICD-10-CM

## 2024-03-20 DIAGNOSIS — R07.2 PRECORDIAL PAIN: ICD-10-CM

## 2024-03-20 DIAGNOSIS — Z98.61 CAD S/P PERCUTANEOUS CORONARY ANGIOPLASTY: ICD-10-CM

## 2024-03-20 DIAGNOSIS — R06.09 DYSPNEA ON EXERTION: ICD-10-CM

## 2024-03-20 LAB
BH CV REST NUCLEAR ISOTOPE DOSE: 10.9 MCI
BH CV STRESS COMMENTS STAGE 1: NORMAL
BH CV STRESS DOSE REGADENOSON STAGE 1: 0.4
BH CV STRESS DURATION MIN STAGE 1: 0
BH CV STRESS DURATION SEC STAGE 1: 10
BH CV STRESS NUCLEAR ISOTOPE DOSE: 32.2 MCI
BH CV STRESS PROTOCOL 1: NORMAL
BH CV STRESS RECOVERY BP: NORMAL MMHG
BH CV STRESS RECOVERY HR: 88 BPM
BH CV STRESS STAGE 1: 1
LV EF NUC BP: 85 %
MAXIMAL PREDICTED HEART RATE: 141 BPM
STRESS BASELINE BP: NORMAL MMHG
STRESS BASELINE HR: 67 BPM
STRESS TARGET HR: 120 BPM

## 2024-03-20 PROCEDURE — A9500 TC99M SESTAMIBI: HCPCS | Performed by: INTERNAL MEDICINE

## 2024-03-20 PROCEDURE — 1160F RVW MEDS BY RX/DR IN RCRD: CPT | Performed by: INTERNAL MEDICINE

## 2024-03-20 PROCEDURE — 78452 HT MUSCLE IMAGE SPECT MULT: CPT

## 2024-03-20 PROCEDURE — 25010000002 REGADENOSON 0.4 MG/5ML SOLUTION: Performed by: INTERNAL MEDICINE

## 2024-03-20 PROCEDURE — 3074F SYST BP LT 130 MM HG: CPT | Performed by: INTERNAL MEDICINE

## 2024-03-20 PROCEDURE — 0 TECHNETIUM SESTAMIBI: Performed by: INTERNAL MEDICINE

## 2024-03-20 PROCEDURE — 3079F DIAST BP 80-89 MM HG: CPT | Performed by: INTERNAL MEDICINE

## 2024-03-20 PROCEDURE — 99214 OFFICE O/P EST MOD 30 MIN: CPT | Performed by: INTERNAL MEDICINE

## 2024-03-20 PROCEDURE — 93017 CV STRESS TEST TRACING ONLY: CPT

## 2024-03-20 PROCEDURE — 1159F MED LIST DOCD IN RCRD: CPT | Performed by: INTERNAL MEDICINE

## 2024-03-20 RX ORDER — ROSUVASTATIN CALCIUM 10 MG/1
10 TABLET, COATED ORAL DAILY
COMMUNITY

## 2024-03-20 RX ORDER — REGADENOSON 0.08 MG/ML
0.4 INJECTION, SOLUTION INTRAVENOUS
Status: COMPLETED | OUTPATIENT
Start: 2024-03-20 | End: 2024-03-20

## 2024-03-20 RX ADMIN — TECHNETIUM TC 99M SESTAMIBI 1 DOSE: 1 INJECTION INTRAVENOUS at 09:52

## 2024-03-20 RX ADMIN — TECHNETIUM TC 99M SESTAMIBI 1 DOSE: 1 INJECTION INTRAVENOUS at 08:00

## 2024-03-20 RX ADMIN — REGADENOSON 0.4 MG: 0.08 INJECTION, SOLUTION INTRAVENOUS at 09:52

## 2024-03-20 NOTE — TELEPHONE ENCOUNTER
"Caller: Ally Ivory \"Ally Lorenzana\"    Relationship: Self    Best call back number: [phone]    What are your concerns: PT IS RETURNING A CALL TO LET DR. MOORE KNOW THAT SHE IS TAKING 10 MG OF THE ROSUVASTATIN  "
Updated list   
 @ [id number]

## 2024-03-20 NOTE — TELEPHONE ENCOUNTER
Per other phone note pt called back   She said she takes Rosuvastatin 10mg       Rosuvastatin is on pt's allergy list due to GI intolerance and Pravastatin is on allergy list due to rash

## 2024-03-21 NOTE — TELEPHONE ENCOUNTER
Spoke to patient about an appt and she wanted to me ask and double check if Dr. Low wants to adjust the dosage of the rosuvastatin or keep it the same? I told her I would have one of the medical assistants call her back and confirm.

## 2024-05-02 ENCOUNTER — APPOINTMENT (OUTPATIENT)
Dept: GENERAL RADIOLOGY | Facility: HOSPITAL | Age: 79
End: 2024-05-02
Payer: MEDICARE

## 2024-05-02 ENCOUNTER — HOSPITAL ENCOUNTER (OUTPATIENT)
Facility: HOSPITAL | Age: 79
Setting detail: OBSERVATION
Discharge: HOME | End: 2024-05-04
Attending: EMERGENCY MEDICINE | Admitting: EMERGENCY MEDICINE
Payer: MEDICARE

## 2024-05-02 DIAGNOSIS — R06.02 SHORTNESS OF BREATH: ICD-10-CM

## 2024-05-02 DIAGNOSIS — R06.2 WHEEZING: ICD-10-CM

## 2024-05-02 DIAGNOSIS — B34.8 PARAINFLUENZA INFECTION: Primary | ICD-10-CM

## 2024-05-02 LAB
ALBUMIN SERPL-MCNC: 4.5 G/DL (ref 3.5–5.2)
ALBUMIN/GLOB SERPL: 1.5 G/DL
ALP SERPL-CCNC: 82 U/L (ref 39–117)
ALT SERPL W P-5'-P-CCNC: 13 U/L (ref 1–33)
ANION GAP SERPL CALCULATED.3IONS-SCNC: 13 MMOL/L (ref 5–15)
AST SERPL-CCNC: 20 U/L (ref 1–32)
B PARAPERT DNA SPEC QL NAA+PROBE: NOT DETECTED
B PERT DNA SPEC QL NAA+PROBE: NOT DETECTED
BILIRUB SERPL-MCNC: 0.8 MG/DL (ref 0–1.2)
BUN SERPL-MCNC: 17 MG/DL (ref 8–23)
BUN/CREAT SERPL: 14.2 (ref 7–25)
C PNEUM DNA NPH QL NAA+NON-PROBE: NOT DETECTED
CALCIUM SPEC-SCNC: 9.7 MG/DL (ref 8.6–10.5)
CHLORIDE SERPL-SCNC: 100 MMOL/L (ref 98–107)
CO2 SERPL-SCNC: 25 MMOL/L (ref 22–29)
CREAT SERPL-MCNC: 1.2 MG/DL (ref 0.57–1)
DEPRECATED RDW RBC AUTO: 47.7 FL (ref 37–54)
EGFRCR SERPLBLD CKD-EPI 2021: 46.1 ML/MIN/1.73
EOSINOPHIL # BLD MANUAL: 0.13 10*3/MM3 (ref 0–0.4)
EOSINOPHIL NFR BLD MANUAL: 2 % (ref 0.3–6.2)
ERYTHROCYTE [DISTWIDTH] IN BLOOD BY AUTOMATED COUNT: 14 % (ref 12.3–15.4)
FLUAV SUBTYP SPEC NAA+PROBE: NOT DETECTED
FLUBV RNA ISLT QL NAA+PROBE: NOT DETECTED
GLOBULIN UR ELPH-MCNC: 3 GM/DL
GLUCOSE SERPL-MCNC: 95 MG/DL (ref 65–99)
HADV DNA SPEC NAA+PROBE: NOT DETECTED
HCOV 229E RNA SPEC QL NAA+PROBE: NOT DETECTED
HCOV HKU1 RNA SPEC QL NAA+PROBE: NOT DETECTED
HCOV NL63 RNA SPEC QL NAA+PROBE: NOT DETECTED
HCOV OC43 RNA SPEC QL NAA+PROBE: NOT DETECTED
HCT VFR BLD AUTO: 41.4 % (ref 34–46.6)
HGB BLD-MCNC: 13.6 G/DL (ref 12–15.9)
HMPV RNA NPH QL NAA+NON-PROBE: NOT DETECTED
HPIV1 RNA ISLT QL NAA+PROBE: NOT DETECTED
HPIV2 RNA SPEC QL NAA+PROBE: NOT DETECTED
HPIV3 RNA NPH QL NAA+PROBE: DETECTED
HPIV4 P GENE NPH QL NAA+PROBE: NOT DETECTED
LYMPHOCYTES # BLD MANUAL: 1.15 10*3/MM3 (ref 0.7–3.1)
LYMPHOCYTES NFR BLD MANUAL: 10 % (ref 5–12)
M PNEUMO IGG SER IA-ACNC: NOT DETECTED
MCH RBC QN AUTO: 30 PG (ref 26.6–33)
MCHC RBC AUTO-ENTMCNC: 32.9 G/DL (ref 31.5–35.7)
MCV RBC AUTO: 91.4 FL (ref 79–97)
MONOCYTES # BLD: 0.64 10*3/MM3 (ref 0.1–0.9)
NEUTROPHILS # BLD AUTO: 4.46 10*3/MM3 (ref 1.7–7)
NEUTROPHILS NFR BLD MANUAL: 70 % (ref 42.7–76)
NT-PROBNP SERPL-MCNC: 119.3 PG/ML (ref 0–1800)
PLAT MORPH BLD: NORMAL
PLATELET # BLD AUTO: 105 10*3/MM3 (ref 140–450)
PMV BLD AUTO: 11.7 FL (ref 6–12)
POTASSIUM SERPL-SCNC: 3.4 MMOL/L (ref 3.5–5.2)
PROCALCITONIN SERPL-MCNC: 0.05 NG/ML (ref 0–0.25)
PROT SERPL-MCNC: 7.5 G/DL (ref 6–8.5)
RBC # BLD AUTO: 4.53 10*6/MM3 (ref 3.77–5.28)
RBC MORPH BLD: NORMAL
RHINOVIRUS RNA SPEC NAA+PROBE: NOT DETECTED
RSV RNA NPH QL NAA+NON-PROBE: NOT DETECTED
SARS-COV-2 RNA NPH QL NAA+NON-PROBE: NOT DETECTED
SCAN SLIDE: NORMAL
SODIUM SERPL-SCNC: 138 MMOL/L (ref 136–145)
TROPONIN T SERPL HS-MCNC: 20 NG/L
VARIANT LYMPHS NFR BLD MANUAL: 15 % (ref 19.6–45.3)
VARIANT LYMPHS NFR BLD MANUAL: 3 % (ref 0–5)
WBC MORPH BLD: NORMAL
WBC NRBC COR # BLD AUTO: 6.37 10*3/MM3 (ref 3.4–10.8)

## 2024-05-02 PROCEDURE — 96374 THER/PROPH/DIAG INJ IV PUSH: CPT

## 2024-05-02 PROCEDURE — G0378 HOSPITAL OBSERVATION PER HR: HCPCS

## 2024-05-02 PROCEDURE — 0202U NFCT DS 22 TRGT SARS-COV-2: CPT | Performed by: EMERGENCY MEDICINE

## 2024-05-02 PROCEDURE — 99285 EMERGENCY DEPT VISIT HI MDM: CPT

## 2024-05-02 PROCEDURE — 94640 AIRWAY INHALATION TREATMENT: CPT

## 2024-05-02 PROCEDURE — 84484 ASSAY OF TROPONIN QUANT: CPT | Performed by: EMERGENCY MEDICINE

## 2024-05-02 PROCEDURE — 25810000003 SODIUM CHLORIDE 0.9 % SOLUTION 250 ML FLEX CONT: Performed by: EMERGENCY MEDICINE

## 2024-05-02 PROCEDURE — 25010000002 METHYLPREDNISOLONE PER 125 MG: Performed by: EMERGENCY MEDICINE

## 2024-05-02 PROCEDURE — 71045 X-RAY EXAM CHEST 1 VIEW: CPT

## 2024-05-02 PROCEDURE — 85007 BL SMEAR W/DIFF WBC COUNT: CPT | Performed by: EMERGENCY MEDICINE

## 2024-05-02 PROCEDURE — 85025 COMPLETE CBC W/AUTO DIFF WBC: CPT | Performed by: EMERGENCY MEDICINE

## 2024-05-02 PROCEDURE — 93005 ELECTROCARDIOGRAM TRACING: CPT

## 2024-05-02 PROCEDURE — 84145 PROCALCITONIN (PCT): CPT | Performed by: EMERGENCY MEDICINE

## 2024-05-02 PROCEDURE — 94799 UNLISTED PULMONARY SVC/PX: CPT

## 2024-05-02 PROCEDURE — 80053 COMPREHEN METABOLIC PANEL: CPT | Performed by: EMERGENCY MEDICINE

## 2024-05-02 PROCEDURE — 83880 ASSAY OF NATRIURETIC PEPTIDE: CPT | Performed by: EMERGENCY MEDICINE

## 2024-05-02 PROCEDURE — 25010000002 AZITHROMYCIN PER 500 MG: Performed by: EMERGENCY MEDICINE

## 2024-05-02 RX ORDER — METOPROLOL SUCCINATE 50 MG/1
50 TABLET, EXTENDED RELEASE ORAL DAILY
Status: DISCONTINUED | OUTPATIENT
Start: 2024-05-03 | End: 2024-05-04 | Stop reason: HOSPADM

## 2024-05-02 RX ORDER — BISACODYL 5 MG/1
5 TABLET, DELAYED RELEASE ORAL DAILY PRN
Status: DISCONTINUED | OUTPATIENT
Start: 2024-05-02 | End: 2024-05-04 | Stop reason: HOSPADM

## 2024-05-02 RX ORDER — SODIUM CHLORIDE 0.9 % (FLUSH) 0.9 %
10 SYRINGE (ML) INJECTION AS NEEDED
Status: DISCONTINUED | OUTPATIENT
Start: 2024-05-02 | End: 2024-05-04 | Stop reason: HOSPADM

## 2024-05-02 RX ORDER — METHYLPREDNISOLONE SODIUM SUCCINATE 125 MG/2ML
125 INJECTION, POWDER, LYOPHILIZED, FOR SOLUTION INTRAMUSCULAR; INTRAVENOUS ONCE
Status: COMPLETED | OUTPATIENT
Start: 2024-05-02 | End: 2024-05-02

## 2024-05-02 RX ORDER — MYCOPHENOLATE MOFETIL 250 MG/1
250 CAPSULE ORAL EVERY 12 HOURS SCHEDULED
Status: DISCONTINUED | OUTPATIENT
Start: 2024-05-02 | End: 2024-05-03

## 2024-05-02 RX ORDER — ROSUVASTATIN CALCIUM 10 MG/1
10 TABLET, COATED ORAL DAILY
Status: DISCONTINUED | OUTPATIENT
Start: 2024-05-03 | End: 2024-05-04 | Stop reason: HOSPADM

## 2024-05-02 RX ORDER — IPRATROPIUM BROMIDE AND ALBUTEROL SULFATE 2.5; .5 MG/3ML; MG/3ML
3 SOLUTION RESPIRATORY (INHALATION)
Status: DISCONTINUED | OUTPATIENT
Start: 2024-05-02 | End: 2024-05-04 | Stop reason: HOSPADM

## 2024-05-02 RX ORDER — SODIUM CHLORIDE 9 MG/ML
40 INJECTION, SOLUTION INTRAVENOUS AS NEEDED
Status: DISCONTINUED | OUTPATIENT
Start: 2024-05-02 | End: 2024-05-04 | Stop reason: HOSPADM

## 2024-05-02 RX ORDER — LEVOTHYROXINE SODIUM 0.15 MG/1
150 TABLET ORAL
Status: DISCONTINUED | OUTPATIENT
Start: 2024-05-03 | End: 2024-05-04 | Stop reason: HOSPADM

## 2024-05-02 RX ORDER — AMOXICILLIN 250 MG
2 CAPSULE ORAL 2 TIMES DAILY PRN
Status: DISCONTINUED | OUTPATIENT
Start: 2024-05-02 | End: 2024-05-04 | Stop reason: HOSPADM

## 2024-05-02 RX ORDER — ISOSORBIDE MONONITRATE 30 MG/1
30 TABLET, EXTENDED RELEASE ORAL DAILY
Status: DISCONTINUED | OUTPATIENT
Start: 2024-05-03 | End: 2024-05-04 | Stop reason: HOSPADM

## 2024-05-02 RX ORDER — NITROGLYCERIN 0.4 MG/1
0.4 TABLET SUBLINGUAL
Status: DISCONTINUED | OUTPATIENT
Start: 2024-05-02 | End: 2024-05-04 | Stop reason: HOSPADM

## 2024-05-02 RX ORDER — SODIUM CHLORIDE 0.9 % (FLUSH) 0.9 %
10 SYRINGE (ML) INJECTION EVERY 12 HOURS SCHEDULED
Status: DISCONTINUED | OUTPATIENT
Start: 2024-05-02 | End: 2024-05-04 | Stop reason: HOSPADM

## 2024-05-02 RX ORDER — POLYETHYLENE GLYCOL 3350 17 G/17G
17 POWDER, FOR SOLUTION ORAL DAILY PRN
Status: DISCONTINUED | OUTPATIENT
Start: 2024-05-02 | End: 2024-05-04 | Stop reason: HOSPADM

## 2024-05-02 RX ORDER — BISACODYL 10 MG
10 SUPPOSITORY, RECTAL RECTAL DAILY PRN
Status: DISCONTINUED | OUTPATIENT
Start: 2024-05-02 | End: 2024-05-04 | Stop reason: HOSPADM

## 2024-05-02 RX ORDER — IPRATROPIUM BROMIDE AND ALBUTEROL SULFATE 2.5; .5 MG/3ML; MG/3ML
3 SOLUTION RESPIRATORY (INHALATION) ONCE
Status: COMPLETED | OUTPATIENT
Start: 2024-05-02 | End: 2024-05-02

## 2024-05-02 RX ORDER — BUPROPION HYDROCHLORIDE 150 MG/1
300 TABLET ORAL DAILY
Status: DISCONTINUED | OUTPATIENT
Start: 2024-05-03 | End: 2024-05-04 | Stop reason: HOSPADM

## 2024-05-02 RX ORDER — ASPIRIN 81 MG/1
81 TABLET ORAL DAILY
Status: DISCONTINUED | OUTPATIENT
Start: 2024-05-04 | End: 2024-05-03

## 2024-05-02 RX ADMIN — AZITHROMYCIN MONOHYDRATE 500 MG: 500 INJECTION, POWDER, LYOPHILIZED, FOR SOLUTION INTRAVENOUS at 20:11

## 2024-05-02 RX ADMIN — IPRATROPIUM BROMIDE AND ALBUTEROL SULFATE 3 ML: .5; 3 SOLUTION RESPIRATORY (INHALATION) at 18:32

## 2024-05-02 RX ADMIN — METHYLPREDNISOLONE SODIUM SUCCINATE 125 MG: 125 INJECTION, POWDER, FOR SOLUTION INTRAMUSCULAR; INTRAVENOUS at 20:11

## 2024-05-02 NOTE — ED PROVIDER NOTES
Subjective   History of Present Illness  78-year-old female with a history of pulmonary fibrosis, interstitial lung disease, CAD, A-fib presents for feeling rundown, shortness of breath, productive cough.  Symptoms going on for couple weeks.  No known fevers.  Has had some diarrhea but no vomiting.    Does not have any portable oxygen currently.  Only has her nighttime available.  Very short of breath whenever she has been trying to  Review of Systems  See HPI.  Past Medical History:   Diagnosis Date    Atrial fibrillation     Coronary artery disease     Dyslipidemia     Hypertension     Hypothyroidism     ILD (interstitial lung disease)     Lung fibrosis     Lupus     Lupus     Raynaud's disease        Allergies   Allergen Reactions    Pravastatin Rash     rash      Vancomycin Hives    Rosuvastatin Calcium GI Intolerance and Diarrhea       Past Surgical History:   Procedure Laterality Date    ATRIAL APPENDAGE EXCLUSION LEFT WITH TRANSESOPHAGEAL ECHOCARDIOGRAM N/A 8/19/2021    Procedure: Atrial Appendage Occlusion;  Surgeon: Eric Low MD;  Location: [facility] CATH INVASIVE LOCATION;  Service: Cardiovascular;  Laterality: N/A;    ATRIAL APPENDAGE EXCLUSION LEFT WITH TRANSESOPHAGEAL ECHOCARDIOGRAM N/A 8/19/2021    Procedure: Atrial Appendage Occlusion;  Surgeon: Francisco Garza MD;  Location: [facility] CATH INVASIVE LOCATION;  Service: Cardiovascular;  Laterality: N/A;    CARDIAC CATHETERIZATION      CARDIAC ELECTROPHYSIOLOGY PROCEDURE N/A 4/11/2023    Procedure: Pacemaker DC new  Abbott aware;  Surgeon: Eric Low MD;  Location: [facility] CATH INVASIVE LOCATION;  Service: Cardiovascular;  Laterality: N/A;    CATARACT EXTRACTION      CHOLECYSTECTOMY      ENDOSCOPY N/A 8/20/2021    Procedure: ESOPHAGOGASTRODUODENOSCOPY;  Surgeon: Joao Cross MD;  Location: [facility] ENDOSCOPY;  Service: Gastroenterology;  Laterality: N/A;  submucosal ecchymosis length of esophagus with vocal cord  "trauma    HYSTERECTOMY      REPLACEMENT TOTAL KNEE BILATERAL         Family History   Problem Relation Age of Onset    No Known Problems Mother     Lung disease Father        Social History     Socioeconomic History    Marital status:    Tobacco Use    Smoking status: Never     Passive exposure: Past    Smokeless tobacco: Never   Vaping Use    Vaping status: Never Used   Substance and Sexual Activity    Alcohol use: Not Currently    Drug use: Never    Sexual activity: Defer           Objective   Physical Exam  No acute distress, bilateral expiratory wheezes, no tachypnea, abdomen soft and nontender, intact distal pulses, alert and oriented, moist oral mucosa, regular rate and rhythm.  Procedures           ED Course      /51 (BP Location: Left arm, Patient Position: Lying)   Pulse 75   Temp 97.8 °F (36.6 °C) (Oral)   Resp 18   Ht 160 cm (63\")   Wt 79.4 kg (175 lb)   SpO2 95%   BMI 31.00 kg/m²   Labs Reviewed   RESPIRATORY PANEL PCR W/ COVID-19 (SARS-COV-2), NP SWAB IN UTM/VTP, 2 HR TAT - Abnormal; Notable for the following components:       Result Value    Parainfluenza Virus 3 Detected (*)     All other components within normal limits    Narrative:     In the setting of a positive respiratory panel with a viral infection PLUS a negative procalcitonin without other underlying concern for bacterial infection, consider observing off antibiotics or discontinuation of antibiotics and continue supportive care. If the respiratory panel is positive for atypical bacterial infection (Bordetella pertussis, Chlamydophila pneumoniae, or Mycoplasma pneumoniae), consider antibiotic de-escalation to target atypical bacterial infection.   COMPREHENSIVE METABOLIC PANEL - Abnormal; Notable for the following components:    Creatinine 1.20 (*)     Potassium 3.4 (*)     eGFR 46.1 (*)     All other components within normal limits    Narrative:     GFR Normal >60  Chronic Kidney Disease <60  Kidney Failure <15    The " GFR formula is only valid for adults with stable renal function between ages 18 and 70.   SINGLE HS TROPONIN T - Abnormal; Notable for the following components:    HS Troponin T 20 (*)     All other components within normal limits    Narrative:     High Sensitive Troponin T Reference Range:  <14.0 ng/L- Negative Female for AMI  <22.0 ng/L- Negative Male for AMI  >=14 - Abnormal Female indicating possible myocardial injury.  >=22 - Abnormal Male indicating possible myocardial injury.   Clinicians would have to utilize clinical acumen, EKG, Troponin, and serial changes to determine if it is an Acute Myocardial Infarction or myocardial injury due to an underlying chronic condition.        CBC WITH AUTO DIFFERENTIAL - Abnormal; Notable for the following components:    Platelets 105 (*)     All other components within normal limits   MANUAL DIFFERENTIAL - Abnormal; Notable for the following components:    Lymphocyte % 15.0 (*)     All other components within normal limits   BNP (IN-HOUSE) - Normal    Narrative:     This assay is used as an aid in the diagnosis of individuals suspected of having heart failure. It can be used as an aid in the diagnosis of acute decompensated heart failure (ADHF) in patients presenting with signs and symptoms of ADHF to the emergency department (ED). In addition, NT-proBNP of <300 pg/mL indicates ADHF is not likely.    Age Range Result Interpretation  NT-proBNP Concentration (pg/mL:      <50             Positive            >450                   Gray                 300-450                    Negative             <300    50-75           Positive            >900                  Gray                300-900                  Negative            <300      >75             Positive            >1800                  Gray                300-1800                  Negative            <300   PROCALCITONIN - Normal    Narrative:     As a Marker for Sepsis (Non-Neonates):    1. <0.5 ng/mL represents a  "low risk of severe sepsis and/or septic shock.  2. >2 ng/mL represents a high risk of severe sepsis and/or septic shock.    As a Marker for Lower Respiratory Tract Infections that require antibiotic therapy:    PCT on Admission    Antibiotic Therapy       6-12 Hrs later    >0.5                Strongly Recommended  >0.25 - <0.5        Recommended   0.1 - 0.25          Discouraged              Remeasure/reassess PCT  <0.1                Strongly Discouraged     Remeasure/reassess PCT    As 28 day mortality risk marker: \"Change in Procalcitonin Result\" (>80% or <=80%) if Day 0 (or Day 1) and Day 4 values are available. Refer to http://www.[facility] Online Information Technologypct-calculator.com    Change in PCT <=80%  A decrease of PCT levels below or equal to 80% defines a positive change in PCT test result representing a higher risk for 28-day all-cause mortality of patients diagnosed with severe sepsis for septic shock.    Change in PCT >80%  A decrease of PCT levels of more than 80% defines a negative change in PCT result representing a lower risk for 28-day all-cause mortality of patients diagnosed with severe sepsis or septic shock.      SCAN SLIDE   CBC (NO DIFF)   BASIC METABOLIC PANEL   CBC AND DIFFERENTIAL    Narrative:     The following orders were created for panel order CBC & Differential.  Procedure                               Abnormality         Status                     ---------                               -----------         ------                     CBC Auto Differential[[id number]]        Abnormal            Final result               Scan Slide[[id number]]                                       Final result                 Please view results for these tests on the individual orders.     Medications   sodium chloride 0.9 % flush 10 mL (has no administration in time range)   sodium chloride 0.9 % flush 10 mL (has no administration in time range)   sodium chloride 0.9 % flush 10 mL (has no administration in time range)   sodium " chloride 0.9 % infusion 40 mL (has no administration in time range)   sennosides-docusate (PERICOLACE) 8.6-50 MG per tablet 2 tablet (has no administration in time range)     And   polyethylene glycol (MIRALAX) packet 17 g (has no administration in time range)     And   bisacodyl (DULCOLAX) EC tablet 5 mg (has no administration in time range)     And   bisacodyl (DULCOLAX) suppository 10 mg (has no administration in time range)   ipratropium-albuterol (DUO-NEB) nebulizer solution 3 mL (3 mL Nebulization Not Given 5/2/24 2002)   aspirin EC tablet 81 mg (has no administration in time range)   buPROPion XL (WELLBUTRIN XL) 24 hr tablet 300 mg (has no administration in time range)   isosorbide mononitrate (IMDUR) 24 hr tablet 30 mg (has no administration in time range)   levothyroxine (SYNTHROID, LEVOTHROID) tablet 150 mcg (has no administration in time range)   metoprolol succinate XL (TOPROL-XL) 24 hr tablet 50 mg (has no administration in time range)   mycophenolate (CELLCEPT) capsule 250 mg (has no administration in time range)   nitroglycerin (NITROSTAT) SL tablet 0.4 mg (has no administration in time range)   PARoxetine (PAXIL) tablet 30 mg (has no administration in time range)   rosuvastatin (CRESTOR) tablet 10 mg (has no administration in time range)   ipratropium-albuterol (DUO-NEB) nebulizer solution 3 mL (3 mL Nebulization Given 5/2/24 1832)   methylPREDNISolone sodium succinate (SOLU-Medrol) injection 125 mg (125 mg Intravenous Given 5/2/24 2011)   azithromycin (ZITHROMAX) 500 mg in sodium chloride 0.9 % 250 mL IVPB-VTB (500 mg Intravenous New Bag 5/2/24 2011)     XR Chest 1 View    Result Date: 5/2/2024  Mild vascular congestion. Electronically Signed: Jerad Meza MD  5/2/2024 6:17 PM EDT  Workstation ID: [id number]                                          Medical Decision Making  Problems Addressed:  Parainfluenza infection: complicated acute illness or injury  Shortness of breath: complicated acute illness  or injury  Wheezing: complicated acute illness or injury    Amount and/or Complexity of Data Reviewed  Labs: ordered.  Radiology: ordered.    Risk  OTC drugs.  Prescription drug management.  Decision regarding hospitalization.    EKG interpretation: 5:21 PM, rate 80, normal sinus rhythm, normal axis, no acute ischemic changes.    My interpretation chest x-ray is no focal feature pneumothorax.  See system for radiology interpretation.    Per influenza positive.  Breathing wheezing somewhat improved with DuoNeb.  Does not have home oxygen available.  Will place in observation for case management consult in the morning.    Final diagnoses:   Parainfluenza infection   Shortness of breath   Wheezing       ED Disposition  ED Disposition       ED Disposition   Decision to Admit    Condition   --    Comment   --               No follow-up provider specified.       Medication List      No changes were made to your prescriptions during this visit.            Jose Cavazos MD  05/02/24 3834

## 2024-05-03 LAB
ANION GAP SERPL CALCULATED.3IONS-SCNC: 9 MMOL/L (ref 5–15)
BUN SERPL-MCNC: 15 MG/DL (ref 8–23)
BUN/CREAT SERPL: 17.9 (ref 7–25)
CALCIUM SPEC-SCNC: 9.5 MG/DL (ref 8.6–10.5)
CHLORIDE SERPL-SCNC: 105 MMOL/L (ref 98–107)
CO2 SERPL-SCNC: 27 MMOL/L (ref 22–29)
CREAT SERPL-MCNC: 0.84 MG/DL (ref 0.57–1)
DEPRECATED RDW RBC AUTO: 47.1 FL (ref 37–54)
EGFRCR SERPLBLD CKD-EPI 2021: 70.8 ML/MIN/1.73
ERYTHROCYTE [DISTWIDTH] IN BLOOD BY AUTOMATED COUNT: 13.8 % (ref 12.3–15.4)
GLUCOSE SERPL-MCNC: 156 MG/DL (ref 65–99)
HCT VFR BLD AUTO: 40.6 % (ref 34–46.6)
HGB BLD-MCNC: 13.1 G/DL (ref 12–15.9)
MCH RBC QN AUTO: 29.6 PG (ref 26.6–33)
MCHC RBC AUTO-ENTMCNC: 32.3 G/DL (ref 31.5–35.7)
MCV RBC AUTO: 91.9 FL (ref 79–97)
PLATELET # BLD AUTO: 106 10*3/MM3 (ref 140–450)
PMV BLD AUTO: 11.9 FL (ref 6–12)
POTASSIUM SERPL-SCNC: 4.6 MMOL/L (ref 3.5–5.2)
QT INTERVAL: 435 MS
QTC INTERVAL: 500 MS
RBC # BLD AUTO: 4.42 10*6/MM3 (ref 3.77–5.28)
SODIUM SERPL-SCNC: 141 MMOL/L (ref 136–145)
TROPONIN T SERPL HS-MCNC: 13 NG/L
WBC NRBC COR # BLD AUTO: 4.19 10*3/MM3 (ref 3.4–10.8)

## 2024-05-03 PROCEDURE — G0378 HOSPITAL OBSERVATION PER HR: HCPCS

## 2024-05-03 PROCEDURE — 85027 COMPLETE CBC AUTOMATED: CPT | Performed by: EMERGENCY MEDICINE

## 2024-05-03 PROCEDURE — 94761 N-INVAS EAR/PLS OXIMETRY MLT: CPT

## 2024-05-03 PROCEDURE — 84484 ASSAY OF TROPONIN QUANT: CPT | Performed by: PHYSICIAN ASSISTANT

## 2024-05-03 PROCEDURE — 94799 UNLISTED PULMONARY SVC/PX: CPT

## 2024-05-03 PROCEDURE — 94664 DEMO&/EVAL PT USE INHALER: CPT

## 2024-05-03 PROCEDURE — 80048 BASIC METABOLIC PNL TOTAL CA: CPT | Performed by: EMERGENCY MEDICINE

## 2024-05-03 RX ORDER — SODIUM CHLORIDE 0.9 % (FLUSH) 0.9 %
10 SYRINGE (ML) INJECTION EVERY 12 HOURS SCHEDULED
Status: DISCONTINUED | OUTPATIENT
Start: 2024-05-03 | End: 2024-05-04 | Stop reason: HOSPADM

## 2024-05-03 RX ORDER — ONDANSETRON 2 MG/ML
4 INJECTION INTRAMUSCULAR; INTRAVENOUS EVERY 6 HOURS PRN
Status: DISCONTINUED | OUTPATIENT
Start: 2024-05-03 | End: 2024-05-04 | Stop reason: HOSPADM

## 2024-05-03 RX ORDER — SODIUM CHLORIDE 9 MG/ML
40 INJECTION, SOLUTION INTRAVENOUS AS NEEDED
Status: DISCONTINUED | OUTPATIENT
Start: 2024-05-03 | End: 2024-05-04 | Stop reason: HOSPADM

## 2024-05-03 RX ORDER — MYCOPHENOLATE MOFETIL 250 MG/1
250 CAPSULE ORAL EVERY 12 HOURS SCHEDULED
Status: DISCONTINUED | OUTPATIENT
Start: 2024-05-03 | End: 2024-05-03

## 2024-05-03 RX ORDER — AZITHROMYCIN 250 MG/1
250 TABLET, FILM COATED ORAL
Status: DISCONTINUED | OUTPATIENT
Start: 2024-05-03 | End: 2024-05-04 | Stop reason: HOSPADM

## 2024-05-03 RX ORDER — ALUMINA, MAGNESIA, AND SIMETHICONE 2400; 2400; 240 MG/30ML; MG/30ML; MG/30ML
15 SUSPENSION ORAL EVERY 6 HOURS PRN
Status: DISCONTINUED | OUTPATIENT
Start: 2024-05-03 | End: 2024-05-04 | Stop reason: HOSPADM

## 2024-05-03 RX ORDER — ONDANSETRON 4 MG/1
4 TABLET, ORALLY DISINTEGRATING ORAL EVERY 6 HOURS PRN
Status: DISCONTINUED | OUTPATIENT
Start: 2024-05-03 | End: 2024-05-04 | Stop reason: HOSPADM

## 2024-05-03 RX ORDER — ACETAMINOPHEN 325 MG/1
650 TABLET ORAL EVERY 4 HOURS PRN
Status: DISCONTINUED | OUTPATIENT
Start: 2024-05-03 | End: 2024-05-04 | Stop reason: HOSPADM

## 2024-05-03 RX ORDER — ASPIRIN 81 MG/1
81 TABLET ORAL DAILY
Status: DISCONTINUED | OUTPATIENT
Start: 2024-05-03 | End: 2024-05-04 | Stop reason: HOSPADM

## 2024-05-03 RX ORDER — SODIUM CHLORIDE 0.9 % (FLUSH) 0.9 %
10 SYRINGE (ML) INJECTION AS NEEDED
Status: DISCONTINUED | OUTPATIENT
Start: 2024-05-03 | End: 2024-05-04 | Stop reason: HOSPADM

## 2024-05-03 RX ADMIN — ROSUVASTATIN 10 MG: 10 TABLET, FILM COATED ORAL at 09:19

## 2024-05-03 RX ADMIN — Medication 10 ML: at 20:37

## 2024-05-03 RX ADMIN — ASPIRIN 81 MG: 81 TABLET, COATED ORAL at 09:19

## 2024-05-03 RX ADMIN — BUPROPION HYDROCHLORIDE 300 MG: 150 TABLET, EXTENDED RELEASE ORAL at 09:19

## 2024-05-03 RX ADMIN — LEVOTHYROXINE SODIUM 150 MCG: 0.15 TABLET ORAL at 05:43

## 2024-05-03 RX ADMIN — ISOSORBIDE MONONITRATE 30 MG: 30 TABLET, EXTENDED RELEASE ORAL at 09:19

## 2024-05-03 RX ADMIN — IPRATROPIUM BROMIDE AND ALBUTEROL SULFATE 3 ML: .5; 3 SOLUTION RESPIRATORY (INHALATION) at 19:55

## 2024-05-03 RX ADMIN — PAROXETINE 30 MG: 10 TABLET, FILM COATED ORAL at 09:22

## 2024-05-03 RX ADMIN — AZITHROMYCIN DIHYDRATE 250 MG: 250 TABLET ORAL at 20:36

## 2024-05-03 RX ADMIN — METOPROLOL SUCCINATE 50 MG: 50 TABLET, EXTENDED RELEASE ORAL at 09:19

## 2024-05-03 RX ADMIN — IPRATROPIUM BROMIDE AND ALBUTEROL SULFATE 3 ML: .5; 3 SOLUTION RESPIRATORY (INHALATION) at 11:30

## 2024-05-03 RX ADMIN — Medication 10 ML: at 10:54

## 2024-05-03 RX ADMIN — Medication 10 ML: at 09:22

## 2024-05-03 NOTE — PLAN OF CARE
Goal Outcome Evaluation:           Progress: no change  Outcome Evaluation: Pt admitted to 231 for SOB r/t parainfluenza and CF.  Pt resting when I entered the room.  Will place pt on oxygen over night for comfort.  Pt states she uses every night when she sleeps and sometimes in the morning.

## 2024-05-03 NOTE — H&P
Critical access hospital Observation Unit H&P    Patient Name: Ally Ivory  : 1945  MRN: [id number]  Primary Care Physician: Gael Vasquez MD  Date of admission: 2024     Patient Care Team:  Gael Vasquez MD as PCP - General  Eric Low MD as Consulting Physician (Cardiology)          Subjective   History Present Illness     Chief Complaint:   Chief Complaint   Patient presents with    Shortness of Breath     Shortness of breath    Shortness of Breath  Associated symptoms include sputum production. Pertinent negatives include no fever or vomiting.       ED  78-year-old female with a history of pulmonary fibrosis, interstitial lung disease, CAD, A-fib presents for feeling rundown, shortness of breath, productive cough.  Symptoms going on for couple weeks.  No known fevers.  Has had some diarrhea but no vomiting.       Observation 5/3/24  Pt concurs with er hpi. Pt receiving steroids and duonebs. Pt still feels soa. Will keep again overnight and intend to dc in am after a few more doses of iv steroids.     Review of Systems   Constitutional: Negative for fever.   Respiratory:  Positive for cough, shortness of breath and sputum production.    Gastrointestinal:  Positive for diarrhea and nausea. Negative for vomiting.             Personal History     Past Medical History:   Past Medical History:   Diagnosis Date    Atrial fibrillation     Coronary artery disease     Dyslipidemia     Hypertension     Hypothyroidism     ILD (interstitial lung disease)     Lung fibrosis     Lupus     Lupus     Raynaud's disease        Surgical History:      Past Surgical History:   Procedure Laterality Date    ATRIAL APPENDAGE EXCLUSION LEFT WITH TRANSESOPHAGEAL ECHOCARDIOGRAM N/A 2021    Procedure: Atrial Appendage Occlusion;  Surgeon: Eric Low MD;  Location: [facility] INVASIVE LOCATION;  Service: Cardiovascular;  Laterality: N/A;    ATRIAL APPENDAGE EXCLUSION LEFT WITH TRANSESOPHAGEAL  ECHOCARDIOGRAM N/A 8/19/2021    Procedure: Atrial Appendage Occlusion;  Surgeon: Francisco Garza MD;  Location: [facility] CATH INVASIVE LOCATION;  Service: Cardiovascular;  Laterality: N/A;    CARDIAC CATHETERIZATION      CARDIAC ELECTROPHYSIOLOGY PROCEDURE N/A 4/11/2023    Procedure: Pacemaker DC new  Abbott aware;  Surgeon: Eric Low MD;  Location: [facility] CATH INVASIVE LOCATION;  Service: Cardiovascular;  Laterality: N/A;    CATARACT EXTRACTION      CHOLECYSTECTOMY      ENDOSCOPY N/A 8/20/2021    Procedure: ESOPHAGOGASTRODUODENOSCOPY;  Surgeon: Joao Cross MD;  Location: [facility] ENDOSCOPY;  Service: Gastroenterology;  Laterality: N/A;  submucosal ecchymosis 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    HYSTERECTOMY      REPLACEMENT TOTAL KNEE BILATERAL             Family History: family history includes Lung disease in her father; No Known Problems in her mother. Otherwise pertinent FHx was reviewed and unremarkable.     Social History:  reports that she has never smoked. She has been exposed to tobacco smoke. She has never used smokeless tobacco. She reports that she does not currently use alcohol. She reports that she does not use drugs.      Medications:  Prior to Admission medications    Medication Sig Start Date End Date Taking? Authorizing Provider   aspirin 81 MG EC tablet Take 1 tablet by mouth Daily. 3/6/24  Yes Eric Low MD   buPROPion XL (WELLBUTRIN XL) 300 MG 24 hr tablet Take 1 tablet by mouth Daily. 6/27/23  Yes Sheila Caputo MD   isosorbide mononitrate (IMDUR) 30 MG 24 hr tablet Take 1 tablet by mouth Every Morning. 9/5/23  Yes Eric Low MD   levothyroxine (SYNTHROID, LEVOTHROID) 150 MCG tablet Take 1 tablet by mouth Daily. 8/4/23  Yes Sheila Caputo MD   metoprolol succinate XL (TOPROL-XL) 50 MG 24 hr tablet Take 1 tablet by mouth Daily. 3/6/24  Yes Eric Low MD   PARoxetine (PAXIL) 30 MG tablet Take 1  tablet by mouth Daily. 8/23/21  Yes Maggy Sanchez APRN   rosuvastatin (CRESTOR) 10 MG tablet Take 1 tablet by mouth Daily.   Yes ProviderSheila MD   albuterol sulfate  (90 Base) MCG/ACT inhaler albuterol sulfate HFA 90 mcg/actuation aerosol inhaler   2 PUFFS EVERY 6 HOURS AS NEEDED FOR SHORTNESS OF BREATH/WHEEZING    ProviderSheila MD   hydroxychloroquine (PLAQUENIL) 200 MG tablet Take 1 tablet by mouth Daily. 6/27/18   Sheila Caputo MD   mycophenolate (CELLCEPT) 250 MG capsule mycophenolate mofetil 250 mg capsule   TAKE 1 CAPSULE BY MOUTH TWICE A DAY    ProviderSheila MD   nitroglycerin (Nitrostat) 0.4 MG SL tablet Place 1 tablet under the tongue Every 5 (Five) Minutes As Needed for Chest Pain. Take no more than 3 doses in 15 minutes. 3/6/24   Eric Low MD       Allergies:    Allergies   Allergen Reactions    Pravastatin Rash     rash      Vancomycin Hives    Rosuvastatin Calcium GI Intolerance and Diarrhea       Objective   Objective     Vital Signs  Temp:  [97.2 °F (36.2 °C)-97.8 °F (36.6 °C)] 97.2 °F (36.2 °C)  Heart Rate:  [68-94] 94  Resp:  [14-22] 18  BP: (100-158)/(46-87) 129/64  SpO2:  [93 %-100 %] 98 %  on  Flow (L/min):  [3-5] 5;   Device (Oxygen Therapy): room air  Body mass index is 31 kg/m².    Physical Exam  Constitutional:       Appearance: Normal appearance.   Cardiovascular:      Rate and Rhythm: Normal rate and regular rhythm.      Pulses: Normal pulses.      Heart sounds: Normal heart sounds.   Pulmonary:      Effort: Pulmonary effort is normal.      Comments: Diminished breath sounds  Neurological:      General: No focal deficit present.      Mental Status: She is alert and oriented to person, place, and time.   Psychiatric:         Mood and Affect: Mood normal.         Behavior: Behavior normal.         Results Review:  I have personally reviewed most recent cardiac tracings, lab results, microbiology results, and radiology images and  interpretations and agree with findings, most notably: cbc, trop, bnp, .    Results from last 7 days   Lab Units 05/03/24  0559   WBC 10*3/mm3 4.19   HEMOGLOBIN g/dL 13.1   HEMATOCRIT % 40.6   PLATELETS 10*3/mm3 106*     Results from last 7 days   Lab Units 05/03/24  0559 05/02/24  1818   SODIUM mmol/L 141 138   POTASSIUM mmol/L 4.6 3.4*   CHLORIDE mmol/L 105 100   CO2 mmol/L 27.0 25.0   BUN mg/dL 15 17   CREATININE mg/dL 0.84 1.20*   GLUCOSE mg/dL 156* 95   CALCIUM mg/dL 9.5 9.7   ALK PHOS U/L  --  82   ALT (SGPT) U/L  --  13   AST (SGOT) U/L  --  20   HSTROP T ng/L  --  20*   PROBNP pg/mL  --  119.3   PROCALCITONIN ng/mL  --  0.05     Estimated Creatinine Clearance: 54.2 mL/min (by C-G formula based on SCr of 0.84 mg/dL).  Brief Urine Lab Results       None            Microbiology Results (last 10 days)       Procedure Component Value - Date/Time    Respiratory Panel PCR w/COVID-19(SARS-CoV-2) ROBYN/GIO/AL/PAD/COR/JACQUELINE In-House, NP Swab in UTM/VTM, 2 HR TAT - Swab, Nasopharynx [[id number]]  (Abnormal) Collected: 05/02/24 1818    Lab Status: Final result Specimen: Swab from Nasopharynx Updated: 05/02/24 1923     ADENOVIRUS, PCR Not Detected     Coronavirus 229E Not Detected     Coronavirus HKU1 Not Detected     Coronavirus NL63 Not Detected     Coronavirus OC43 Not Detected     COVID19 Not Detected     Human Metapneumovirus Not Detected     Human Rhinovirus/Enterovirus Not Detected     Influenza A PCR Not Detected     Influenza B PCR Not Detected     Parainfluenza Virus 1 Not Detected     Parainfluenza Virus 2 Not Detected     Parainfluenza Virus 3 Detected     Parainfluenza Virus 4 Not Detected     RSV, PCR Not Detected     Bordetella pertussis pcr Not Detected     Bordetella parapertussis PCR Not Detected     Chlamydophila pneumoniae PCR Not Detected     Mycoplasma pneumo by PCR Not Detected    Narrative:      In the setting of a positive respiratory panel with a viral infection PLUS a negative procalcitonin without  other underlying concern for bacterial infection, consider observing off antibiotics or discontinuation of antibiotics and continue supportive care. If the respiratory panel is positive for atypical bacterial infection (Bordetella pertussis, Chlamydophila pneumoniae, or Mycoplasma pneumoniae), consider antibiotic de-escalation to target atypical bacterial infection.            ECG/EMG Results (most recent)       Procedure Component Value Units Date/Time    ECG 12 Lead Dyspnea [[id number]] Collected: 05/02/24 1721     Updated: 05/03/24 0853     QT Interval 435 ms      QTC Interval 500 ms     Narrative:      HEART RATE= 80  bpm  RR Interval= 756  ms  VT Interval= 167  ms  P Horizontal Axis= 25  deg  P Front Axis= 47  deg  QRSD Interval= 104  ms  QT Interval= 435  ms  QTcB= 500  ms  QRS Axis= -9  deg  T Wave Axis= 56  deg  - ABNORMAL ECG -  Sinus rhythm  Anteroseptal infarct, age indeterminate  When compared with ECG of 12-Apr-2023 5:51:26,  Significant axis, voltage or hypertrophy change  Electronically Signed By: Jose Cavazos (AL) 03-May-2024 08:53:29  Date and Time of Study: 2024-05-02 17:21:48    Telemetry Scan [[id number]] Resulted: 05/02/24     Updated: 05/03/24 1040                Results for orders placed during the hospital encounter of 10/04/21    Adult Transesophageal Echo (TON) W/ Cont if Necessary Per Protocol    Interpretation Summary  TON   procedure note    Procedure:  TON    Indication:   georgette Adams    Date of procedure  : 10/4/2021    :  Dr. Eric Low    Description of procedure:    Under conscious sedation given by anesthesiology and local anesthesia, a TON probe was advanced into the esophagus and into the stomach 2D, M-mode, color Doppler images were obtained..  Patient tolerated procedure well complications well.    Complications: none    Results:    Normal LV size and contractility LV contractility, EF of 60%  Normal RV size  Biatrial enlargement seen.  Left atrial  appendage has watchman left atrial appendage occluder device seated well.  Aortic valve, mitral valve, tricuspid valve appears structurally normal, moderate aortic, mitral, tricuspid seen.  Elevated RV systolic pressures at 58 mmHg consistent with pulmonary hypertension seen  No vegetation seen  No pericardial effusion seen.  Proximal aorta appears normal in size.  Mild aortic plaque seen      Recommendations/plan:    Clinical correlation recommended      XR Chest 1 View    Result Date: 5/2/2024  Mild vascular congestion. Electronically Signed: Jerad Meza MD  5/2/2024 6:17 PM EDT  Workstation ID: [id number]       Estimated Creatinine Clearance: 54.2 mL/min (by C-G formula based on SCr of 0.84 mg/dL).    Assessment & Plan   Assessment/Plan       Active Hospital Problems    Diagnosis  POA    **Shortness of breath [R06.02]  Yes      Resolved Hospital Problems   No resolved problems to display.       Dyspnea/parainfluenza +  Lab Results   Component Value Date    WBC 4.19 05/03/2024    EOSABS 0.13 05/02/2024     - bnp 119  - procal .05  -Chest x-ray:reviewed and showing mild vascular congestion  -Respiratory panel is + for parainfluenza   -EKG:sinus rate 80  -trop 20, 13  - duonebs, steroids, azithromycin  -Telemetry and pulse oximetry    Hypertension  -well Controlled   BP Readings from Last 1 Encounters:   05/03/24 129/64     - Continue metoprolol  - Monitor while admitted     CAD  - cont asa, imdur    Hypothyroidism  - cont synthroid    HPL  - cont statin      VTE Prophylaxis -   Mechanical Order History:        Ordered        05/03/24 0936  Place Sequential Compression Device  Once            05/03/24 0936  Maintain Sequential Compression Device  Continuous                          Pharmalogical Order History:       None            CODE STATUS:    Code Status and Medical Interventions:   Ordered at: 05/03/24 0936     Code Status (Patient has no pulse and is not breathing):    CPR (Attempt to Resuscitate)     Medical  Interventions (Patient has pulse or is breathing):    Full Support       This patient has been examined wearing personal protective equipment.     I discussed the patient's findings and my recommendations with patient and nursing staff.      Signature:Electronically signed by Sherrie Murrieta PA-C, 05/03/24, 4:08 PM EDT.

## 2024-05-03 NOTE — ED NOTES
Nursing report ED to floor  Ally Ivory  78 y.o.  female    HPI:   Chief Complaint   Patient presents with    Shortness of Breath       Admitting doctor:   No admitting provider for patient encounter.    Admitting diagnosis:   The primary encounter diagnosis was Parainfluenza infection. Diagnoses of Shortness of breath and Wheezing were also pertinent to this visit.    Code status:   Current Code Status       Date Active Code Status Order ID Comments User Context       Prior            Allergies:   Pravastatin, Vancomycin, and Rosuvastatin calcium    Isolation:  No active isolations     Fall Risk:  Fall Risk Assessment was completed, and patient is at low risk for falls.   Predictive Model Details         15 (Low) Factor Value    Calculated 5/2/2024 22:22 Age 78    Risk of Fall Model Active Peripheral IV Present     Imaging order in this encounter Present     Respiratory Rate 22     Magnesium not on file     Number of Distinct Medication Classes administered 4     Diastolic BP 67     Mono Scale not on file     Creatinine 1.2 mg/dL     Chloride 100 mmol/L     Total Bilirubin 0.8 mg/dL     Days after Admission 0.213     Albumin 4.5 g/dL     Calcium 9.7 mg/dL     Potassium 3.4 mmol/L     ALT 13 U/L         Weight:       05/02/24  1713   Weight: 78.9 kg (174 lb)       Intake and Output  No intake or output data in the 24 hours ending 05/02/24 2222    Diet:   Dietary Orders (From admission, onward)       Start     Ordered    05/02/24 1958  Diet: Regular/House; Fluid Consistency: Thin (IDDSI 0)  Diet Effective Now        References:    Diet Order Crosswalk   Question Answer Comment   Diets: Regular/House    Fluid Consistency: Thin (IDDSI 0)        05/02/24 1959                     Most recent vitals:   Vitals:    05/02/24 1835 05/02/24 1901 05/02/24 2001 05/02/24 2101   BP:  123/64 125/63 104/67   BP Location:   Right arm Right arm   Patient Position:  Sitting Sitting Sitting   Pulse: 74 81 81 77   Resp: 18 18 20 22    Temp:       TempSrc:       SpO2: 98% 96% 97% 94%   Weight:       Height:           Active LDAs/IV Access:   Lines, Drains & Airways       Active LDAs       Name Placement date Placement time Site Days    Peripheral IV Right Antecubital --  --  Antecubital  --    Peripheral IV 05/02/24 1816 Anterior;Left Wrist 05/02/24 1816  Wrist  less than 1                    Skin Condition:   Skin Assessments (last day)       None             Labs (abnormal labs have a star):   Labs Reviewed   RESPIRATORY PANEL PCR W/ COVID-19 (SARS-COV-2), NP SWAB IN UTM/VTP, 2 HR TAT - Abnormal; Notable for the following components:       Result Value    Parainfluenza Virus 3 Detected (*)     All other components within normal limits    Narrative:     In the setting of a positive respiratory panel with a viral infection PLUS a negative procalcitonin without other underlying concern for bacterial infection, consider observing off antibiotics or discontinuation of antibiotics and continue supportive care. If the respiratory panel is positive for atypical bacterial infection (Bordetella pertussis, Chlamydophila pneumoniae, or Mycoplasma pneumoniae), consider antibiotic de-escalation to target atypical bacterial infection.   COMPREHENSIVE METABOLIC PANEL - Abnormal; Notable for the following components:    Creatinine 1.20 (*)     Potassium 3.4 (*)     eGFR 46.1 (*)     All other components within normal limits    Narrative:     GFR Normal >60  Chronic Kidney Disease <60  Kidney Failure <15    The GFR formula is only valid for adults with stable renal function between ages 18 and 70.   SINGLE HS TROPONIN T - Abnormal; Notable for the following components:    HS Troponin T 20 (*)     All other components within normal limits    Narrative:     High Sensitive Troponin T Reference Range:  <14.0 ng/L- Negative Female for AMI  <22.0 ng/L- Negative Male for AMI  >=14 - Abnormal Female indicating possible myocardial injury.  >=22 - Abnormal Male  indicating possible myocardial injury.   Clinicians would have to utilize clinical acumen, EKG, Troponin, and serial changes to determine if it is an Acute Myocardial Infarction or myocardial injury due to an underlying chronic condition.        CBC WITH AUTO DIFFERENTIAL - Abnormal; Notable for the following components:    Platelets 105 (*)     All other components within normal limits   MANUAL DIFFERENTIAL - Abnormal; Notable for the following components:    Lymphocyte % 15.0 (*)     All other components within normal limits   BNP (IN-HOUSE) - Normal    Narrative:     This assay is used as an aid in the diagnosis of individuals suspected of having heart failure. It can be used as an aid in the diagnosis of acute decompensated heart failure (ADHF) in patients presenting with signs and symptoms of ADHF to the emergency department (ED). In addition, NT-proBNP of <300 pg/mL indicates ADHF is not likely.    Age Range Result Interpretation  NT-proBNP Concentration (pg/mL:      <50             Positive            >450                   Gray                 300-450                    Negative             <300    50-75           Positive            >900                  Gray                300-900                  Negative            <300      >75             Positive            >1800                  Gray                300-1800                  Negative            <300   PROCALCITONIN - Normal    Narrative:     As a Marker for Sepsis (Non-Neonates):    1. <0.5 ng/mL represents a low risk of severe sepsis and/or septic shock.  2. >2 ng/mL represents a high risk of severe sepsis and/or septic shock.    As a Marker for Lower Respiratory Tract Infections that require antibiotic therapy:    PCT on Admission    Antibiotic Therapy       6-12 Hrs later    >0.5                Strongly Recommended  >0.25 - <0.5        Recommended   0.1 - 0.25          Discouraged              Remeasure/reassess PCT  <0.1                Strongly  "Discouraged     Remeasure/reassess PCT    As 28 day mortality risk marker: \"Change in Procalcitonin Result\" (>80% or <=80%) if Day 0 (or Day 1) and Day 4 values are available. Refer to [email or web address]-pct-[email or web address]    Change in PCT <=80%  A decrease of PCT levels below or equal to 80% defines a positive change in PCT test result representing a higher risk for 28-day all-cause mortality of patients diagnosed with severe sepsis for septic shock.    Change in PCT >80%  A decrease of PCT levels of more than 80% defines a negative change in PCT result representing a lower risk for 28-day all-cause mortality of patients diagnosed with severe sepsis or septic shock.      SCAN SLIDE   CBC (NO DIFF)   BASIC METABOLIC PANEL   CBC AND DIFFERENTIAL    Narrative:     The following orders were created for panel order CBC & Differential.  Procedure                               Abnormality         Status                     ---------                               -----------         ------                     CBC Auto Differential[[id number]]        Abnormal            Final result               Scan Slide[[id number]]                                       Final result                 Please view results for these tests on the individual orders.       LOC: Person, Place, Time, and Situation    Telemetry:  Observation Unit    Cardiac Monitoring Ordered: yes    EKG:   ECG 12 Lead Dyspnea   Preliminary Result   HEART RATE= 80  bpm   RR Interval= 756  ms   MT Interval= 167  ms   P Horizontal Axis= 25  deg   P Front Axis= 47  deg   QRSD Interval= 104  ms   QT Interval= 435  ms   QTcB= 500  ms   QRS Axis= -9  deg   T Wave Axis= 56  deg   - ABNORMAL ECG -   Sinus rhythm   Anteroseptal infarct, age indeterminate   Electronically Signed By:    Date and Time of Study: 2024-05-02 17:21:48          Medications Given in the ED:   Medications   sodium chloride 0.9 % flush 10 mL (has no administration in time range)   sodium chloride 0.9 % " flush 10 mL (has no administration in time range)   sodium chloride 0.9 % flush 10 mL (has no administration in time range)   sodium chloride 0.9 % infusion 40 mL (has no administration in time range)   sennosides-docusate (PERICOLACE) 8.6-50 MG per tablet 2 tablet (has no administration in time range)     And   polyethylene glycol (MIRALAX) packet 17 g (has no administration in time range)     And   bisacodyl (DULCOLAX) EC tablet 5 mg (has no administration in time range)     And   bisacodyl (DULCOLAX) suppository 10 mg (has no administration in time range)   ipratropium-albuterol (DUO-NEB) nebulizer solution 3 mL (3 mL Nebulization Not Given 5/2/24 2002)   aspirin EC tablet 81 mg (has no administration in time range)   buPROPion XL (WELLBUTRIN XL) 24 hr tablet 300 mg (has no administration in time range)   isosorbide mononitrate (IMDUR) 24 hr tablet 30 mg (has no administration in time range)   levothyroxine (SYNTHROID, LEVOTHROID) tablet 150 mcg (has no administration in time range)   metoprolol succinate XL (TOPROL-XL) 24 hr tablet 50 mg (has no administration in time range)   mycophenolate (CELLCEPT) capsule 250 mg (has no administration in time range)   nitroglycerin (NITROSTAT) SL tablet 0.4 mg (has no administration in time range)   PARoxetine (PAXIL) tablet 30 mg (has no administration in time range)   rosuvastatin (CRESTOR) tablet 10 mg (has no administration in time range)   ipratropium-albuterol (DUO-NEB) nebulizer solution 3 mL (3 mL Nebulization Given 5/2/24 1832)   methylPREDNISolone sodium succinate (SOLU-Medrol) injection 125 mg (125 mg Intravenous Given 5/2/24 2011)   azithromycin (ZITHROMAX) 500 mg in sodium chloride 0.9 % 250 mL IVPB-VTB (500 mg Intravenous New Bag 5/2/24 2011)       Imaging results:  XR Chest 1 View    Result Date: 5/2/2024  Mild vascular congestion. Electronically Signed: Jerad Meza MD  5/2/2024 6:17 PM EDT  Workstation ID: [id number]     Social issues:   Social History      Socioeconomic History    Marital status:    Tobacco Use    Smoking status: Never     Passive exposure: Past    Smokeless tobacco: Never   Vaping Use    Vaping status: Never Used   Substance and Sexual Activity    Alcohol use: Not Currently    Drug use: Never    Sexual activity: Defer       NIH Stroke Scale:  Interval: (not recorded)  1a. Level of Consciousness: (not recorded)  1b. LOC Questions: (not recorded)  1c. LOC Commands: (not recorded)  2. Best Gaze: (not recorded)  3. Visual: (not recorded)  4. Facial Palsy: (not recorded)  5a. Motor Arm, Left: (not recorded)  5b. Motor Arm, Right: (not recorded)  6a. Motor Leg, Left: (not recorded)  6b. Motor Leg, Right: (not recorded)  7. Limb Ataxia: (not recorded)  8. Sensory: (not recorded)  9. Best Language: (not recorded)  10. Dysarthria: (not recorded)  11. Extinction and Inattention (formerly Neglect): (not recorded)    Total (NIH Stroke Scale): (not recorded)     Additional notable assessment information:       Nursing report ED to floor:    OBS nurse Shanika Celestin LPN   05/02/24 22:22 EDT Nursing report ED to floor  Ally Ivory  78 y.o.  female    HPI:   Chief Complaint   Patient presents with    Shortness of Breath       Admitting doctor:   No admitting provider for patient encounter.    Admitting diagnosis:   The primary encounter diagnosis was Parainfluenza infection. Diagnoses of Shortness of breath and Wheezing were also pertinent to this visit.    Code status:   Current Code Status       Date Active Code Status Order ID Comments User Context       Prior            Allergies:   Pravastatin, Vancomycin, and Rosuvastatin calcium    Isolation:  No active isolations     Fall Risk:  Fall Risk Assessment was completed, and patient is at low risk for falls.   Predictive Model Details         15 (Low) Factor Value    Calculated 5/2/2024 22:22 Age 78    Risk of Fall Model Active Peripheral IV Present     Imaging order in this encounter  Present     Respiratory Rate 22     Magnesium not on file     Number of Distinct Medication Classes administered 4     Diastolic BP 67     Mono Scale not on file     Creatinine 1.2 mg/dL     Chloride 100 mmol/L     Total Bilirubin 0.8 mg/dL     Days after Admission 0.213     Albumin 4.5 g/dL     Calcium 9.7 mg/dL     Potassium 3.4 mmol/L     ALT 13 U/L         Weight:       05/02/24  1713   Weight: 78.9 kg (174 lb)       Intake and Output  No intake or output data in the 24 hours ending 05/02/24 2222    Diet:   Dietary Orders (From admission, onward)       Start     Ordered    05/02/24 1958  Diet: Regular/House; Fluid Consistency: Thin (IDDSI 0)  Diet Effective Now        References:    Diet Order Crosswalk   Question Answer Comment   Diets: Regular/House    Fluid Consistency: Thin (IDDSI 0)        05/02/24 1959                     Most recent vitals:   Vitals:    05/02/24 1835 05/02/24 1901 05/02/24 2001 05/02/24 2101   BP:  123/64 125/63 104/67   BP Location:   Right arm Right arm   Patient Position:  Sitting Sitting Sitting   Pulse: 74 81 81 77   Resp: 18 18 20 22   Temp:       TempSrc:       SpO2: 98% 96% 97% 94%   Weight:       Height:           Active LDAs/IV Access:   Lines, Drains & Airways       Active LDAs       Name Placement date Placement time Site Days    Peripheral IV Right Antecubital --  --  Antecubital  --    Peripheral IV 05/02/24 1816 Anterior;Left Wrist 05/02/24  1816  Wrist  less than 1                    Skin Condition:   Skin Assessments (last day)       None             Labs (abnormal labs have a star):   Labs Reviewed   RESPIRATORY PANEL PCR W/ COVID-19 (SARS-COV-2), NP SWAB IN UTM/VTP, 2 HR TAT - Abnormal; Notable for the following components:       Result Value    Parainfluenza Virus 3 Detected (*)     All other components within normal limits    Narrative:     In the setting of a positive respiratory panel with a viral infection PLUS a negative procalcitonin without other underlying  concern for bacterial infection, consider observing off antibiotics or discontinuation of antibiotics and continue supportive care. If the respiratory panel is positive for atypical bacterial infection (Bordetella pertussis, Chlamydophila pneumoniae, or Mycoplasma pneumoniae), consider antibiotic de-escalation to target atypical bacterial infection.   COMPREHENSIVE METABOLIC PANEL - Abnormal; Notable for the following components:    Creatinine 1.20 (*)     Potassium 3.4 (*)     eGFR 46.1 (*)     All other components within normal limits    Narrative:     GFR Normal >60  Chronic Kidney Disease <60  Kidney Failure <15    The GFR formula is only valid for adults with stable renal function between ages 18 and 70.   SINGLE HS TROPONIN T - Abnormal; Notable for the following components:    HS Troponin T 20 (*)     All other components within normal limits    Narrative:     High Sensitive Troponin T Reference Range:  <14.0 ng/L- Negative Female for AMI  <22.0 ng/L- Negative Male for AMI  >=14 - Abnormal Female indicating possible myocardial injury.  >=22 - Abnormal Male indicating possible myocardial injury.   Clinicians would have to utilize clinical acumen, EKG, Troponin, and serial changes to determine if it is an Acute Myocardial Infarction or myocardial injury due to an underlying chronic condition.        CBC WITH AUTO DIFFERENTIAL - Abnormal; Notable for the following components:    Platelets 105 (*)     All other components within normal limits   MANUAL DIFFERENTIAL - Abnormal; Notable for the following components:    Lymphocyte % 15.0 (*)     All other components within normal limits   BNP (IN-HOUSE) - Normal    Narrative:     This assay is used as an aid in the diagnosis of individuals suspected of having heart failure. It can be used as an aid in the diagnosis of acute decompensated heart failure (ADHF) in patients presenting with signs and symptoms of ADHF to the emergency department (ED). In addition,  "NT-proBNP of <300 pg/mL indicates ADHF is not likely.    Age Range Result Interpretation  NT-proBNP Concentration (pg/mL:      <50             Positive            >450                   Gray                 300-450                    Negative             <300    50-75           Positive            >900                  Gray                300-900                  Negative            <300      >75             Positive            >1800                  Gray                300-1800                  Negative            <300   PROCALCITONIN - Normal    Narrative:     As a Marker for Sepsis (Non-Neonates):    1. <0.5 ng/mL represents a low risk of severe sepsis and/or septic shock.  2. >2 ng/mL represents a high risk of severe sepsis and/or septic shock.    As a Marker for Lower Respiratory Tract Infections that require antibiotic therapy:    PCT on Admission    Antibiotic Therapy       6-12 Hrs later    >0.5                Strongly Recommended  >0.25 - <0.5        Recommended   0.1 - 0.25          Discouraged              Remeasure/reassess PCT  <0.1                Strongly Discouraged     Remeasure/reassess PCT    As 28 day mortality risk marker: \"Change in Procalcitonin Result\" (>80% or <=80%) if Day 0 (or Day 1) and Day 4 values are available. Refer to [email or web address]://[email or web address]-[email or web address]    Change in PCT <=80%  A decrease of PCT levels below or equal to 80% defines a positive change in PCT test result representing a higher risk for 28-day all-cause mortality of patients diagnosed with severe sepsis for septic shock.    Change in PCT >80%  A decrease of PCT levels of more than 80% defines a negative change in PCT result representing a lower risk for 28-day all-cause mortality of patients diagnosed with severe sepsis or septic shock.      SCAN SLIDE   CBC (NO DIFF)   BASIC METABOLIC PANEL   CBC AND DIFFERENTIAL    Narrative:     The following orders were created for panel order CBC & Differential.  Procedure            "                    Abnormality         Status                     ---------                               -----------         ------                     CBC Auto Differential[[id number]]        Abnormal            Final result               Scan Slide[[id number]]                                       Final result                 Please view results for these tests on the individual orders.       LOC: Person, Place, Time, and Situation    Telemetry:  Observation Unit    Cardiac Monitoring Ordered: yes    EKG:   ECG 12 Lead Dyspnea   Preliminary Result   HEART RATE= 80  bpm   RR Interval= 756  ms   [place] Interval= 167  ms   P Horizontal Axis= 25  deg   P Front Axis= 47  deg   QRSD Interval= 104  ms   QT Interval= 435  ms   QTcB= 500  ms   QRS Axis= -9  deg   T Wave Axis= 56  deg   - ABNORMAL ECG -   Sinus rhythm   Anteroseptal infarct, age indeterminate   Electronically Signed By:    Date and Time of Study: 2024-05-02 17:21:48          Medications Given in the ED:   Medications   sodium chloride 0.9 % flush 10 mL (has no administration in time range)   sodium chloride 0.9 % flush 10 mL (has no administration in time range)   sodium chloride 0.9 % flush 10 mL (has no administration in time range)   sodium chloride 0.9 % infusion 40 mL (has no administration in time range)   sennosides-docusate (PERICOLACE) 8.6-50 MG per tablet 2 tablet (has no administration in time range)     And   polyethylene glycol (MIRALAX) packet 17 g (has no administration in time range)     And   bisacodyl (DULCOLAX) EC tablet 5 mg (has no administration in time range)     And   bisacodyl (DULCOLAX) suppository 10 mg (has no administration in time range)   ipratropium-albuterol (DUO-NEB) nebulizer solution 3 mL (3 mL Nebulization Not Given 5/2/24 2002)   aspirin EC tablet 81 mg (has no administration in time range)   buPROPion XL (WELLBUTRIN XL) 24 hr tablet 300 mg (has no administration in time range)   isosorbide mononitrate (IMDUR) 24 hr tablet  30 mg (has no administration in time range)   levothyroxine (SYNTHROID, LEVOTHROID) tablet 150 mcg (has no administration in time range)   metoprolol succinate XL (TOPROL-XL) 24 hr tablet 50 mg (has no administration in time range)   mycophenolate (CELLCEPT) capsule 250 mg (has no administration in time range)   nitroglycerin (NITROSTAT) SL tablet 0.4 mg (has no administration in time range)   PARoxetine (PAXIL) tablet 30 mg (has no administration in time range)   rosuvastatin (CRESTOR) tablet 10 mg (has no administration in time range)   ipratropium-albuterol (DUO-NEB) nebulizer solution 3 mL (3 mL Nebulization Given 5/2/24 1832)   methylPREDNISolone sodium succinate (SOLU-Medrol) injection 125 mg (125 mg Intravenous Given 5/2/24 2011)   azithromycin (ZITHROMAX) 500 mg in sodium chloride 0.9 % 250 mL IVPB-VTB (500 mg Intravenous New Bag 5/2/24 2011)       Imaging results:  XR Chest 1 View    Result Date: 5/2/2024  Mild vascular congestion. Electronically Signed: Jerad Meza MD  5/2/2024 6:17 PM EDT  Workstation ID: [id number]     Social issues:   Social History     Socioeconomic History    Marital status:    Tobacco Use    Smoking status: Never     Passive exposure: Past    Smokeless tobacco: Never   Vaping Use    Vaping status: Never Used   Substance and Sexual Activity    Alcohol use: Not Currently    Drug use: Never    Sexual activity: Defer       NIH Stroke Scale:  Interval: (not recorded)  1a. Level of Consciousness: (not recorded)  1b. LOC Questions: (not recorded)  1c. LOC Commands: (not recorded)  2. Best Gaze: (not recorded)  3. Visual: (not recorded)  4. Facial Palsy: (not recorded)  5a. Motor Arm, Left: (not recorded)  5b. Motor Arm, Right: (not recorded)  6a. Motor Leg, Left: (not recorded)  6b. Motor Leg, Right: (not recorded)  7. Limb Ataxia: (not recorded)  8. Sensory: (not recorded)  9. Best Language: (not recorded)  10. Dysarthria: (not recorded)  11. Extinction and Inattention (formerly  Neglect): (not recorded)    Total (NIH Stroke Scale): (not recorded)     Additional notable assessment information:       Nursing report ED to floor:    OBS nurseShanika.     Will Celestin LPN   05/02/24 22:22 EDT

## 2024-05-03 NOTE — CASE MANAGEMENT/SOCIAL WORK
Discharge Planning Assessment   [facility]     Patient Name: Ally Ivory  MRN: [id number]  Today's Date: 5/3/2024    Admit Date: 5/2/2024    Plan: Return home with spouse. Has oxygen with portable concentrator. Son to transport   Discharge Needs Assessment       Row Name 05/03/24 1359       Living Environment    People in Home spouse    Name(s) of People in Home Emerson    Current Living Arrangements home    Potentially Unsafe Housing Conditions none    In the past 12 months has the electric, gas, oil, or water company threatened to shut off services in your home? No    Primary Care Provided by self    Provides Primary Care For no one    Family Caregiver if Needed spouse    Quality of Family Relationships helpful;involved;supportive    Able to Return to Prior Arrangements yes       Resource/Environmental Concerns    Resource/Environmental Concerns none    Transportation Concerns none       Transportation Needs    In the past 12 months, has lack of transportation kept you from medical appointments or from getting medications? no    In the past 12 months, has lack of transportation kept you from meetings, work, or from getting things needed for daily living? No       Food Insecurity    Within the past 12 months, you worried that your food would run out before you got the money to buy more. Never true    Within the past 12 months, the food you bought just didn't last and you didn't have money to get more. Never true       Transition Planning    Patient/Family Anticipates Transition to home with family    Patient/Family Anticipated Services at Transition none    Transportation Anticipated car, drives self;family or friend will provide       Discharge Needs Assessment    Readmission Within the Last 30 Days no previous admission in last 30 days    Equipment Currently Used at Home nebulizer;oxygen    Concerns to be Addressed discharge planning    Anticipated Changes Related to Illness none    Equipment Needed After  Discharge none                   Discharge Plan       Row Name 05/03/24 1400       Plan    Plan Return home with spouse. Has oxygen with portable concentrator. Son to transport    Plan Comments CM met with Mrs. Ivory who stated she resides with her spouse, Emerson who recently was released from the hospital. She drives and is independent and helps her  . She has oxygen and a portable concentrator and nebulizer.  She has had [facility] in the past but does not feel she will need at this time. Her son will transport at discharge. She denied any financial concerns.                  Demographic Summary       Row Name 05/03/24 1350       General Information    Admission Type observation    Arrived From emergency department    Required Notices Provided Observation Status Notice    Referral Source admission list    Reason for Consult discharge planning    Preferred Language English       Contact Information    Permission Granted to Share Info With                    Functional Status       Row Name 05/03/24 5143       Functional Status    Usual Activity Tolerance good    Current Activity Tolerance moderate       Functional Status, IADL    Medications independent    Meal Preparation independent    Housekeeping independent    Laundry independent    Shopping independent       Mental Status    General Appearance WDL WDL       Mental Status Summary    Recent Changes in Mental Status/Cognitive Functioning no changes       Employment/    Employment Status retired                             Sarah CORDERO,RN Case Manager  [facility]  Phone: Desk- [phone] cell- [phone]

## 2024-05-04 ENCOUNTER — READMISSION MANAGEMENT (OUTPATIENT)
Dept: CALL CENTER | Facility: HOSPITAL | Age: 79
End: 2024-05-04
Payer: MEDICARE

## 2024-05-04 VITALS
OXYGEN SATURATION: 94 % | TEMPERATURE: 97.7 F | HEIGHT: 63 IN | BODY MASS INDEX: 31.01 KG/M2 | RESPIRATION RATE: 18 BRPM | WEIGHT: 175 LBS | HEART RATE: 72 BPM | DIASTOLIC BLOOD PRESSURE: 51 MMHG | SYSTOLIC BLOOD PRESSURE: 108 MMHG

## 2024-05-04 LAB
ANION GAP SERPL CALCULATED.3IONS-SCNC: 11 MMOL/L (ref 5–15)
BASOPHILS # BLD AUTO: 0.02 10*3/MM3 (ref 0–0.2)
BASOPHILS NFR BLD AUTO: 0.3 % (ref 0–1.5)
BUN SERPL-MCNC: 16 MG/DL (ref 8–23)
BUN/CREAT SERPL: 18 (ref 7–25)
CALCIUM SPEC-SCNC: 9.2 MG/DL (ref 8.6–10.5)
CHLORIDE SERPL-SCNC: 105 MMOL/L (ref 98–107)
CO2 SERPL-SCNC: 27 MMOL/L (ref 22–29)
CREAT SERPL-MCNC: 0.89 MG/DL (ref 0.57–1)
DEPRECATED RDW RBC AUTO: 48.2 FL (ref 37–54)
EGFRCR SERPLBLD CKD-EPI 2021: 66 ML/MIN/1.73
EOSINOPHIL # BLD AUTO: 0.12 10*3/MM3 (ref 0–0.4)
EOSINOPHIL NFR BLD AUTO: 1.9 % (ref 0.3–6.2)
ERYTHROCYTE [DISTWIDTH] IN BLOOD BY AUTOMATED COUNT: 13.9 % (ref 12.3–15.4)
GEN 5 2HR TROPONIN T REFLEX: 22 NG/L
GLUCOSE SERPL-MCNC: 82 MG/DL (ref 65–99)
HCT VFR BLD AUTO: 41.3 % (ref 34–46.6)
HGB BLD-MCNC: 13 G/DL (ref 12–15.9)
IMM GRANULOCYTES # BLD AUTO: 0.02 10*3/MM3 (ref 0–0.05)
IMM GRANULOCYTES NFR BLD AUTO: 0.3 % (ref 0–0.5)
LYMPHOCYTES # BLD AUTO: 1.21 10*3/MM3 (ref 0.7–3.1)
LYMPHOCYTES NFR BLD AUTO: 19.3 % (ref 19.6–45.3)
MCH RBC QN AUTO: 29.6 PG (ref 26.6–33)
MCHC RBC AUTO-ENTMCNC: 31.5 G/DL (ref 31.5–35.7)
MCV RBC AUTO: 94.1 FL (ref 79–97)
MONOCYTES # BLD AUTO: 0.37 10*3/MM3 (ref 0.1–0.9)
MONOCYTES NFR BLD AUTO: 5.9 % (ref 5–12)
NEUTROPHILS NFR BLD AUTO: 4.54 10*3/MM3 (ref 1.7–7)
NEUTROPHILS NFR BLD AUTO: 72.3 % (ref 42.7–76)
NRBC BLD AUTO-RTO: 0 /100 WBC (ref 0–0.2)
PLATELET # BLD AUTO: 115 10*3/MM3 (ref 140–450)
PMV BLD AUTO: 11.9 FL (ref 6–12)
POTASSIUM SERPL-SCNC: 3.9 MMOL/L (ref 3.5–5.2)
RBC # BLD AUTO: 4.39 10*6/MM3 (ref 3.77–5.28)
SODIUM SERPL-SCNC: 143 MMOL/L (ref 136–145)
TROPONIN T DELTA: 9 NG/L
WBC NRBC COR # BLD AUTO: 6.28 10*3/MM3 (ref 3.4–10.8)

## 2024-05-04 PROCEDURE — 94799 UNLISTED PULMONARY SVC/PX: CPT

## 2024-05-04 PROCEDURE — G0378 HOSPITAL OBSERVATION PER HR: HCPCS

## 2024-05-04 PROCEDURE — 80048 BASIC METABOLIC PNL TOTAL CA: CPT | Performed by: PHYSICIAN ASSISTANT

## 2024-05-04 PROCEDURE — 84484 ASSAY OF TROPONIN QUANT: CPT | Performed by: PHYSICIAN ASSISTANT

## 2024-05-04 PROCEDURE — 85025 COMPLETE CBC W/AUTO DIFF WBC: CPT | Performed by: PHYSICIAN ASSISTANT

## 2024-05-04 RX ORDER — AZITHROMYCIN 250 MG/1
TABLET, FILM COATED ORAL
Qty: 2 TABLET | Refills: 0 | Status: SHIPPED | OUTPATIENT
Start: 2024-05-05

## 2024-05-04 RX ORDER — PREDNISONE 10 MG/1
10 TABLET ORAL DAILY
Qty: 21 TABLET | Refills: 0 | Status: SHIPPED | OUTPATIENT
Start: 2024-05-04

## 2024-05-04 RX ADMIN — IPRATROPIUM BROMIDE AND ALBUTEROL SULFATE 3 ML: .5; 3 SOLUTION RESPIRATORY (INHALATION) at 11:59

## 2024-05-04 RX ADMIN — Medication 10 ML: at 08:48

## 2024-05-04 RX ADMIN — LEVOTHYROXINE SODIUM 150 MCG: 0.15 TABLET ORAL at 05:59

## 2024-05-04 RX ADMIN — ASPIRIN 81 MG: 81 TABLET, COATED ORAL at 08:48

## 2024-05-04 RX ADMIN — BUPROPION HYDROCHLORIDE 300 MG: 150 TABLET, EXTENDED RELEASE ORAL at 08:48

## 2024-05-04 RX ADMIN — PAROXETINE 30 MG: 10 TABLET, FILM COATED ORAL at 08:48

## 2024-05-04 RX ADMIN — ROSUVASTATIN 10 MG: 10 TABLET, FILM COATED ORAL at 08:47

## 2024-05-04 RX ADMIN — AZITHROMYCIN DIHYDRATE 250 MG: 250 TABLET ORAL at 08:47

## 2024-05-04 NOTE — PLAN OF CARE
Goal Outcome Evaluation:  Plan of Care Reviewed With: patient        Progress: improving  Outcome Evaluation: Pt rested throughout the shift, had intermittent loose cough. pt is on 3LO2 prn. Ambulated. Will cont to monitor.

## 2024-05-04 NOTE — OUTREACH NOTE
Prep Survey      Flowsheet Row Responses   [facility] discharged from? [facility]   Is LACE score < 7 ? No   Eligibility Readm Mgmt   Discharge diagnosis Shortness of breath   Does the patient have one of the following disease processes/diagnoses(primary or secondary)? Other   Does the patient have Home health ordered? No   Is there a DME ordered? No  [current with oxygen and concentrator.]   Medication alerts for this patient Zithromax, Prednisone   Prep survey completed? Yes            Emilie BOLAÑOS - Registered Nurse

## 2024-05-04 NOTE — CONSULTS
"Nutrition Services    Patient Name: Ally Ivory  YOB: 1945  MRN: [id number]  Admission date: 5/2/2024    Continue current diet and encourage good PO intake.     NUTRITION SCREENING      Trending Narrative: 5/4: Pt being assessed for malnutrition screening tool score of 4 and nursing admission screen consult. Pt admitted to [facility] with SOB. Pt also verbalized diarrhea but no vomiting. Possible discharge today. RD unable to visit pt in person this date.        PO Diet: Diet: Regular/House; Fluid Consistency: Thin (IDDSI 0)   PO Supplements: -   Trending PO Intake:  5/4: 100%       Nutritionally-Pertinent Medications RDN Reviewed, C/W clinical course         Labs (reviewed below):      Results from last 7 days   Lab Units 05/04/24  0702 05/03/24  0559 05/02/24  1818   SODIUM mmol/L 143 141 138   POTASSIUM mmol/L 3.9 4.6 3.4*   CHLORIDE mmol/L 105 105 100   CO2 mmol/L 27.0 27.0 25.0   BUN mg/dL 16 15 17   CREATININE mg/dL 0.89 0.84 1.20*   CALCIUM mg/dL 9.2 9.5 9.7   BILIRUBIN mg/dL  --   --  0.8   ALK PHOS U/L  --   --  82   ALT (SGPT) U/L  --   --  13   AST (SGOT) U/L  --   --  20   GLUCOSE mg/dL 82 156* 95     Results from last 7 days   Lab Units 05/04/24  0702   HEMOGLOBIN g/dL 13.0   HEMATOCRIT % 41.3     No results found for: \"HGBA1C\"       GI Function:  + BM on 5/2       Skin: Intact        Weight Review: Estimated body mass index is 31 kg/m² as calculated from the following:    Height as of this encounter: 160 cm (63\").    Weight as of this encounter: 79.4 kg (175 lb).    Comment:   ~7% wt loss x 1.5 months, wts recorded were not noted to be scale wts    Wt Readings from Last 30 Encounters:   05/02/24 2302 79.4 kg (175 lb)   05/02/24 1713 78.9 kg (174 lb)   03/20/24 1024 85.3 kg (188 lb)   03/06/24 1150 85.3 kg (188 lb)   02/01/24 1056 81.6 kg (180 lb)   09/05/23 1015 87.5 kg (193 lb)   05/01/23 1057 90.3 kg (199 lb)   04/12/23 0531 89.9 kg (198 lb 3.1 oz)   04/11/23 1158 91.1 kg (200 lb 13.4 " oz)   04/04/23 1000 86.2 kg (190 lb)   10/04/22 0902 91.6 kg (202 lb)   08/12/22 1056 94.8 kg (209 lb)   08/05/22 1258 92.6 kg (204 lb 2.3 oz)   08/05/22 0648 93.9 kg (207 lb)   08/02/22 1138 90.3 kg (199 lb)   06/21/22 1329 95.3 kg (210 lb)   03/15/22 1416 97.1 kg (214 lb)   03/15/22 0936 97.1 kg (214 lb)   12/07/21 1111 90.7 kg (200 lb)   10/04/21 0827 95.2 kg (209 lb 14.1 oz)   08/26/21 1358 96.2 kg (212 lb)   08/21/21 0519 96.6 kg (212 lb 15.4 oz)   08/20/21 0600 96.5 kg (212 lb 11.9 oz)   08/19/21 1700 97.9 kg (215 lb 13.3 oz)   08/19/21 0846 95 kg (209 lb 7 oz)   08/06/21 1147 95.3 kg (210 lb)   07/19/21 1036 94.9 kg (209 lb 3.5 oz)   07/06/21 1013 95.3 kg (210 lb)   12/15/20 0905 90.7 kg (200 lb)   12/07/20 1448 90.7 kg (200 lb)   12/01/20 1024 90.7 kg (200 lb)   07/06/20 1030 95.6 kg (210 lb 12.8 oz)   12/10/19 0855 96.6 kg (213 lb)   12/03/19 1140 94.3 kg (208 lb)   08/19/19 1028 94.7 kg (208 lb 11 oz)   05/07/19 1029 93 kg (205 lb)          --       Nutrition Problem Statement: No acute nutrition dx at this time; RD to follow up per protocol        Nutrition Intervention: Continue current diet and encourage good PO intake.          Monitoring/Evaluation Per protocol, PO intake, Pertinent labs, Weight, GI status          RD to follow up per protocol.    Electronically signed by:  Toya Daigle RD  05/04/24 08:43 EDT

## 2024-05-04 NOTE — DISCHARGE SUMMARY
[facility]    Gael Vasquez MD    CHIEF COMPLAINT:     Shortness of breath    HISTORY OF PRESENT ILLNESS:    HPI    ED  78-year-old female with a history of pulmonary fibrosis, interstitial lung disease, CAD, A-fib presents for feeling rundown, shortness of breath, productive cough.  Symptoms going on for couple weeks.  No known fevers.  Has had some diarrhea but no vomiting.        Observation 5/3/24  Pt concurs with er hpi. Pt receiving steroids and duonebs. Pt still feels soa. Will keep again overnight and intend to dc in am after a few more doses of iv steroids.     Past Medical History:   Diagnosis Date    Atrial fibrillation     Coronary artery disease     Dyslipidemia     Hypertension     Hypothyroidism     ILD (interstitial lung disease)     Lung fibrosis     Lupus     Lupus     Raynaud's disease      Past Surgical History:   Procedure Laterality Date    ATRIAL APPENDAGE EXCLUSION LEFT WITH TRANSESOPHAGEAL ECHOCARDIOGRAM N/A 8/19/2021    Procedure: Atrial Appendage Occlusion;  Surgeon: Eric Low MD;  Location: [facility] CATH INVASIVE LOCATION;  Service: Cardiovascular;  Laterality: N/A;    ATRIAL APPENDAGE EXCLUSION LEFT WITH TRANSESOPHAGEAL ECHOCARDIOGRAM N/A 8/19/2021    Procedure: Atrial Appendage Occlusion;  Surgeon: Francisco Garza MD;  Location: [facility] CATH INVASIVE LOCATION;  Service: Cardiovascular;  Laterality: N/A;    CARDIAC CATHETERIZATION      CARDIAC ELECTROPHYSIOLOGY PROCEDURE N/A 4/11/2023    Procedure: Pacemaker DC new  Abbott aware;  Surgeon: Eric Low MD;  Location: [facility] CATH INVASIVE LOCATION;  Service: Cardiovascular;  Laterality: N/A;    CATARACT EXTRACTION      CHOLECYSTECTOMY      ENDOSCOPY N/A 8/20/2021    Procedure: ESOPHAGOGASTRODUODENOSCOPY;  Surgeon: Joao Cross MD;  Location: [facility] ENDOSCOPY;  Service: Gastroenterology;  Laterality: N/A;  submucosal ecchymosis length of esophagus with vocal cord  trauma    HYSTERECTOMY      REPLACEMENT TOTAL KNEE BILATERAL       Family History   Problem Relation Age of Onset    No Known Problems Mother     Lung disease Father      Social History     Tobacco Use    Smoking status: Never     Passive exposure: Past    Smokeless tobacco: Never   Vaping Use    Vaping status: Never Used   Substance Use Topics    Alcohol use: Not Currently    Drug use: Never     Medications Prior to Admission   Medication Sig Dispense Refill Last Dose    aspirin 81 MG EC tablet Take 1 tablet by mouth Daily. 90 tablet 3 5/2/2024    buPROPion XL (WELLBUTRIN XL) 300 MG 24 hr tablet Take 1 tablet by mouth Daily.   5/2/2024    isosorbide mononitrate (IMDUR) 30 MG 24 hr tablet Take 1 tablet by mouth Every Morning. 90 tablet 3 5/2/2024    levothyroxine (SYNTHROID, LEVOTHROID) 150 MCG tablet Take 1 tablet by mouth Daily.   5/2/2024    metoprolol succinate XL (TOPROL-XL) 50 MG 24 hr tablet Take 1 tablet by mouth Daily. 90 tablet 3 5/2/2024    PARoxetine (PAXIL) 30 MG tablet Take 1 tablet by mouth Daily.   5/2/2024    rosuvastatin (CRESTOR) 10 MG tablet Take 1 tablet by mouth Daily.   5/1/2024    albuterol sulfate  (90 Base) MCG/ACT inhaler albuterol sulfate HFA 90 mcg/actuation aerosol inhaler   2 PUFFS EVERY 6 HOURS AS NEEDED FOR SHORTNESS OF BREATH/WHEEZING   Unknown    hydroxychloroquine (PLAQUENIL) 200 MG tablet Take 1 tablet by mouth Daily.       mycophenolate (CELLCEPT) 250 MG capsule mycophenolate mofetil 250 mg capsule   TAKE 1 CAPSULE BY MOUTH TWICE A DAY   Unknown    nitroglycerin (Nitrostat) 0.4 MG SL tablet Place 1 tablet under the tongue Every 5 (Five) Minutes As Needed for Chest Pain. Take no more than 3 doses in 15 minutes. 30 tablet 12 Unknown     Allergies:  Pravastatin, Vancomycin, and Rosuvastatin calcium    Immunization History   Administered Date(s) Administered    Flu Vaccine Quad PF >36MO 10/23/2017    Fluzone High Dose =>65 Years ([employer] ONLY) 11/13/2012, 10/15/2013,  09/09/2014, 10/09/2015, 10/14/2016, 10/23/2017, 09/25/2018    Fluzone Quad >6mos (Multi-dose) 10/23/2017, 09/09/2019    Pneumococcal Conjugate 13-Valent (PCV13) 10/22/2013    Pneumococcal Polysaccharide (PPSV23) 11/26/2019           REVIEW OF SYSTEMS:    ROS    Constitutional: Negative for fever.   Respiratory:  Positive for cough, shortness of breath and sputum production.    Gastrointestinal:  Positive for diarrhea and nausea. Negative for vomiting.     Vital Signs  Temp:  [97.1 °F (36.2 °C)-98 °F (36.7 °C)] 98 °F (36.7 °C)  Heart Rate:  [65-94] 66  Resp:  [15-18] 15  BP: (101-129)/(44-64) 103/44          Physical Exam:  Physical Exam  Constitutional:       Appearance: Normal appearance.   Cardiovascular:      Rate and Rhythm: Normal rate and regular rhythm.      Pulses: Normal pulses.      Heart sounds: Normal heart sounds.   Pulmonary:      Effort: Pulmonary effort is normal.      Comments: Diminished breath sounds  Neurological:      General: No focal deficit present.      Mental Status: She is alert and oriented to person, place, and time.   Psychiatric:         Mood and Affect: Mood normal.         Behavior: Behavior normal.     Emotional Behavior:    wnl   Debilities:   None    Results Review:    I reviewed the patient's new clinical results.  Lab Results (most recent)       Procedure Component Value Units Date/Time    High Sensitivity Troponin T 2Hr [[id number]]  (Abnormal) Collected: 05/04/24 0702    Specimen: Blood Updated: 05/04/24 0825     HS Troponin T 22 ng/L      Troponin T Delta 9 ng/L     Narrative:      High Sensitive Troponin T Reference Range:  <14.0 ng/L- Negative Female for AMI  <22.0 ng/L- Negative Male for AMI  >=14 - Abnormal Female indicating possible myocardial injury.  >=22 - Abnormal Male indicating possible myocardial injury.   Clinicians would have to utilize clinical acumen, EKG, Troponin, and serial changes to determine if it is an Acute Myocardial Infarction or myocardial injury due  to an underlying chronic condition.         Basic Metabolic Panel [[id number]]  (Normal) Collected: 05/04/24 0702    Specimen: Blood Updated: 05/04/24 0822     Glucose 82 mg/dL      BUN 16 mg/dL      Creatinine 0.89 mg/dL      Sodium 143 mmol/L      Potassium 3.9 mmol/L      Chloride 105 mmol/L      CO2 27.0 mmol/L      Calcium 9.2 mg/dL      BUN/Creatinine Ratio 18.0     Anion Gap 11.0 mmol/L      eGFR 66.0 mL/min/1.73     Narrative:      GFR Normal >60  Chronic Kidney Disease <60  Kidney Failure <15    The GFR formula is only valid for adults with stable renal function between ages 18 and 70.    CBC & Differential [[id number]]  (Abnormal) Collected: 05/04/24 0702    Specimen: Blood Updated: 05/04/24 0734    Narrative:      The following orders were created for panel order CBC & Differential.  Procedure                               Abnormality         Status                     ---------                               -----------         ------                     CBC Auto Differential[[id number]]        Abnormal            Final result                 Please view results for these tests on the individual orders.    CBC Auto Differential [[id number]]  (Abnormal) Collected: 05/04/24 0702    Specimen: Blood Updated: 05/04/24 0734     WBC 6.28 10*3/mm3      RBC 4.39 10*6/mm3      Hemoglobin 13.0 g/dL      Hematocrit 41.3 %      MCV 94.1 fL      MCH 29.6 pg      MCHC 31.5 g/dL      RDW 13.9 %      RDW-SD 48.2 fl      MPV 11.9 fL      Platelets 115 10*3/mm3      Neutrophil % 72.3 %      Lymphocyte % 19.3 %      Monocyte % 5.9 %      Eosinophil % 1.9 %      Basophil % 0.3 %      Immature Grans % 0.3 %      Neutrophils, Absolute 4.54 10*3/mm3      Lymphocytes, Absolute 1.21 10*3/mm3      Monocytes, Absolute 0.37 10*3/mm3      Eosinophils, Absolute 0.12 10*3/mm3      Basophils, Absolute 0.02 10*3/mm3      Immature Grans, Absolute 0.02 10*3/mm3      nRBC 0.0 /100 WBC     High Sensitivity Troponin T [[id number]]  (Normal)  Collected: 05/03/24 0559    Specimen: Blood from Arm, Right Updated: 05/03/24 1623     HS Troponin T 13 ng/L     Narrative:      High Sensitive Troponin T Reference Range:  <14.0 ng/L- Negative Female for AMI  <22.0 ng/L- Negative Male for AMI  >=14 - Abnormal Female indicating possible myocardial injury.  >=22 - Abnormal Male indicating possible myocardial injury.   Clinicians would have to utilize clinical acumen, EKG, Troponin, and serial changes to determine if it is an Acute Myocardial Infarction or myocardial injury due to an underlying chronic condition.         Basic Metabolic Panel [[id number]]  (Abnormal) Collected: 05/03/24 0559    Specimen: Blood from Arm, Right Updated: 05/03/24 0659     Glucose 156 mg/dL      BUN 15 mg/dL      Creatinine 0.84 mg/dL      Sodium 141 mmol/L      Potassium 4.6 mmol/L      Comment: Result checked          Chloride 105 mmol/L      CO2 27.0 mmol/L      Calcium 9.5 mg/dL      BUN/Creatinine Ratio 17.9     Anion Gap 9.0 mmol/L      eGFR 70.8 mL/min/1.73     Narrative:      GFR Normal >60  Chronic Kidney Disease <60  Kidney Failure <15    The GFR formula is only valid for adults with stable renal function between ages 18 and 70.    CBC (No Diff) [[id number]]  (Abnormal) Collected: 05/03/24 0559    Specimen: Blood from Arm, Right Updated: 05/03/24 0628     WBC 4.19 10*3/mm3      RBC 4.42 10*6/mm3      Hemoglobin 13.1 g/dL      Hematocrit 40.6 %      MCV 91.9 fL      MCH 29.6 pg      MCHC 32.3 g/dL      RDW 13.8 %      RDW-SD 47.1 fl      MPV 11.9 fL      Platelets 106 10*3/mm3     Respiratory Panel PCR w/COVID-19(SARS-CoV-2) ROBYN/GIO/AL/PAD/COR/JACQUELINE In-House, NP Swab in [facility], 2 HR TAT - Swab, Nasopharynx [[id number]]  (Abnormal) Collected: 05/02/24 1818    Specimen: Swab from Nasopharynx Updated: 05/02/24 1923     ADENOVIRUS, PCR Not Detected     Coronavirus 229E Not Detected     Coronavirus HKU1 Not Detected     Coronavirus NL63 Not Detected     Coronavirus OC43 Not Detected      "COVID19 Not Detected     Human Metapneumovirus Not Detected     Human Rhinovirus/Enterovirus Not Detected     Influenza A PCR Not Detected     Influenza B PCR Not Detected     Parainfluenza Virus 1 Not Detected     Parainfluenza Virus 2 Not Detected     Parainfluenza Virus 3 Detected     Parainfluenza Virus 4 Not Detected     RSV, PCR Not Detected     Bordetella pertussis pcr Not Detected     Bordetella parapertussis PCR Not Detected     Chlamydophila pneumoniae PCR Not Detected     Mycoplasma pneumo by PCR Not Detected    Narrative:      In the setting of a positive respiratory panel with a viral infection PLUS a negative procalcitonin without other underlying concern for bacterial infection, consider observing off antibiotics or discontinuation of antibiotics and continue supportive care. If the respiratory panel is positive for atypical bacterial infection (Bordetella pertussis, Chlamydophila pneumoniae, or Mycoplasma pneumoniae), consider antibiotic de-escalation to target atypical bacterial infection.    Procalcitonin [[id number]]  (Normal) Collected: 05/02/24 1818    Specimen: Blood Updated: 05/02/24 1854     Procalcitonin 0.05 ng/mL     Narrative:      As a Marker for Sepsis (Non-Neonates):    1. <0.5 ng/mL represents a low risk of severe sepsis and/or septic shock.  2. >2 ng/mL represents a high risk of severe sepsis and/or septic shock.    As a Marker for Lower Respiratory Tract Infections that require antibiotic therapy:    PCT on Admission    Antibiotic Therapy       6-12 Hrs later    >0.5                Strongly Recommended  >0.25 - <0.5        Recommended   0.1 - 0.25          Discouraged              Remeasure/reassess PCT  <0.1                Strongly Discouraged     Remeasure/reassess PCT    As 28 day mortality risk marker: \"Change in Procalcitonin Result\" (>80% or <=80%) if Day 0 (or Day 1) and Day 4 values are available. Refer to http://www.[employer]-pct-calculator.com    Change in PCT <=80%  A " decrease of PCT levels below or equal to 80% defines a positive change in PCT test result representing a higher risk for 28-day all-cause mortality of patients diagnosed with severe sepsis for septic shock.    Change in PCT >80%  A decrease of PCT levels of more than 80% defines a negative change in PCT result representing a lower risk for 28-day all-cause mortality of patients diagnosed with severe sepsis or septic shock.       Comprehensive Metabolic Panel [[id number]]  (Abnormal) Collected: 05/02/24 1818    Specimen: Blood Updated: 05/02/24 1848     Glucose 95 mg/dL      BUN 17 mg/dL      Creatinine 1.20 mg/dL      Sodium 138 mmol/L      Potassium 3.4 mmol/L      Chloride 100 mmol/L      CO2 25.0 mmol/L      Calcium 9.7 mg/dL      Total Protein 7.5 g/dL      Albumin 4.5 g/dL      ALT (SGPT) 13 U/L      AST (SGOT) 20 U/L      Alkaline Phosphatase 82 U/L      Total Bilirubin 0.8 mg/dL      Globulin 3.0 gm/dL      A/G Ratio 1.5 g/dL      BUN/Creatinine Ratio 14.2     Anion Gap 13.0 mmol/L      eGFR 46.1 mL/min/1.73     Narrative:      GFR Normal >60  Chronic Kidney Disease <60  Kidney Failure <15    The GFR formula is only valid for adults with stable renal function between ages 18 and 70.    BNP [[id number]]  (Normal) Collected: 05/02/24 1818    Specimen: Blood Updated: 05/02/24 1848     proBNP 119.3 pg/mL     Narrative:      This assay is used as an aid in the diagnosis of individuals suspected of having heart failure. It can be used as an aid in the diagnosis of acute decompensated heart failure (ADHF) in patients presenting with signs and symptoms of ADHF to the emergency department (ED). In addition, NT-proBNP of <300 pg/mL indicates ADHF is not likely.    Age Range Result Interpretation  NT-proBNP Concentration (pg/mL:      <50             Positive            >450                   Gray                 300-450                    Negative             <300    50-75           Positive            >900                   Brenner                300-900                  Negative            <300      >75             Positive            >1800                  Gray                300-1800                  Negative            <300    Single High Sensitivity Troponin T [[id number]]  (Abnormal) Collected: 05/02/24 1818    Specimen: Blood Updated: 05/02/24 1848     HS Troponin T 20 ng/L     Narrative:      High Sensitive Troponin T Reference Range:  <14.0 ng/L- Negative Female for AMI  <22.0 ng/L- Negative Male for AMI  >=14 - Abnormal Female indicating possible myocardial injury.  >=22 - Abnormal Male indicating possible myocardial injury.   Clinicians would have to utilize clinical acumen, EKG, Troponin, and serial changes to determine if it is an Acute Myocardial Infarction or myocardial injury due to an underlying chronic condition.         CBC & Differential [[id number]]  (Abnormal) Collected: 05/02/24 1818    Specimen: Blood Updated: 05/02/24 1847    Narrative:      The following orders were created for panel order CBC & Differential.  Procedure                               Abnormality         Status                     ---------                               -----------         ------                     CBC Auto Differential[[id number]]        Abnormal            Final result               Scan Slide[[id number]]                                       Final result                 Please view results for these tests on the individual orders.    CBC Auto Differential [[id number]]  (Abnormal) Collected: 05/02/24 1818    Specimen: Blood Updated: 05/02/24 1847     WBC 6.37 10*3/mm3      RBC 4.53 10*6/mm3      Hemoglobin 13.6 g/dL      Hematocrit 41.4 %      MCV 91.4 fL      MCH 30.0 pg      MCHC 32.9 g/dL      RDW 14.0 %      RDW-SD 47.7 fl      MPV 11.7 fL      Platelets 105 10*3/mm3     Scan Slide [[id number]] Collected: 05/02/24 1818    Specimen: Blood Updated: 05/02/24 1847     Scan Slide --     Comment: See Manual Differential Results        Manual Differential [[id number]]  (Abnormal) Collected: 05/02/24 1818    Specimen: Blood Updated: 05/02/24 1847     Neutrophil % 70.0 %      Lymphocyte % 15.0 %      Monocyte % 10.0 %      Eosinophil % 2.0 %      Atypical Lymphocyte % 3.0 %      Neutrophils Absolute 4.46 10*3/mm3      Lymphocytes Absolute 1.15 10*3/mm3      Monocytes Absolute 0.64 10*3/mm3      Eosinophils Absolute 0.13 10*3/mm3      RBC Morphology Normal     WBC Morphology Normal     Platelet Morphology Normal            Imaging Results (Most Recent)       Procedure Component Value Units Date/Time    XR Chest 1 View [[id number]] Collected: 05/02/24 1816     Updated: 05/02/24 1819    Narrative:      XR CHEST 1 VW    Date of Exam: 5/2/2024 6:07 PM EDT    Indication: cough, sob    Comparison: 1/12/2024    Findings: Dual-lead cardiac pacemaker. Mild vascular congestion. Right upper lobe scarring again noted. No consolidation. No pneumothorax or pleural effusion. The clavicles are intact. Degenerative changes of the acromioclavicular joints. The airway is   midline. Calcified left hilar lymph nodes consistent with prior granulomatous disease. The visualized upper abdomen is normal.      Impression:      Mild vascular congestion.      Electronically Signed: Jerad Meza MD    5/2/2024 6:17 PM EDT    Workstation ID: [id number]          reviewed    ECG/EMG Results (most recent)       Procedure Component Value Units Date/Time    ECG 12 Lead Dyspnea [[id number]] Collected: 05/02/24 1721     Updated: 05/03/24 0853     QT Interval 435 ms      QTC Interval 500 ms     Narrative:      HEART RATE= 80  bpm  RR Interval= 756  ms  PA Interval= 167  ms  P Horizontal Axis= 25  deg  P Front Axis= 47  deg  QRSD Interval= 104  ms  QT Interval= 435  ms  QTcB= 500  ms  QRS Axis= -9  deg  T Wave Axis= 56  deg  - ABNORMAL ECG -  Sinus rhythm  Anteroseptal infarct, age indeterminate  When compared with ECG of 12-Apr-2023 5:51:26,  Significant axis, voltage or hypertrophy  change  Electronically Signed By: Jose Cavazos (AL) 03-May-2024 08:53:29  Date and Time of Study: 2024-05-02 17:21:48    Telemetry Scan [[id number]] Resulted: 05/02/24     Updated: 05/03/24 1040          reviewed        Results for orders placed during the hospital encounter of 10/04/21    Adult Transesophageal Echo (TON) W/ Cont if Necessary Per Protocol    Interpretation Summary  TON   procedure note    Procedure:  TON    Indication:   A. fib, watchman    Date of procedure  : 10/4/2021    :  Dr. Eric Low    Description of procedure:    Under conscious sedation given by anesthesiology and local anesthesia, a TON probe was advanced into the esophagus and into the stomach 2D, M-mode, color Doppler images were obtained..  Patient tolerated procedure well complications well.    Complications: none    Results:    Normal LV size and contractility LV contractility, EF of 60%  Normal RV size  Biatrial enlargement seen.  Left atrial appendage has watchman left atrial appendage occluder device seated well.  Aortic valve, mitral valve, tricuspid valve appears structurally normal, moderate aortic, mitral, tricuspid seen.  Elevated RV systolic pressures at 58 mmHg consistent with pulmonary hypertension seen  No vegetation seen  No pericardial effusion seen.  Proximal aorta appears normal in size.  Mild aortic plaque seen      Recommendations/plan:    Clinical correlation recommended      Microbiology Results (last 10 days)       Procedure Component Value - Date/Time    Respiratory Panel PCR w/COVID-19(SARS-CoV-2) ROBYN/GIO/AL/PAD/COR/JACQUELINE In-House, NP Swab in UTM/VTM, 2 HR TAT - Swab, Nasopharynx [[id number]]  (Abnormal) Collected: 05/02/24 1818    Lab Status: Final result Specimen: Swab from Nasopharynx Updated: 05/02/24 1923     ADENOVIRUS, PCR Not Detected     Coronavirus 229E Not Detected     Coronavirus HKU1 Not Detected     Coronavirus NL63 Not Detected     Coronavirus OC43 Not Detected     COVID19 Not  Detected     Human Metapneumovirus Not Detected     Human Rhinovirus/Enterovirus Not Detected     Influenza A PCR Not Detected     Influenza B PCR Not Detected     Parainfluenza Virus 1 Not Detected     Parainfluenza Virus 2 Not Detected     Parainfluenza Virus 3 Detected     Parainfluenza Virus 4 Not Detected     RSV, PCR Not Detected     Bordetella pertussis pcr Not Detected     Bordetella parapertussis PCR Not Detected     Chlamydophila pneumoniae PCR Not Detected     Mycoplasma pneumo by PCR Not Detected    Narrative:      In the setting of a positive respiratory panel with a viral infection PLUS a negative procalcitonin without other underlying concern for bacterial infection, consider observing off antibiotics or discontinuation of antibiotics and continue supportive care. If the respiratory panel is positive for atypical bacterial infection (Bordetella pertussis, Chlamydophila pneumoniae, or Mycoplasma pneumoniae), consider antibiotic de-escalation to target atypical bacterial infection.            Assessment & Plan     Shortness of breath     Dyspnea/parainfluenza +        Lab Results   Component Value Date     WBC 4.19 05/03/2024     EOSABS 0.13 05/02/2024      - pt uses home 02, currently on 2-3 lpm nc  - bnp 119  - procal .05  -Chest x-ray:reviewed and showing mild vascular congestion  -Respiratory panel is + for parainfluenza   -EKG:sinus rate 80  -trop 20, 13  - duonebs, steroids, azithromycin  -Telemetry and pulse oximetry     Hypertension  -well Controlled       BP Readings from Last 1 Encounters:   05/03/24 129/64      - Continue metoprolol  - Monitor while admitted      CAD  - cont asa, imdur     Hypothyroidism  - cont synthroid     HPL  - cont statin    I discussed the patients findings and my recommendations with patient and nursing staff.     Discharge Diagnosis:      Shortness of breath      Hospital Course  Patient is a 78 y.o. female presented with shortness of breath. Er evaluated and pt  placed in observation.  Labs including cbc, procal, bnp, bmp, trop, EKG are unremarkable. Chest xray showing mild vascular congestion. Rpp is + for parainfluenza virus. Pt given steroids, duonebs and azithromycin. Pt will be sent home with these. Discharge  discussed with pt and she is agreeable to plan. Instructed pt to return to er if symptoms reoccur or worsen.      Past Medical History:     Past Medical History:   Diagnosis Date    Atrial fibrillation     Coronary artery disease     Dyslipidemia     Hypertension     Hypothyroidism     ILD (interstitial lung disease)     Lung fibrosis     Lupus     Lupus     Raynaud's disease        Past Surgical History:     Past Surgical History:   Procedure Laterality Date    ATRIAL APPENDAGE EXCLUSION LEFT WITH TRANSESOPHAGEAL ECHOCARDIOGRAM N/A 8/19/2021    Procedure: Atrial Appendage Occlusion;  Surgeon: Eric Low MD;  Location: [facility] CATH INVASIVE LOCATION;  Service: Cardiovascular;  Laterality: N/A;    ATRIAL APPENDAGE EXCLUSION LEFT WITH TRANSESOPHAGEAL ECHOCARDIOGRAM N/A 8/19/2021    Procedure: Atrial Appendage Occlusion;  Surgeon: Francisco Garza MD;  Location: [facility] CATH INVASIVE LOCATION;  Service: Cardiovascular;  Laterality: N/A;    CARDIAC CATHETERIZATION      CARDIAC ELECTROPHYSIOLOGY PROCEDURE N/A 4/11/2023    Procedure: Pacemaker DC new  Abbott aware;  Surgeon: Eric Low MD;  Location: [facility] CATH INVASIVE LOCATION;  Service: Cardiovascular;  Laterality: N/A;    CATARACT EXTRACTION      CHOLECYSTECTOMY      ENDOSCOPY N/A 8/20/2021    Procedure: ESOPHAGOGASTRODUODENOSCOPY;  Surgeon: Joao Cross MD;  Location: [facility] ENDOSCOPY;  Service: Gastroenterology;  Laterality: N/A;  submucosal ecchymosis 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    HYSTERECTOMY      REPLACEMENT TOTAL KNEE BILATERAL         Social History:   Social History     Socioeconomic History    Marital status:    Tobacco Use    Smoking  status: Never     Passive exposure: Past    Smokeless tobacco: Never   Vaping Use    Vaping status: Never Used   Substance and Sexual Activity    Alcohol use: Not Currently    Drug use: Never    Sexual activity: Defer       Procedures Performed         Consults:   Consults       No orders found from 4/3/2024 to 5/3/2024.            Condition on Discharge:     Stable    Discharge Disposition      Discharge Medications     Discharge Medications        ASK your doctor about these medications        Instructions Start Date   albuterol sulfate  (90 Base) MCG/ACT inhaler  Commonly known as: PROVENTIL HFA;VENTOLIN HFA;PROAIR HFA   albuterol sulfate HFA 90 mcg/actuation aerosol inhaler   2 PUFFS EVERY 6 HOURS AS NEEDED FOR SHORTNESS OF BREATH/WHEEZING      aspirin 81 MG EC tablet   81 mg, Oral, Daily      buPROPion  MG 24 hr tablet  Commonly known as: WELLBUTRIN XL   300 mg, Oral, Daily      hydroxychloroquine 200 MG tablet  Commonly known as: PLAQUENIL   200 mg, Oral, Daily      isosorbide mononitrate 30 MG 24 hr tablet  Commonly known as: IMDUR   30 mg, Oral, Every Morning      levothyroxine 150 MCG tablet  Commonly known as: SYNTHROID, LEVOTHROID   1 tablet, Oral, Daily      metoprolol succinate XL 50 MG 24 hr tablet  Commonly known as: TOPROL-XL   50 mg, Oral, Daily      mycophenolate 250 MG capsule  Commonly known as: CELLCEPT   mycophenolate mofetil 250 mg capsule   TAKE 1 CAPSULE BY MOUTH TWICE A DAY      nitroglycerin 0.4 MG SL tablet  Commonly known as: Nitrostat   0.4 mg, Sublingual, Every 5 Minutes PRN, Take no more than 3 doses in 15 minutes.      PARoxetine 30 MG tablet  Commonly known as: PAXIL   30 mg, Oral, Daily      rosuvastatin 10 MG tablet  Commonly known as: CRESTOR   10 mg, Oral, Daily               Discharge Diet:     Activity at Discharge:     Follow-up Appointments  Future Appointments   Date Time Provider Department Center   8/5/2024 10:30 AM [facility]    9/24/2024 10:00 AM [facility]   9/24/2024 10:30 AM Eric Low MD [facility]         Test Results Pending at Discharge       Risk for Readmission (LACE) Score: 8 (5/4/2024  6:00 AM)      Less Than 30 minutes spent in discharge activities for this patient    Signature:Electronically signed by Sherrie Murrieta PA-C, 05/04/24, 9:36 AM EDT.

## 2024-05-04 NOTE — CASE MANAGEMENT/SOCIAL WORK
Case Management Discharge Note      Final Note: Home with spouse     Transportation Services  Private: Car    Final Discharge Disposition Code: 01 - home or self-care

## 2024-05-08 ENCOUNTER — READMISSION MANAGEMENT (OUTPATIENT)
Dept: CALL CENTER | Facility: HOSPITAL | Age: 79
End: 2024-05-08
Payer: MEDICARE

## 2024-08-05 ENCOUNTER — OFFICE VISIT (OUTPATIENT)
Dept: PULMONOLOGY | Facility: HOSPITAL | Age: 79
End: 2024-08-05
Payer: MEDICARE

## 2024-08-05 VITALS
RESPIRATION RATE: 16 BRPM | HEART RATE: 58 BPM | DIASTOLIC BLOOD PRESSURE: 68 MMHG | HEIGHT: 63 IN | OXYGEN SATURATION: 97 % | BODY MASS INDEX: 31.89 KG/M2 | SYSTOLIC BLOOD PRESSURE: 123 MMHG | WEIGHT: 180 LBS

## 2024-08-05 DIAGNOSIS — J96.11 CHRONIC RESPIRATORY FAILURE WITH HYPOXIA: ICD-10-CM

## 2024-08-05 DIAGNOSIS — J84.9 ILD (INTERSTITIAL LUNG DISEASE): Primary | ICD-10-CM

## 2024-08-05 DIAGNOSIS — G47.30 SLEEP APNEA, UNSPECIFIED TYPE: ICD-10-CM

## 2024-08-05 PROCEDURE — G0463 HOSPITAL OUTPT CLINIC VISIT: HCPCS

## 2024-08-05 RX ORDER — BISOPROLOL FUMARATE 10 MG/1
5 TABLET, FILM COATED ORAL DAILY
COMMUNITY

## 2024-08-05 RX ORDER — ALPRAZOLAM 0.5 MG/1
TABLET ORAL
COMMUNITY
Start: 2024-06-19

## 2024-08-05 NOTE — PROGRESS NOTES
HPI:  F/u for pulmonary and sleep.     Patient reports shortness of breath started years ago but was progressively getting worse until started on mycophenolate and since then the shortness of breath is  stable, also having minimal dry cough.  No fever or weight loss     Never smoked but exposed to secondhand smoking.  No indoor pets, no exposure to birds.  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     Father had chronic lung disease but probably from smoking pipe, no known history of interstitial lung disease       Past Medical History:   Diagnosis Date    Atrial fibrillation     Coronary artery disease     Dyslipidemia     Hypertension     Hypothyroidism     ILD (interstitial lung disease)     Lung fibrosis     Lupus     Lupus     Raynaud's disease         Current Outpatient Medications on File Prior to Visit   Medication Sig Dispense Refill    albuterol sulfate  (90 Base) MCG/ACT inhaler albuterol sulfate HFA 90 mcg/actuation aerosol inhaler   2 PUFFS EVERY 6 HOURS AS NEEDED FOR SHORTNESS OF BREATH/WHEEZING      ALPRAZolam (XANAX) 0.5 MG tablet TAKE ONE AT BEDTIME FOR ANXIETY/TROUBLE SLEEPING      aspirin 81 MG EC tablet Take 1 tablet by mouth Daily. 90 tablet 3    azithromycin (ZITHROMAX) 250 MG tablet Indications: COPD Exacerbation Suppression 2 tablet 0    bisoprolol (ZEBeta) 10 MG tablet Take 0.5 tablets by mouth Daily.      buPROPion XL (WELLBUTRIN XL) 300 MG 24 hr tablet Take 1 tablet by mouth Daily.      isosorbide mononitrate (IMDUR) 30 MG 24 hr tablet Take 1 tablet by mouth Every Morning. 90 tablet 3    levothyroxine (SYNTHROID, LEVOTHROID) 150 MCG tablet Take 1 tablet by mouth Daily.      metoprolol succinate XL (TOPROL-XL) 50 MG 24 hr tablet Take 1 tablet by mouth Daily. 90 tablet 3    mycophenolate (CELLCEPT) 250 MG capsule mycophenolate mofetil 250 mg capsule   TAKE 1 CAPSULE BY MOUTH TWICE A DAY       "nitroglycerin (Nitrostat) 0.4 MG SL tablet Place 1 tablet under the tongue Every 5 (Five) Minutes As Needed for Chest Pain. Take no more than 3 doses in 15 minutes. 30 tablet 12    PARoxetine (PAXIL) 30 MG tablet Take 1 tablet by mouth Daily.      rosuvastatin (CRESTOR) 10 MG tablet Take 1 tablet by mouth Daily.      hydroxychloroquine (PLAQUENIL) 200 MG tablet Take 1 tablet by mouth Daily. (Patient not taking: Reported on 8/5/2024)      [DISCONTINUED] predniSONE (DELTASONE) 10 MG tablet Take 1 tablet by mouth Daily. Take 2 tablets twice daily for 3 days then take 1 tablet twice daily for 3 days the take 1 tablet daily for 3 days 21 tablet 0     No current facility-administered medications on file prior to visit.        Social History     Tobacco Use    Smoking status: Never     Passive exposure: Past    Smokeless tobacco: Never   Vaping Use    Vaping status: Never Used   Substance Use Topics    Alcohol use: Not Currently    Drug use: Never        Family History   Problem Relation Age of Onset    No Known Problems Mother     Lung disease Father         Review of system:  Constitutional: Negative for chills, fever and malaise/fatigue.   HENT: Negative.    Eyes: Negative.    Cardiovascular: Negative.    Respiratory: negative for cough and shortness of breath.    Skin: Negative.    Musculoskeletal: Chronic arthritis  Gastrointestinal: Negative.    Genitourinary: Negative.    Neurological: Negative.    Psychiatric/Behavioral: Negative.    Physical exam:  Blood pressure 123/68, pulse 58, resp. rate 16, height 160 cm (63\"), weight 81.6 kg (180 lb), SpO2 97%.    General Appearance:  Alert   HEENT:  Normocephalic, without obvious abnormality, Conjunctiva/corneas clear,.   Nares normal, no drainage     Neck:  Supple, symmetrical, trachea midline. No JVD.  Lungs /Chest wall:   Mild bibasilar dry crackles, respirations unlabored, symmetrical wall movement.     Heart:  Regular rate and rhythm, S1 S2 normal  Abdomen: Soft, " non-tender, no masses, no organomegaly.    Extremities: No edema, no clubbing or cyanosis    No radiology results for the last 90 days.   Results for orders placed during the hospital encounter of 10/04/21    Adult Transesophageal Echo (TON) W/ Cont if Necessary Per Protocol    Interpretation Summary  TON   procedure note    Procedure:  TON    Indication:   georgette Adams    Date of procedure  : 10/4/2021    :  Dr. Eric Low    Description of procedure:    Under conscious sedation given by anesthesiology and local anesthesia, a TON probe was advanced into the esophagus and into the stomach 2D, M-mode, color Doppler images were obtained..  Patient tolerated procedure well complications well.    Complications: none    Results:    Normal LV size and contractility LV contractility, EF of 60%  Normal RV size  Biatrial enlargement seen.  Left atrial appendage has watchman left atrial appendage occluder device seated well.  Aortic valve, mitral valve, tricuspid valve appears structurally normal, moderate aortic, mitral, tricuspid seen.  Elevated RV systolic pressures at 58 mmHg consistent with pulmonary hypertension seen  No vegetation seen  No pericardial effusion seen.  Proximal aorta appears normal in size.  Mild aortic plaque seen      Recommendations/plan:    Clinical correlation recommended        Assessment and plan:  Interstitial lung disease: Most likely related to connective tissue disease, it has progressed on CT scan from 2013 to August 2021 but no significant change with the latest CT scan 10/2023 and the PFTs has been stable     History of lupus and rheumatoid arthritis: Currently on hydroxychloroquine and started on mycophenolate July 2022 by her rheumatologist      PFTs done  March 2023, FVC 68%, FEV1 78%, TLC 62%, DLCO 48%, August 2022 FVC 61%, FEV1 69%, TLC 73%, DLCO 44% which is not significantly changed from December 2021 shows FVC 55% improving to 69% after bronchodilators, FEV1 54%  and improving to 76% after bronchodilators, TLC 77%, DLCO 47%     Was taking Trelegy 1 inhalation once a day: But she did not notice much difference .     Discussed with the patient that since there has not been significant progression in the fibrosis in the last year i.e. less than 10% then we will continue to monitor that with the expectation that immunomodulation with mycophenolate should stabilize the lung disease.     Pulmonary hypertension: TON 10/2021 Elevated RV systolic pressures at 58 mmHg consistent with pulmonary hypertension seen, moderate AI and  MR: can't use Sildenafil as long as she is using Imdur and with valvular heart disease Sildenafil is not the preferred treatment..     Chronic hypoxemic respiratory failure, patient was prescribed oxygen in August 2022 for hypoxemia: Advised to keep saturation 90-95%, patient is compliant and benefiting from therapy     CAD s/p Stent, currently on Imdur and followed by Dr Maranda ALEXANDRE Fib s/p  watchman,  hypertension, dyslipidemia, h/o bradycardia : improved after stopping BB.     ELIS: Home sleep test May 2023 AHI 18, patient could not use the CPAP machine for more than 3 hours, she could not tell a difference when she was using it and her AHI did not improve while on CPAP, currently she does not have the machine since her compliance was not enough: Will continue with the nocturnal oxygen        We will plan to follow PFTs every 12 months and CT if there is signs of progression cristina consider antifibrotic's.  Next PFTs February 2024                I personally reviewed the latest radiological study  Patient is advised to stay up-to-date on immunizations for flu, pneumococcal and COVID-19

## 2024-09-03 ENCOUNTER — HOSPITAL ENCOUNTER (OUTPATIENT)
Dept: RESPIRATORY THERAPY | Facility: HOSPITAL | Age: 79
Discharge: HOME | End: 2024-09-03
Payer: MEDICARE

## 2024-09-03 VITALS — OXYGEN SATURATION: 96 % | HEART RATE: 72 BPM

## 2024-09-03 DIAGNOSIS — J84.9 ILD (INTERSTITIAL LUNG DISEASE): ICD-10-CM

## 2024-09-03 PROCEDURE — 94760 N-INVAS EAR/PLS OXIMETRY 1: CPT

## 2024-09-03 PROCEDURE — 94726 PLETHYSMOGRAPHY LUNG VOLUMES: CPT

## 2024-09-03 PROCEDURE — 94664 DEMO&/EVAL PT USE INHALER: CPT

## 2024-09-03 PROCEDURE — 94729 DIFFUSING CAPACITY: CPT

## 2024-09-03 PROCEDURE — 94640 AIRWAY INHALATION TREATMENT: CPT

## 2024-09-03 PROCEDURE — 94060 EVALUATION OF WHEEZING: CPT

## 2024-09-03 PROCEDURE — 94799 UNLISTED PULMONARY SVC/PX: CPT

## 2024-09-03 RX ORDER — ALBUTEROL SULFATE 90 UG/1
2 AEROSOL, METERED RESPIRATORY (INHALATION) ONCE
Status: COMPLETED | OUTPATIENT
Start: 2024-09-03 | End: 2024-09-03

## 2024-09-03 RX ADMIN — ALBUTEROL SULFATE 2 PUFF: 108 AEROSOL, METERED RESPIRATORY (INHALATION) at 10:16

## 2024-09-13 ENCOUNTER — TELEPHONE (OUTPATIENT)
Dept: CARDIOLOGY | Facility: CLINIC | Age: 79
End: 2024-09-13
Payer: MEDICARE

## 2024-09-24 ENCOUNTER — CLINICAL SUPPORT NO REQUIREMENTS (OUTPATIENT)
Dept: CARDIOLOGY | Facility: CLINIC | Age: 79
End: 2024-09-24
Payer: MEDICARE

## 2024-09-24 ENCOUNTER — OFFICE VISIT (OUTPATIENT)
Dept: CARDIOLOGY | Facility: CLINIC | Age: 79
End: 2024-09-24
Payer: MEDICARE

## 2024-09-24 VITALS
OXYGEN SATURATION: 97 % | SYSTOLIC BLOOD PRESSURE: 126 MMHG | WEIGHT: 188 LBS | HEIGHT: 63 IN | BODY MASS INDEX: 33.31 KG/M2 | DIASTOLIC BLOOD PRESSURE: 87 MMHG | HEART RATE: 75 BPM

## 2024-09-24 DIAGNOSIS — E66.9 OBESITY (BMI 30-39.9): ICD-10-CM

## 2024-09-24 DIAGNOSIS — I49.5 SICK SINUS SYNDROME: Chronic | ICD-10-CM

## 2024-09-24 DIAGNOSIS — I25.10 CAD S/P PERCUTANEOUS CORONARY ANGIOPLASTY: Primary | ICD-10-CM

## 2024-09-24 DIAGNOSIS — Z98.61 CAD S/P PERCUTANEOUS CORONARY ANGIOPLASTY: Primary | ICD-10-CM

## 2024-09-24 DIAGNOSIS — Z95.818 PRESENCE OF WATCHMAN LEFT ATRIAL APPENDAGE CLOSURE DEVICE: ICD-10-CM

## 2024-09-24 DIAGNOSIS — I48.0 PAROXYSMAL ATRIAL FIBRILLATION: ICD-10-CM

## 2024-09-24 DIAGNOSIS — R00.1 BRADYCARDIA: Primary | Chronic | ICD-10-CM

## 2024-09-24 DIAGNOSIS — Z95.0 PACEMAKER: ICD-10-CM

## 2024-09-24 DIAGNOSIS — I10 ESSENTIAL HYPERTENSION: ICD-10-CM

## 2024-09-24 DIAGNOSIS — E78.5 DYSLIPIDEMIA: ICD-10-CM

## 2024-09-24 PROCEDURE — 3079F DIAST BP 80-89 MM HG: CPT | Performed by: INTERNAL MEDICINE

## 2024-09-24 PROCEDURE — 93280 PM DEVICE PROGR EVAL DUAL: CPT | Performed by: INTERNAL MEDICINE

## 2024-09-24 PROCEDURE — 3074F SYST BP LT 130 MM HG: CPT | Performed by: INTERNAL MEDICINE

## 2024-09-24 PROCEDURE — 1159F MED LIST DOCD IN RCRD: CPT | Performed by: INTERNAL MEDICINE

## 2024-09-24 PROCEDURE — 99214 OFFICE O/P EST MOD 30 MIN: CPT | Performed by: INTERNAL MEDICINE

## 2024-09-24 PROCEDURE — 1160F RVW MEDS BY RX/DR IN RCRD: CPT | Performed by: INTERNAL MEDICINE

## 2024-10-09 PROCEDURE — 93296 REM INTERROG EVL PM/IDS: CPT | Performed by: INTERNAL MEDICINE

## 2024-10-09 PROCEDURE — 93294 REM INTERROG EVL PM/LDLS PM: CPT | Performed by: INTERNAL MEDICINE

## 2024-12-18 ENCOUNTER — TELEPHONE (OUTPATIENT)
Dept: CARDIOLOGY | Facility: CLINIC | Age: 79
End: 2024-12-18
Payer: MEDICARE

## 2024-12-18 DIAGNOSIS — I10 PRIMARY HYPERTENSION: ICD-10-CM

## 2024-12-18 DIAGNOSIS — I47.29 NON-SUSTAINED VENTRICULAR TACHYCARDIA: Primary | ICD-10-CM

## 2024-12-18 DIAGNOSIS — I50.32 CHRONIC DIASTOLIC HEART FAILURE: ICD-10-CM

## 2024-12-18 NOTE — TELEPHONE ENCOUNTER
Called patient to discuss.  Episode to non sustain VT on Renny 12/15.  She did feel bad, but no chest pains       She had labs drawn at Dr. Us office this morning please have those sent to us.

## 2024-12-18 NOTE — TELEPHONE ENCOUNTER
Attempted to call patient to discuss episodes of tachycardia on device check.     No answer left message to call back.     Is patient having any symptoms?  Chest pain, palpitations, lightheadedness?      Please have labs drawn... bmp, mag, TSH   Which has been ordered

## 2024-12-20 ENCOUNTER — TELEPHONE (OUTPATIENT)
Dept: CARDIOLOGY | Facility: CLINIC | Age: 79
End: 2024-12-20
Payer: MEDICARE

## 2024-12-20 NOTE — TELEPHONE ENCOUNTER
Any luck on blood work for PCP office??      How is patient's blood pressure and heart rate?        If symptoms of chest pain or shortness of breath go to the ED.

## 2024-12-20 NOTE — TELEPHONE ENCOUNTER
"Caller: Ally Ivory \"Ally Lorenzana\"    Relationship to patient: Self    Best call back number: [phone]     Patient is needing: PT STATED SHE IS RETURNING A CALL BUT NOTHING IN CHART IN REGARDS TO ANYONE REACHING OUT TODAY. IF SOMEONE DID PLS CALL PT AGAIN AND ADVISE.           "

## 2024-12-20 NOTE — TELEPHONE ENCOUNTER
Pt states she has been very fatigued for the past few days. No chest pain or palpitations. Advised her that we are waiting for labs and Neha will call back.

## 2024-12-23 NOTE — TELEPHONE ENCOUNTER
LABORATORY - SCAN - TSH/LIPID PANEL/CMP/CBC/MULTI LABS, [facility], 12/13/2024 (12/13/2024)

## 2025-01-08 ENCOUNTER — TELEPHONE (OUTPATIENT)
Dept: CARDIOLOGY | Facility: CLINIC | Age: 80
End: 2025-01-08
Payer: MEDICARE

## 2025-01-08 NOTE — TELEPHONE ENCOUNTER
Attempted to reach patient about overdue labs. Phone went straight to voicemail and voicemail is not set up yet.

## 2025-01-15 ENCOUNTER — OFFICE VISIT (OUTPATIENT)
Dept: CARDIOLOGY | Facility: CLINIC | Age: 80
End: 2025-01-15
Payer: MEDICARE

## 2025-01-15 VITALS
SYSTOLIC BLOOD PRESSURE: 144 MMHG | DIASTOLIC BLOOD PRESSURE: 81 MMHG | BODY MASS INDEX: 34.9 KG/M2 | OXYGEN SATURATION: 96 % | WEIGHT: 197 LBS | HEART RATE: 80 BPM

## 2025-01-15 DIAGNOSIS — I48.0 PAROXYSMAL ATRIAL FIBRILLATION: ICD-10-CM

## 2025-01-15 DIAGNOSIS — Z95.818 PRESENCE OF WATCHMAN LEFT ATRIAL APPENDAGE CLOSURE DEVICE: ICD-10-CM

## 2025-01-15 DIAGNOSIS — Z95.0 PACEMAKER: ICD-10-CM

## 2025-01-15 DIAGNOSIS — R06.09 DYSPNEA ON EXERTION: ICD-10-CM

## 2025-01-15 DIAGNOSIS — E66.9 OBESITY (BMI 30-39.9): ICD-10-CM

## 2025-01-15 DIAGNOSIS — I25.10 CAD S/P PERCUTANEOUS CORONARY ANGIOPLASTY: ICD-10-CM

## 2025-01-15 DIAGNOSIS — R07.2 PRECORDIAL PAIN: Primary | ICD-10-CM

## 2025-01-15 DIAGNOSIS — Z98.61 CAD S/P PERCUTANEOUS CORONARY ANGIOPLASTY: ICD-10-CM

## 2025-01-15 DIAGNOSIS — I10 ESSENTIAL HYPERTENSION: ICD-10-CM

## 2025-01-15 DIAGNOSIS — I35.1 NONRHEUMATIC AORTIC VALVE INSUFFICIENCY: ICD-10-CM

## 2025-01-15 DIAGNOSIS — E78.5 DYSLIPIDEMIA: ICD-10-CM

## 2025-01-15 DIAGNOSIS — I27.20 PULMONARY HYPERTENSION: ICD-10-CM

## 2025-01-15 PROCEDURE — 3079F DIAST BP 80-89 MM HG: CPT | Performed by: INTERNAL MEDICINE

## 2025-01-15 PROCEDURE — 99214 OFFICE O/P EST MOD 30 MIN: CPT | Performed by: INTERNAL MEDICINE

## 2025-01-15 PROCEDURE — 3077F SYST BP >= 140 MM HG: CPT | Performed by: INTERNAL MEDICINE

## 2025-01-15 PROCEDURE — 1160F RVW MEDS BY RX/DR IN RCRD: CPT | Performed by: INTERNAL MEDICINE

## 2025-01-15 PROCEDURE — 1159F MED LIST DOCD IN RCRD: CPT | Performed by: INTERNAL MEDICINE

## 2025-01-15 PROCEDURE — 93000 ELECTROCARDIOGRAM COMPLETE: CPT | Performed by: INTERNAL MEDICINE

## 2025-01-15 RX ORDER — ERGOCALCIFEROL 1.25 MG/1
1 CAPSULE, LIQUID FILLED ORAL WEEKLY
COMMUNITY
Start: 2024-12-22

## 2025-01-15 RX ORDER — METOPROLOL SUCCINATE 50 MG/1
50 TABLET, EXTENDED RELEASE ORAL DAILY
Qty: 90 TABLET | Refills: 3 | Status: SHIPPED | OUTPATIENT
Start: 2025-01-15

## 2025-01-15 RX ORDER — ATORVASTATIN CALCIUM 10 MG/1
TABLET, FILM COATED ORAL
COMMUNITY
Start: 2024-12-18

## 2025-01-15 NOTE — PROGRESS NOTES
Subjective:     Encounter Date:01/15/2025      Patient ID: Ally Ivory is a 79 y.o. female.    Chief Complaint and history of present illness:     Follow-up for CAD, PCI, A. fib, watchman, pacemaker     History of Present Illness  :      Ms. Ally Ivory has PMH of     #.CAD, JUAN , cath 5/9/18 Severe disease in OM1 branch of LCX,FFR RCA 0.97  Patent stent in ostial right with moderate InStent stenosis and noncritical FFR at 0.90  Elevated  LVEDP with normal LV systolic function. cath  01/21/2019 PTCA to instent  RCA.  Repeat stent to RCA in [place] in 2019     #. P. A.FIB  , long-term anticoagulation, s/p watchman 8/19/2021, post watchman 1 year TON 10/4/21 revealed a PA systolic pressure 58 mmHg.  [id number] SCORE   [id number] Score: 7 (1/15/2025 10:44 AM)     age greater than 75, female gender, CHF, hypertension, vascular disease, diabetes  [id number] Score: 7 (3/6/2024  3:54 PM)  [id number] Score: 7 (9/5/2023 10:30 AM)     # SSS, Abbott dual-chamber pacemaker 4/11/2023  #.  Moderate aortic regurgitation and mitral regurgitation  #   dyslipidemia,statin allergy  #.  Orthostatic hypotension  #  hypertension, positive family history, obesity, hypothyroidism.  #  history of lupus, thrombocytopenia, pernicious anemia. psoriasis, Raynaud's, Sjogren's, RA  #.  Interstitial lung disease, PFTs 12/2021 FVC 55%-improved to 69 after bronchodilators, FEV1 54% improved to 76%, TLC 77%, DLCO 47%-/10 Becca Chan MD  #.  Pulmonary hypertension, TON 10/2021 elevated RV systolic pressure 58 mmHg  #   cholecystectomy  #.  moderate MR and -moderate AI, last echo 20 60  #  type 2 diabetes. (Patient says she is not diabetic.)        Here for   followup.  Patient is complaining of chest pain, shortness of breath, lightheadedness and dizziness.  Is grieving her 's death from May 6, 2024, is confused about medications that she should be taking..     Patient's arterial blood pressure is 144/81, heart rate  80 bpm, O2 sat of 96% on room air...  Patient's BMI is over 30.     Patient  had labs done 08/20/2018 with rheumatology CMP was unremarkable.  Labs done 11/22/2019 revealed normal CMP, CK, cholesterol of 191, HDL 40  triglycerides 148, Labs from 11/15/2020 reveal cholesterol 95 triglycerides 90 HDL 41 LDL 36 CMP and Ck unremarkable.  Labs from 6/3/2021 revealed normal CMP, CBC with a platelet count of 114.  Labs 8/19/2021 revealed normal CBC with platelet count of 77 and Chem-7 with low calcium. Labs from 10/1/2021 revealed normal Chem-7 and CBC  CT chest with contrast done 1/26/2022 revealed symmetric groundglass opacities seen throughout all lung fields likely represent pulmonary edema or other differential include infectious and inflammatory etiologies clinical correlation recommended.  Patient had splenomegaly.       Labs from 8/5/2022 revealed normal CMP, troponin x3 and proBNP at 603.  CBC with a platelet count of 91.  Labs 4/12/2023 reveal CBC with a platelet count of 70 and BMP with a glucose of 101.  Labs from 8/5/2022 reveal normal proBNP of 603.  Labs from 1/12/2024 reveal normal CMP.  CBC with a hemoglobin of 13.5 and platelet count of 112.  Labs from 5/2/2024 reveal normal proBNP of 119.  CMP with creatinine of 1.2 and EGFR 46.  Potassium was low 3.4 repeat was 3.9.  CBC with a platelet count of 105.  Labs from 12/13/2024 reveal normal TSH at 3.9, lipid profile with cholesterol 180, triglycerides 149, HDL low at 38, .  UA without protein.  Normal CMP CBC with a platelet count of 121.  Labs from 12/28/2024 reveal normal CRP, ESR, vitamin D, CMP, CBC except platelet count of 110.         ASSESSMENT:     #SSS, PPM  #Atrial fibrillation, status post watchman  #Dyslipidemia  # . chronic CHF due to diastolic dysfunction with normal LV systolic function in the past  #.  Moderate aortic regurgitation, severe pulmonary hypertension  #.   CAD ,PCI  #.  hypertension, dyslipidemia  #  PVC  #.   moderate MR and-moderate AI  #Obesity BMI over 30, pulmonary hypertension  #Thrombocytopenia         PLAN:     Patient is complaining of chest pain, shortness of breath, lightheadedness and tingling.  EKG is unremarkable.  Continue medical management with aspirin, atorvastatin, isosorbide and add low-dose metoprolol..  Patient previously had a stress test 3/28/2024 which revealed small area of inferolateral ischemia.  Was being treated medically.  Will restart her on metoprolol and continue isosorbide and see if patient has symptom relief.  Will schedule stress test.  Patient says she cannot walk due to musculoskeletal reasons we will do Lexiscan Cardiolite.  Patient is complaining of dyspnea on exertion.  Patient has pulmonary hypertension and aortic regurgitation is complaining of dyspnea will check an echocardiogram.  Patient's remote pacemaker check is revealing normal function.  Will follow-up in pacemaker clinic.     Patient's [id number]-JWAr over is 7,due to female gender, age over 75, hypertension, congestive heart failure, diabetes, vascular disease,will benefit from long-term anticoagulation.  But patient has thrombocytopenia which makes her a poor candidate for long-term anticoagulation.  Therefore underwent watchman 8/19/2021.     Patient's BMI is over 30, counseled on weight loss diet and exercise.  Follow-up with pulmonary and rheumatology.        Procedures:     Lexiscan Cardiolite performed 8/5/2022 reveals  EF of 66% with small sized infarct in apex.      EKG done 3/6/2024 reviewed/interpreted by me reveals sinus rhythm with rate of 80 bpm     Lexiscan Cardiolite performed 3/20/2024 reviewed/interpreted by me reveals inferolateral ischemia EF of 85%              ECG 12 Lead    Date/Time: 1/15/2025 10:47 AM  Performed by: Eric Low MD    Authorized by: Eric Low MD  Comparison: compared with previous ECG from 3/6/2024  Comparison to previous ECG: EKG done today  reviewed/interpreted by me reveals sinus rhythm with rate of 77 bpm, 1 PVC seen, no significant change compared EKG from 3/6/2024          Copied text in this portion of the note has been reviewed and is accurate as of 1/15/2025  The following portions of the patient's history were reviewed and updated as appropriate: allergies, current medications, past family history, past medical history, past social history, past surgical history and problem list.    Assessment:         [facility]       Diagnosis Plan   1. Precordial pain  ECG 12 Lead    Adult Transthoracic Echo Complete W/ Cont if Necessary Per Protocol    Stress Test With Myocardial Perfusion One Day      2. Dyspnea on exertion  ECG 12 Lead    Adult Transthoracic Echo Complete W/ Cont if Necessary Per Protocol    Stress Test With Myocardial Perfusion One Day      3. CAD S/P percutaneous coronary angioplasty  ECG 12 Lead    Adult Transthoracic Echo Complete W/ Cont if Necessary Per Protocol    Stress Test With Myocardial Perfusion One Day      4. Essential hypertension  ECG 12 Lead    Adult Transthoracic Echo Complete W/ Cont if Necessary Per Protocol    Stress Test With Myocardial Perfusion One Day      5. Dyslipidemia  ECG 12 Lead    Adult Transthoracic Echo Complete W/ Cont if Necessary Per Protocol    Stress Test With Myocardial Perfusion One Day      6. Paroxysmal atrial fibrillation  ECG 12 Lead    Adult Transthoracic Echo Complete W/ Cont if Necessary Per Protocol    Stress Test With Myocardial Perfusion One Day      7. Presence of Watchman left atrial appendage closure device  ECG 12 Lead    Adult Transthoracic Echo Complete W/ Cont if Necessary Per Protocol    Stress Test With Myocardial Perfusion One Day      8. Pacemaker  ECG 12 Lead    Adult Transthoracic Echo Complete W/ Cont if Necessary Per Protocol    Stress Test With Myocardial Perfusion One Day      9. Obesity (BMI 30-39.9)  ECG 12 Lead    Adult Transthoracic Echo Complete W/ Cont if Necessary Per  Protocol    Stress Test With Myocardial Perfusion One Day      10. Nonrheumatic aortic valve insufficiency        11. Pulmonary hypertension               Plan:               Past Medical History:  Past Medical History:   Diagnosis Date    Atrial fibrillation     Coronary artery disease     Dyslipidemia     Hypertension     Hypothyroidism     ILD (interstitial lung disease)     Lung fibrosis     Lupus     Lupus     Raynaud's disease      Past Surgical History:  Past Surgical History:   Procedure Laterality Date    ATRIAL APPENDAGE EXCLUSION LEFT WITH TRANSESOPHAGEAL ECHOCARDIOGRAM N/A 8/19/2021    Procedure: Atrial Appendage Occlusion;  Surgeon: Eric Low MD;  Location: [facility] CATH INVASIVE LOCATION;  Service: Cardiovascular;  Laterality: N/A;    ATRIAL APPENDAGE EXCLUSION LEFT WITH TRANSESOPHAGEAL ECHOCARDIOGRAM N/A 8/19/2021    Procedure: Atrial Appendage Occlusion;  Surgeon: Francisco Garza MD;  Location: [facility] CATH INVASIVE LOCATION;  Service: Cardiovascular;  Laterality: N/A;    CARDIAC CATHETERIZATION      CARDIAC ELECTROPHYSIOLOGY PROCEDURE N/A 4/11/2023    Procedure: Pacemaker DC new  Abbott aware;  Surgeon: Eric Low MD;  Location: [facility] CATH INVASIVE LOCATION;  Service: Cardiovascular;  Laterality: N/A;    CATARACT EXTRACTION      CHOLECYSTECTOMY      ENDOSCOPY N/A 8/20/2021    Procedure: ESOPHAGOGASTRODUODENOSCOPY;  Surgeon: Joao Cross MD;  Location: [facility] ENDOSCOPY;  Service: Gastroenterology;  Laterality: N/A;  submucosal ecchymosis 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    HYSTERECTOMY      REPLACEMENT TOTAL KNEE BILATERAL        Allergies:  Allergies   Allergen Reactions    Pravastatin Rash     rash      Vancomycin Hives    Rosuvastatin Calcium GI Intolerance and Diarrhea     Home Meds:  Current Meds:     Current Outpatient Medications:     aspirin 81 MG EC tablet, Take 1 tablet by mouth Daily., Disp: 90 tablet, Rfl: 3    atorvastatin  (LIPITOR) 10 MG tablet, Take 1 tablet every day by oral route for 30 days, for high cholesterol., Disp: , Rfl:     hydroxychloroquine (PLAQUENIL) 200 MG tablet, Take 1 tablet by mouth Daily., Disp: , Rfl:     isosorbide mononitrate (IMDUR) 30 MG 24 hr tablet, Take 1 tablet by mouth Every Morning., Disp: 90 tablet, Rfl: 3    levothyroxine (SYNTHROID, LEVOTHROID) 150 MCG tablet, Take 1 tablet by mouth Daily., Disp: , Rfl:     metoprolol succinate XL (TOPROL-XL) 50 MG 24 hr tablet, Take 1 tablet by mouth Daily., Disp: 90 tablet, Rfl: 3    PARoxetine (PAXIL) 30 MG tablet, Take 1 tablet by mouth Daily., Disp: , Rfl:     vitamin D (ERGOCALCIFEROL) 1.25 MG (10441 UT) capsule capsule, Take 1 capsule by mouth 1 (One) Time Per Week., Disp: , Rfl:   Social History:   Social History     Tobacco Use    Smoking status: Never     Passive exposure: Past    Smokeless tobacco: Never   Substance Use Topics    Alcohol use: Not Currently      Family History:  Family History   Problem Relation Age of Onset    No Known Problems Mother     Lung disease Father               Review of Systems   Cardiovascular:  Positive for chest pain. Negative for leg swelling and palpitations.   Respiratory:  Positive for shortness of breath.    Neurological:  Positive for dizziness. Negative for numbness.     All other systems are negative         Objective:     Physical Exam  /81 (BP Location: Left arm, Patient Position: Sitting, Cuff Size: Large Adult)   Pulse 80   Wt 89.4 kg (197 lb)   SpO2 96%   BMI 34.90 kg/m²   General:  Appears in no acute distress  Eyes: Sclera is anicteric,  conjunctiva is clear   HEENT:  No JVD.  No carotid bruits  Respiratory: Respirations regular and unlabored at rest.  Clear to auscultation  Cardiovascular: S1,S2 Regular rate and rhythm. .   Extremities: No digital clubbing or cyanosis, no edema  Skin: Color pink. Skin warm and dry to touch. No rashes  No xanthoma  Neuro: Alert and awake.    Lab Reviewed:      "    Eric Low MD  1/15/2025 10:58 EST      EMR Dragon/Transcription:   \"Dictated utilizing Dragon dictation\".        "

## 2025-02-10 ENCOUNTER — OFFICE VISIT (OUTPATIENT)
Dept: PULMONOLOGY | Facility: HOSPITAL | Age: 80
End: 2025-02-10
Payer: MEDICARE

## 2025-02-10 VITALS
HEART RATE: 60 BPM | HEIGHT: 63 IN | BODY MASS INDEX: 34.2 KG/M2 | SYSTOLIC BLOOD PRESSURE: 108 MMHG | OXYGEN SATURATION: 97 % | DIASTOLIC BLOOD PRESSURE: 64 MMHG | RESPIRATION RATE: 14 BRPM | WEIGHT: 193 LBS

## 2025-02-10 DIAGNOSIS — G47.30 SLEEP APNEA, UNSPECIFIED TYPE: ICD-10-CM

## 2025-02-10 DIAGNOSIS — J84.9 ILD (INTERSTITIAL LUNG DISEASE): Primary | ICD-10-CM

## 2025-02-10 DIAGNOSIS — J96.11 CHRONIC RESPIRATORY FAILURE WITH HYPOXIA: ICD-10-CM

## 2025-02-10 PROCEDURE — G0463 HOSPITAL OUTPT CLINIC VISIT: HCPCS

## 2025-02-10 NOTE — PROGRESS NOTES
HPI:  Patient reports shortness of breath started years ago but was progressively getting worse until started on mycophenolate and since then the shortness of breath is  stable, also having minimal dry cough.  No fever or weight loss     Never smoked but exposed to secondhand smoking.  No indoor pets, no exposure to birds.  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 90 tablet 3    atorvastatin (LIPITOR) 10 MG tablet Take 1 tablet every day by oral route for 30 days, for high cholesterol.      hydroxychloroquine (PLAQUENIL) 200 MG tablet Take 1 tablet by mouth Daily.      isosorbide mononitrate (IMDUR) 30 MG 24 hr tablet Take 1 tablet by mouth Every Morning. 90 tablet 3    levothyroxine (SYNTHROID, LEVOTHROID) 150 MCG tablet Take 1 tablet by mouth Daily.      metoprolol succinate XL (TOPROL-XL) 50 MG 24 hr tablet Take 1 tablet by mouth Daily. 90 tablet 3    PARoxetine (PAXIL) 30 MG tablet Take 1 tablet by mouth Daily.      vitamin D (ERGOCALCIFEROL) 1.25 MG (72691 UT) capsule capsule Take 1 capsule by mouth 1 (One) Time Per Week.       No current facility-administered medications on file prior to visit.        Social History     Tobacco Use    Smoking status: Never     Passive exposure: Past    Smokeless tobacco: Never   Vaping Use    Vaping status: Never Used   Substance Use Topics    Alcohol use: Not Currently    Drug use:  "Never        Family History   Problem Relation Age of Onset    No Known Problems Mother     Lung disease Father         Review of system:  Constitutional: Negative for chills, fever and malaise/fatigue.   HENT: Negative.    Eyes: Negative.    Cardiovascular: Negative.    Respiratory: chronic exertional shortness of breath.    Skin: Negative.    Musculoskeletal: Negative.    Gastrointestinal: Negative.    Genitourinary: Negative.    Neurological: Negative.    Psychiatric/Behavioral: Negative.    Physical exam:  Blood pressure 108/64, pulse 60, resp. rate 14, height 160 cm (63\"), weight 87.5 kg (193 lb), SpO2 97%.    General Appearance:  Alert   HEENT:  Normocephalic, without obvious abnormality, Conjunctiva/corneas clear,.   Nares normal, no drainage     Neck:  Supple, symmetrical, trachea midline. No JVD.  Lungs /Chest wall:   dry crackles Bilaterlly, respirations unlabored, symmetrical wall movement.     Heart:  Regular rate and rhythm, S1 S2 normal  Abdomen: Soft, non-tender, no masses, no organomegaly.    Extremities: No edema, no clubbing or cyanosis    No radiology results for the last 90 days.   Results for orders placed during the hospital encounter of 10/04/21    Adult Transesophageal Echo (TON) W/ Cont if Necessary Per Protocol    Interpretation Summary  TON   procedure note    Procedure:  TON    Indication:   georgette Adams    Date of procedure  : 10/4/2021    :  Dr. Eric Low    Description of procedure:    Under conscious sedation given by anesthesiology and local anesthesia, a TON probe was advanced into the esophagus and into the stomach 2D, M-mode, color Doppler images were obtained..  Patient tolerated procedure well complications well.    Complications: none    Results:    Normal LV size and contractility LV contractility, EF of 60%  Normal RV size  Biatrial enlargement seen.  Left atrial appendage has watchman left atrial appendage occluder device seated well.  Aortic valve, " mitral valve, tricuspid valve appears structurally normal, moderate aortic, mitral, tricuspid seen.  Elevated RV systolic pressures at 58 mmHg consistent with pulmonary hypertension seen  No vegetation seen  No pericardial effusion seen.  Proximal aorta appears normal in size.  Mild aortic plaque seen      Recommendations/plan:    Clinical correlation recommended        Assessment and plan:    Interstitial lung disease: Most likely related to connective tissue disease, it has progressed on CT scan from 2013 to August 2021 but no significant change with the latest CT scan 10/2023 and the PFTs has been stable     History of lupus and rheumatoid arthritis: Currently on hydroxychloroquine and started on mycophenolate July 2022 by her rheumatologist      PFTs September 2024: Spirometry: FEV1/FVC 82%, FVC 65%, FEV1 72%, TLC 75%, DLCO 42%  March 2023, FVC 68%, FEV1 78%, TLC 62%, DLCO 48%,   August 2022 FVC 61%, FEV1 69%, TLC 73%, DLCO 44% which is not significantly changed from   December 2021 shows FVC 55% improving to 69% after bronchodilators, FEV1 54% and improving to 76% after bronchodilators, TLC 77%, DLCO 47%     Was taking Trelegy 1 inhalation once a day: But she did not notice much difference .     Discussed with the patient that since there has not been significant progression in the fibrosis in the last year i.e. less than 10% then we will continue to monitor that with the expectation that immunomodulation with mycophenolate should stabilize the lung disease.     Pulmonary hypertension: TON 10/2021 Elevated RV systolic pressures at 58 mmHg consistent with pulmonary hypertension seen, moderate AI and  MR: can't use Sildenafil as long as she is using Imdur and with valvular heart disease Sildenafil is not the preferred treatment..     Chronic hypoxemic respiratory failure, patient was prescribed oxygen in August 2022 for hypoxemia: Advised to keep saturation 90-95%, patient is compliant and benefiting from  therapy     CAD s/p Stent, currently on Imdur and followed by Dr Maranda ALEXANDRE Fib s/p  watchman,  hypertension, dyslipidemia, h/o bradycardia : improved after stopping BB.     ELIS: Home sleep test May 2023 AHI 18, patient could not use the CPAP machine for more than 3 hours, she could not tell a difference when she was using it and her AHI did not improve while on CPAP, currently she does not have the machine since her compliance was not enough: Will continue with the nocturnal oxygen        We will plan to follow PFTs every 12 months and CT if there is signs of progression cristina consider antifibrotic's.    Next PFTs September 2025    I personally reviewed the latest radiological study  Patient is advised to stay up-to-date on immunizations for flu, pneumococcal and COVID-19

## 2025-02-18 ENCOUNTER — HOSPITAL ENCOUNTER (OUTPATIENT)
Dept: CARDIOLOGY | Facility: HOSPITAL | Age: 80
Discharge: HOME | End: 2025-02-18
Payer: MEDICARE

## 2025-02-18 ENCOUNTER — HOSPITAL ENCOUNTER (OUTPATIENT)
Dept: CARDIOLOGY | Facility: HOSPITAL | Age: 80
Discharge: HOME | End: 2025-02-18
Admitting: INTERNAL MEDICINE
Payer: MEDICARE

## 2025-02-18 VITALS
WEIGHT: 193 LBS | SYSTOLIC BLOOD PRESSURE: 163 MMHG | BODY MASS INDEX: 34.2 KG/M2 | HEIGHT: 63 IN | DIASTOLIC BLOOD PRESSURE: 67 MMHG

## 2025-02-18 DIAGNOSIS — I10 ESSENTIAL HYPERTENSION: ICD-10-CM

## 2025-02-18 DIAGNOSIS — E66.9 OBESITY (BMI 30-39.9): ICD-10-CM

## 2025-02-18 DIAGNOSIS — E78.5 DYSLIPIDEMIA: ICD-10-CM

## 2025-02-18 DIAGNOSIS — I48.0 PAROXYSMAL ATRIAL FIBRILLATION: ICD-10-CM

## 2025-02-18 DIAGNOSIS — R06.09 DYSPNEA ON EXERTION: ICD-10-CM

## 2025-02-18 DIAGNOSIS — I25.10 CAD S/P PERCUTANEOUS CORONARY ANGIOPLASTY: ICD-10-CM

## 2025-02-18 DIAGNOSIS — Z98.61 CAD S/P PERCUTANEOUS CORONARY ANGIOPLASTY: ICD-10-CM

## 2025-02-18 DIAGNOSIS — Z95.818 PRESENCE OF WATCHMAN LEFT ATRIAL APPENDAGE CLOSURE DEVICE: ICD-10-CM

## 2025-02-18 DIAGNOSIS — Z95.0 PACEMAKER: ICD-10-CM

## 2025-02-18 DIAGNOSIS — R07.2 PRECORDIAL PAIN: ICD-10-CM

## 2025-02-18 LAB
AV MEAN PRESS GRAD SYS DOP V1V2: 4 MMHG
AV VMAX SYS DOP: 138.2 CM/SEC
BH CV ECHO LEFT VENTRICLE GLOBAL LONGITUDINAL STRAIN: -16.8 %
BH CV ECHO MEAS - AI P1/2T: 661.3 MSEC
BH CV ECHO MEAS - AO MAX PG: 7.6 MMHG
BH CV ECHO MEAS - AO ROOT DIAM: 2.7 CM
BH CV ECHO MEAS - AO V2 VTI: 31 CM
BH CV ECHO MEAS - AVA(I,D): 2.1 CM2
BH CV ECHO MEAS - EDV(CUBED): 103.5 ML
BH CV ECHO MEAS - EDV(MOD-SP2): 58 ML
BH CV ECHO MEAS - EDV(MOD-SP4): 60 ML
BH CV ECHO MEAS - EF(MOD-SP2): 56 %
BH CV ECHO MEAS - EF(MOD-SP4): 53.7 %
BH CV ECHO MEAS - ESV(CUBED): 37.6 ML
BH CV ECHO MEAS - ESV(MOD-SP2): 25.5 ML
BH CV ECHO MEAS - ESV(MOD-SP4): 27.7 ML
BH CV ECHO MEAS - FS: 28.7 %
BH CV ECHO MEAS - IVS/LVPW: 1.01 CM
BH CV ECHO MEAS - IVSD: 1.01 CM
BH CV ECHO MEAS - LA DIMENSION: 4.1 CM
BH CV ECHO MEAS - LAT PEAK E' VEL: 10.2 CM/SEC
BH CV ECHO MEAS - LV MASS(C)D: 165.7 GRAMS
BH CV ECHO MEAS - LV MAX PG: 3.2 MMHG
BH CV ECHO MEAS - LV MEAN PG: 1.62 MMHG
BH CV ECHO MEAS - LV V1 MAX: 90.1 CM/SEC
BH CV ECHO MEAS - LV V1 VTI: 21.6 CM
BH CV ECHO MEAS - LVIDD: 4.7 CM
BH CV ECHO MEAS - LVIDS: 3.3 CM
BH CV ECHO MEAS - LVOT AREA: 3 CM2
BH CV ECHO MEAS - LVOT DIAM: 1.96 CM
BH CV ECHO MEAS - LVPWD: 1 CM
BH CV ECHO MEAS - MED PEAK E' VEL: 6.7 CM/SEC
BH CV ECHO MEAS - MR MAX PG: 152.2 MMHG
BH CV ECHO MEAS - MR MAX VEL: 616.7 CM/SEC
BH CV ECHO MEAS - MR MEAN PG: 107.7 MMHG
BH CV ECHO MEAS - MR MEAN VEL: 490.7 CM/SEC
BH CV ECHO MEAS - MR VTI: 241.1 CM
BH CV ECHO MEAS - MV A DUR: 0.15 SEC
BH CV ECHO MEAS - MV A MAX VEL: 41.2 CM/SEC
BH CV ECHO MEAS - MV DEC SLOPE: 475.9 CM/SEC2
BH CV ECHO MEAS - MV DEC TIME: 0.2 SEC
BH CV ECHO MEAS - MV E MAX VEL: 95.2 CM/SEC
BH CV ECHO MEAS - MV E/A: 2.31
BH CV ECHO MEAS - MV MAX PG: 8.4 MMHG
BH CV ECHO MEAS - MV MEAN PG: 1.92 MMHG
BH CV ECHO MEAS - MV V2 VTI: 30.1 CM
BH CV ECHO MEAS - MVA(VTI): 2.16 CM2
BH CV ECHO MEAS - PA ACC TIME: 0.15 SEC
BH CV ECHO MEAS - PA V2 MAX: 86.7 CM/SEC
BH CV ECHO MEAS - PULM A REVS DUR: 0.11 SEC
BH CV ECHO MEAS - PULM A REVS VEL: 27.4 CM/SEC
BH CV ECHO MEAS - PULM DIAS VEL: 85.3 CM/SEC
BH CV ECHO MEAS - PULM S/D: 0.71
BH CV ECHO MEAS - PULM SYS VEL: 60.7 CM/SEC
BH CV ECHO MEAS - RAP SYSTOLE: 8 MMHG
BH CV ECHO MEAS - RV MAX PG: 1.6 MMHG
BH CV ECHO MEAS - RV V1 MAX: 63.3 CM/SEC
BH CV ECHO MEAS - RV V1 VTI: 17.9 CM
BH CV ECHO MEAS - RVDD: 2.9 CM
BH CV ECHO MEAS - RVSP: 41.8 MMHG
BH CV ECHO MEAS - SV(LVOT): 65.1 ML
BH CV ECHO MEAS - SV(MOD-SP2): 32.5 ML
BH CV ECHO MEAS - SV(MOD-SP4): 32.2 ML
BH CV ECHO MEAS - TAPSE (>1.6): 2.6 CM
BH CV ECHO MEAS - TR MAX PG: 33.8 MMHG
BH CV ECHO MEAS - TR MAX VEL: 290.5 CM/SEC
BH CV ECHO MEASUREMENTS AVERAGE E/E' RATIO: 11.27
BH CV REST NUCLEAR ISOTOPE DOSE: 10.8 MCI
BH CV STRESS BP STAGE 1: NORMAL
BH CV STRESS COMMENTS STAGE 1: NORMAL
BH CV STRESS DOSE REGADENOSON STAGE 1: 0.4
BH CV STRESS DURATION MIN STAGE 1: 0
BH CV STRESS DURATION SEC STAGE 1: 10
BH CV STRESS HR STAGE 1: 66
BH CV STRESS NUCLEAR ISOTOPE DOSE: 31.1 MCI
BH CV STRESS PROTOCOL 1: NORMAL
BH CV STRESS RECOVERY BP: NORMAL MMHG
BH CV STRESS RECOVERY HR: 75 BPM
BH CV STRESS STAGE 1: 1
LV EF BIPLANE MOD: 54 %
MAXIMAL PREDICTED HEART RATE: 140 BPM
SPECT HRT GATED+EF W RNC IV: 72 %
STRESS BASELINE BP: NORMAL MMHG
STRESS BASELINE HR: 66 BPM
STRESS TARGET HR: 119 BPM

## 2025-02-18 PROCEDURE — 93306 TTE W/DOPPLER COMPLETE: CPT | Performed by: INTERNAL MEDICINE

## 2025-02-18 PROCEDURE — 78452 HT MUSCLE IMAGE SPECT MULT: CPT

## 2025-02-18 PROCEDURE — 93306 TTE W/DOPPLER COMPLETE: CPT

## 2025-02-18 PROCEDURE — 34310000005 TECHNETIUM SESTAMIBI: Performed by: INTERNAL MEDICINE

## 2025-02-18 PROCEDURE — 93017 CV STRESS TEST TRACING ONLY: CPT

## 2025-02-18 PROCEDURE — A9500 TC99M SESTAMIBI: HCPCS | Performed by: INTERNAL MEDICINE

## 2025-02-18 PROCEDURE — 93356 MYOCRD STRAIN IMG SPCKL TRCK: CPT | Performed by: INTERNAL MEDICINE

## 2025-02-18 PROCEDURE — 93018 CV STRESS TEST I&R ONLY: CPT | Performed by: INTERNAL MEDICINE

## 2025-02-18 PROCEDURE — 78452 HT MUSCLE IMAGE SPECT MULT: CPT | Performed by: INTERNAL MEDICINE

## 2025-02-18 PROCEDURE — 93356 MYOCRD STRAIN IMG SPCKL TRCK: CPT

## 2025-02-18 PROCEDURE — 93016 CV STRESS TEST SUPVJ ONLY: CPT | Performed by: NURSE PRACTITIONER

## 2025-02-18 PROCEDURE — 25010000002 REGADENOSON 0.4 MG/5ML SOLUTION: Performed by: INTERNAL MEDICINE

## 2025-02-18 RX ORDER — REGADENOSON 0.08 MG/ML
0.4 INJECTION, SOLUTION INTRAVENOUS
Status: COMPLETED | OUTPATIENT
Start: 2025-02-18 | End: 2025-02-18

## 2025-02-18 RX ADMIN — REGADENOSON 0.4 MG: 0.08 INJECTION, SOLUTION INTRAVENOUS at 10:44

## 2025-02-18 RX ADMIN — TECHNETIUM TC 99M SESTAMIBI 1 DOSE: 1 INJECTION INTRAVENOUS at 10:45

## 2025-02-18 RX ADMIN — TECHNETIUM TC 99M SESTAMIBI 1 DOSE: 1 INJECTION INTRAVENOUS at 08:47

## 2025-02-19 ENCOUNTER — TELEPHONE (OUTPATIENT)
Dept: CARDIOLOGY | Facility: CLINIC | Age: 80
End: 2025-02-19
Payer: MEDICARE

## 2025-02-19 NOTE — TELEPHONE ENCOUNTER
"Please  let patient know stress test is normal     Echocardiogram showed normal heart function with moderate mitral regurgitation \"leaky valve\", it was previously mild.       We will continue medical management.       "

## 2025-02-21 ENCOUNTER — TELEPHONE (OUTPATIENT)
Dept: CARDIOLOGY | Facility: CLINIC | Age: 80
End: 2025-02-21
Payer: MEDICARE

## 2025-02-21 NOTE — TELEPHONE ENCOUNTER
2/20/25 on remote monitor report, patient had 7 episodes of A Fib lasting 34 seconds to 45 minutes 32 seconds with ventricular rates 140 to 192 bpm. Patient had a Watchman for A Fib. See Sumi ceballos.

## 2025-02-24 RX ORDER — METOPROLOL SUCCINATE 50 MG/1
50 TABLET, EXTENDED RELEASE ORAL 2 TIMES DAILY
Start: 2025-02-24

## 2025-02-24 NOTE — TELEPHONE ENCOUNTER
Spoke to patient, explained her test results and echo. Answered questions as well. Patient to monitor her blood pressure and bring readings with her at 4/8 visit.

## 2025-02-24 NOTE — TELEPHONE ENCOUNTER
Hub staff attempted to follow warm transfer process and was unsuccessful     Caller: PATIENT    Relationship to patient:     Best call back number: [phone]    Patient is needing: PATIENT WAS RETURNING A CALL FROM OFFICE. PATIENT STATED THAT SHE HAD TESTING DONE AND SHE IS NOT SURE WHAT THE CALL WAS ABOUT. PLEASE CONTACT PATIENT TO ADVISE. THANK YOU.

## 2025-02-24 NOTE — TELEPHONE ENCOUNTER
Spoke to patient, advised her of increased heart rates with atrial fibrillation. Advised her to increase Metoprolol to 50 mg BID and monitor her blood pressure. She is to call office if BP drops below 120/  60. She is to bring readings to her OV 4/8/25.

## 2025-03-11 ENCOUNTER — TELEPHONE (OUTPATIENT)
Dept: CARDIOLOGY | Facility: CLINIC | Age: 80
End: 2025-03-11
Payer: MEDICARE

## 2025-03-11 NOTE — TELEPHONE ENCOUNTER
Spoke to patient, advised her to watch for SOA or chest pain. If she feels ok, she can do what she normally does. Try to make sure she stays hydrated. A fib comes and goes and we cannot do much to stop it or control it other than medications for rate control.

## 2025-03-25 ENCOUNTER — TRANSCRIBE ORDERS (OUTPATIENT)
Dept: ADMINISTRATIVE | Facility: HOSPITAL | Age: 80
End: 2025-03-25
Payer: MEDICARE

## 2025-03-25 DIAGNOSIS — M81.0 AGE-RELATED OSTEOPOROSIS WITHOUT CURRENT PATHOLOGICAL FRACTURE: Primary | ICD-10-CM

## 2025-04-07 ENCOUNTER — HOSPITAL ENCOUNTER (OUTPATIENT)
Dept: BONE DENSITY | Facility: HOSPITAL | Age: 80
Discharge: HOME | End: 2025-04-07
Admitting: INTERNAL MEDICINE
Payer: MEDICARE

## 2025-04-07 DIAGNOSIS — M81.0 AGE-RELATED OSTEOPOROSIS WITHOUT CURRENT PATHOLOGICAL FRACTURE: ICD-10-CM

## 2025-04-07 PROCEDURE — 77080 DXA BONE DENSITY AXIAL: CPT

## 2025-04-08 ENCOUNTER — CLINICAL SUPPORT NO REQUIREMENTS (OUTPATIENT)
Dept: CARDIOLOGY | Facility: CLINIC | Age: 80
End: 2025-04-08
Payer: MEDICARE

## 2025-04-08 ENCOUNTER — OFFICE VISIT (OUTPATIENT)
Dept: CARDIOLOGY | Facility: CLINIC | Age: 80
End: 2025-04-08
Payer: MEDICARE

## 2025-04-08 VITALS
HEART RATE: 57 BPM | WEIGHT: 199 LBS | SYSTOLIC BLOOD PRESSURE: 133 MMHG | DIASTOLIC BLOOD PRESSURE: 73 MMHG | OXYGEN SATURATION: 96 % | BODY MASS INDEX: 35.26 KG/M2 | HEIGHT: 63 IN

## 2025-04-08 DIAGNOSIS — R06.09 DOE (DYSPNEA ON EXERTION): Primary | ICD-10-CM

## 2025-04-08 DIAGNOSIS — I35.1 NONRHEUMATIC AORTIC VALVE INSUFFICIENCY: ICD-10-CM

## 2025-04-08 DIAGNOSIS — I25.10 CAD S/P PERCUTANEOUS CORONARY ANGIOPLASTY: ICD-10-CM

## 2025-04-08 DIAGNOSIS — Z95.0 PACEMAKER: ICD-10-CM

## 2025-04-08 DIAGNOSIS — I48.0 PAROXYSMAL ATRIAL FIBRILLATION: ICD-10-CM

## 2025-04-08 DIAGNOSIS — E78.5 DYSLIPIDEMIA: ICD-10-CM

## 2025-04-08 DIAGNOSIS — Z98.61 CAD S/P PERCUTANEOUS CORONARY ANGIOPLASTY: ICD-10-CM

## 2025-04-08 DIAGNOSIS — I49.5 SICK SINUS SYNDROME: Chronic | ICD-10-CM

## 2025-04-08 DIAGNOSIS — Z95.818 PRESENCE OF WATCHMAN LEFT ATRIAL APPENDAGE CLOSURE DEVICE: ICD-10-CM

## 2025-04-08 DIAGNOSIS — R00.1 BRADYCARDIA: Primary | Chronic | ICD-10-CM

## 2025-04-08 DIAGNOSIS — E66.9 OBESITY (BMI 30-39.9): ICD-10-CM

## 2025-04-08 DIAGNOSIS — I34.0 NONRHEUMATIC MITRAL VALVE REGURGITATION: ICD-10-CM

## 2025-04-08 DIAGNOSIS — I27.20 PULMONARY HYPERTENSION: ICD-10-CM

## 2025-04-08 DIAGNOSIS — I10 ESSENTIAL HYPERTENSION: ICD-10-CM

## 2025-04-08 RX ORDER — ISOSORBIDE MONONITRATE 60 MG/1
60 TABLET, EXTENDED RELEASE ORAL EVERY MORNING
Qty: 90 TABLET | Refills: 3 | Status: SHIPPED | OUTPATIENT
Start: 2025-04-08

## 2025-04-08 RX ORDER — LOSARTAN POTASSIUM 25 MG/1
25 TABLET ORAL DAILY
Qty: 90 TABLET | Refills: 3 | Status: SHIPPED | OUTPATIENT
Start: 2025-04-08

## 2025-04-08 NOTE — PROGRESS NOTES
Subjective:     Encounter Date:04/08/2025      Patient ID: Ally Ivory is a 79 y.o. female.    Chief Complaint and history of present illness:     Follow-up for CAD, PCI, A. fib, watchman, pacemaker     History of Present Illness  :      Ms. Ally Ivory has PMH of     #.CAD, JUAN , cath 5/9/18 Severe disease in OM1 branch of LCX,FFR RCA 0.97  Patent stent in ostial right with moderate InStent stenosis and noncritical FFR at 0.90  Elevated  LVEDP with normal LV systolic function. cath  01/21/2019 PTCA to instent  RCA.  Repeat stent to RCA in [place] in 2019     #. P. A.FIB  , long-term anticoagulation, s/p watchman 8/19/2021, post watchman 1 year TON 10/4/21 revealed a PA systolic pressure 58 mmHg.  [id number] SCORE   [id number] Score: 7 (1/15/2025 10:44 AM)     age greater than 75, female gender, CHF, hypertension, vascular disease, diabetes  [id number] Score: 7 (3/6/2024  3:54 PM)  [id number] Score: 7 (9/5/2023 10:30 AM)     # SSS, Abbott dual-chamber pacemaker 4/11/2023  #.  Moderate aortic regurgitation and mitral regurgitation  #   dyslipidemia,statin allergy  #.  Orthostatic hypotension  #  hypertension, positive family history, obesity, hypothyroidism.  #  history of lupus, thrombocytopenia, pernicious anemia. psoriasis, Raynaud's, Sjogren's, RA  #.  Interstitial lung disease, PFTs 12/2021 FVC 55%-improved to 69 after bronchodilators, FEV1 54% improved to 76%, TLC 77%, DLCO 47%-/10 Becca Chan MD  #.  Pulmonary hypertension, TON 10/2021 elevated RV systolic pressure 58 mmHg  #   cholecystectomy  #.  moderate MR and -moderate AI, last echo 20 60  #  type 2 diabetes. (Patient says she is not diabetic.)        Here for   followup.  Patient denies any chest pain.  Is complaining of dyspnea on exertion especially with uphill and stress.     Patient's arterial blood pressure is 133/73, heart rate 57 bpm, O2 sat of 96% on room air...  Patient's BMI is over 30.     Patient  had labs done  08/20/2018 with rheumatology CMP was unremarkable.  Labs done 11/22/2019 revealed normal CMP, CK, cholesterol of 191, HDL 40  triglycerides 148, Labs from 11/15/2020 reveal cholesterol 95 triglycerides 90 HDL 41 LDL 36 CMP and Ck unremarkable.  Labs from 6/3/2021 revealed normal CMP, CBC with a platelet count of 114.  Labs 8/19/2021 revealed normal CBC with platelet count of 77 and Chem-7 with low calcium. Labs from 10/1/2021 revealed normal Chem-7 and CBC  CT chest with contrast done 1/26/2022 revealed symmetric groundglass opacities seen throughout all lung fields likely represent pulmonary edema or other differential include infectious and inflammatory etiologies clinical correlation recommended.  Patient had splenomegaly.       Labs from 8/5/2022 revealed normal CMP, troponin x3 and proBNP at 603.  CBC with a platelet count of 91.  Labs 4/12/2023 reveal CBC with a platelet count of 70 and BMP with a glucose of 101.  Labs from 8/5/2022 reveal normal proBNP of 603.  Labs from 1/12/2024 reveal normal CMP.  CBC with a hemoglobin of 13.5 and platelet count of 112.  Labs from 5/2/2024 reveal normal proBNP of 119.  CMP with creatinine of 1.2 and EGFR 46.  Potassium was low 3.4 repeat was 3.9.  CBC with a platelet count of 105.  Labs from 12/13/2024 reveal normal TSH at 3.9, lipid profile with cholesterol 180, triglycerides 149, HDL low at 38, .  UA without protein.  Normal CMP CBC with a platelet count of 121.  Labs from 12/28/2024 reveal normal CRP, ESR, vitamin D, CMP, CBC except platelet count of 110.  Labs from 2/11/2024 reveal normal  CMP, CBC except a platelet count of 121.  Lipid profile with cholesterol 180, triglycerides 149, HDL 38, .         ASSESSMENT:     #SSS, PPM  #Atrial fibrillation, status post watchman  #Dyslipidemia  # . chronic CHF due to diastolic dysfunction with normal LV systolic function in the past  #.  Moderate aortic regurgitation, severe pulmonary hypertension  #.    CAD ,PCI  #.  hypertension, dyslipidemia  #  PVC  #.  moderate MR and-moderate AI  #Obesity BMI over 30, pulmonary hypertension  #Thrombocytopenia         PLAN:  Patient is complaining of dyspnea will check a BNP level.  Will increase isosorbide to 60 mg a day.  Will add losartan 25 mg  Patient pacemaker check is revealing normal function  Will follow-up in pacemaker clinic.  Reviewed BMI over 30, counseled on weight loss diet and exercise.  Reviewed echo results and reviewed MR TR and pulmonary hypertension with patient.  Continue medical management with aspirin, atorvastatin, isosorbide, losartan, metoprolol to help with CAD, PCI, hypertension, dyslipidemia, pulmonary hypertension, MR and AR as tolerated     Patient has elevated [id number] score of 7 due to female gender, age over 75, hypertension, CHF, diabetes, vascular disease, will benefit from long-term anticoagulation to prevent thromboembolic events.  Patient has thrombocytopenia and is a poor candidate for long-term anticoagulation therefore underwent watchman 8/19/2021.    Follow-up with pulmonary and rheumatology.  Will check labs before visit.     Procedures:     Lexiscan Cardiolite performed 8/5/2022 reveals  EF of 66% with small sized infarct in apex.  Lexiscan Cardiolite performed 3/20/2024 reviewed/interpreted by me reveals inferolateral ischemia EF of 85%    Procedures    EKG done 1/15/2025 reviewed/interpreted by me reveals sinus rhythm with rate of 77 bpm with PVC.  Lexiscan Cardiolite 2/18/2025 is negative for ischemia EF over 70%.  Echocardiogram 2/18/2020 2025 reveals EF of 56 to 60% with mild RV E, LAE, calcified aortic valve with moderate MR and moderate TR PA pressures 35-45.    ECHOCARDIOGRAM:  Results for orders placed during the hospital encounter of 02/18/25    Adult Transthoracic Echo Complete W/ Cont if Necessary Per Protocol    Interpretation Summary    Left ventricular ejection fraction appears to be 56 - 60%.    The right  ventricular cavity is mildly dilated.    The left atrial cavity is mildly dilated.    There is calcification of the aortic valve.    Moderate mitral valve regurgitation is present.    Moderate tricuspid valve regurgitation is present.    Estimated right ventricular systolic pressure from tricuspid regurgitation is mildly elevated (35-45 mmHg).      STRESS TEST  Results for orders placed during the hospital encounter of 02/18/25    Stress Test With Myocardial Perfusion One Day    Interpretation Summary    Myocardial perfusion imaging indicates a normal myocardial perfusion study with no evidence of ischemia. Impressions are consistent with a low risk study.    Left ventricular ejection fraction is hyperdynamic (Calculated EF > 70%).          HEART CATHETERIZATION  No results found for this or any previous visit.      Copied text in this portion of the note has been reviewed and is accurate as of 4/8/2025  The following portions of the patient's history were reviewed and updated as appropriate: allergies, current medications, past family history, past medical history, past social history, past surgical history and problem list.    Assessment:         [facility]       Diagnosis Plan   1. ALLEN (dyspnea on exertion)  BNP    Lipid Panel    Comprehensive Metabolic Panel      2. Paroxysmal atrial fibrillation  BNP    Lipid Panel    Comprehensive Metabolic Panel      3. Presence of Watchman left atrial appendage closure device  BNP    Lipid Panel    Comprehensive Metabolic Panel      4. CAD S/P percutaneous coronary angioplasty  BNP    Lipid Panel    Comprehensive Metabolic Panel      5. Dyslipidemia  BNP    Lipid Panel    Comprehensive Metabolic Panel      6. Obesity (BMI 30-39.9)  BNP    Lipid Panel    Comprehensive Metabolic Panel      7. Nonrheumatic aortic valve insufficiency  BNP    Lipid Panel    Comprehensive Metabolic Panel      8. Pulmonary hypertension  BNP    Lipid Panel    Comprehensive Metabolic Panel      9.  Nonrheumatic mitral valve regurgitation  BNP    Lipid Panel    Comprehensive Metabolic Panel      10. Pacemaker  BNP    Lipid Panel    Comprehensive Metabolic Panel      11. Essential hypertension  BNP    Lipid Panel    Comprehensive Metabolic Panel             Plan:               Past Medical History:  Past Medical History:   Diagnosis Date    Atrial fibrillation     Coronary artery disease     Dyslipidemia     Hypertension     Hypothyroidism     ILD (interstitial lung disease)     Lung fibrosis     Lupus     Lupus     Raynaud's disease      Past Surgical History:  Past Surgical History:   Procedure Laterality Date    ATRIAL APPENDAGE EXCLUSION LEFT WITH TRANSESOPHAGEAL ECHOCARDIOGRAM N/A 8/19/2021    Procedure: Atrial Appendage Occlusion;  Surgeon: Eric Low MD;  Location: [facility] CATH INVASIVE LOCATION;  Service: Cardiovascular;  Laterality: N/A;    ATRIAL APPENDAGE EXCLUSION LEFT WITH TRANSESOPHAGEAL ECHOCARDIOGRAM N/A 8/19/2021    Procedure: Atrial Appendage Occlusion;  Surgeon: Francisco Garza MD;  Location: [facility] CATH INVASIVE LOCATION;  Service: Cardiovascular;  Laterality: N/A;    CARDIAC CATHETERIZATION      CARDIAC ELECTROPHYSIOLOGY PROCEDURE N/A 4/11/2023    Procedure: Pacemaker DC new  Abbott aware;  Surgeon: Eric Low MD;  Location: [facility] CATH INVASIVE LOCATION;  Service: Cardiovascular;  Laterality: N/A;    CATARACT EXTRACTION      CHOLECYSTECTOMY      ENDOSCOPY N/A 8/20/2021    Procedure: ESOPHAGOGASTRODUODENOSCOPY;  Surgeon: Joao Cross MD;  Location: [facility] ENDOSCOPY;  Service: Gastroenterology;  Laterality: N/A;  submucosal ecchymosis 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    HYSTERECTOMY      REPLACEMENT TOTAL KNEE BILATERAL        Allergies:  Allergies   Allergen Reactions    Pravastatin Rash     rash      Vancomycin Hives    Rosuvastatin Calcium GI Intolerance and Diarrhea     Home Meds:  Current Meds:     Current Outpatient Medications:  "    aspirin 81 MG EC tablet, Take 1 tablet by mouth Daily., Disp: 90 tablet, Rfl: 3    atorvastatin (LIPITOR) 10 MG tablet, Take 1 tablet every day by oral route for 30 days, for high cholesterol., Disp: , Rfl:     hydroxychloroquine (PLAQUENIL) 200 MG tablet, Take 1 tablet by mouth Daily., Disp: , Rfl:     isosorbide mononitrate (IMDUR) 60 MG 24 hr tablet, Take 1 tablet by mouth Every Morning., Disp: 90 tablet, Rfl: 3    levothyroxine (SYNTHROID, LEVOTHROID) 150 MCG tablet, Take 1 tablet by mouth Daily., Disp: , Rfl:     metoprolol succinate XL (TOPROL-XL) 50 MG 24 hr tablet, Take 1 tablet by mouth 2 (Two) Times a Day., Disp: , Rfl:     PARoxetine (PAXIL) 30 MG tablet, Take 1 tablet by mouth Daily., Disp: , Rfl:     vitamin D (ERGOCALCIFEROL) 1.25 MG (39324 UT) capsule capsule, Take 1 capsule by mouth 1 (One) Time Per Week., Disp: , Rfl:     losartan (Cozaar) 25 MG tablet, Take 1 tablet by mouth Daily., Disp: 90 tablet, Rfl: 3  Social History:   Social History     Tobacco Use    Smoking status: Never     Passive exposure: Past    Smokeless tobacco: Never   Substance Use Topics    Alcohol use: Not Currently      Family History:  Family History   Problem Relation Age of Onset    No Known Problems Mother     Lung disease Father               Review of Systems   Cardiovascular:  Negative for chest pain, leg swelling and palpitations.   Respiratory:  Negative for shortness of breath.    Neurological:  Positive for dizziness. Negative for numbness.     All other systems are negative         Objective:     Physical Exam  /73 (BP Location: Left arm, Patient Position: Sitting, Cuff Size: Large Adult)   Pulse 57   Ht 160 cm (63\")   Wt 90.3 kg (199 lb)   SpO2 96%   BMI 35.25 kg/m²   General:  Appears in no acute distress  Eyes: Sclera is anicteric,  conjunctiva is clear   HEENT:  No JVD.  No carotid bruits  Respiratory: Respirations regular and unlabored at rest.  Clear to auscultation  Cardiovascular: S1,S2 " "Regular rate and rhythm. .   Extremities: No digital clubbing or cyanosis, no edema  Skin: Color pink. Skin warm and dry to touch. No rashes  No xanthoma  Neuro: Alert and awake.    Lab Reviewed:         Eric Low MD  4/8/2025 10:53 EDT      EMR Dragon/Transcription:   \"Dictated utilizing Dragon dictation\".        "

## 2025-06-24 NOTE — PROGRESS NOTES
"                         HEMATOLOGY ONCOLOGY OUTPATIENT CONSULTATION       Patient name: Ally Ivory  : 1945  MRN: [id number]  Primary Care Physician: Gael Vasquez MD  Referring Physician: Serenity Crum APRN  Reason For Consult: Thrombocytopenia.    History of Present Illness:    2025: I was asked to see Ms. Ivory who has a long history of thrombocytopenia.  I can see back as far as , when she already had some thrombocytopenia but she reports that it dated back to further than .  She had never had any treatment and never had any manifestations of this.  She did carry a history of systemic lupus erythematosus and was taking medication for it.  However she had not had any exacerbation of her condition in the recent past.  She reported feeling well and being as active as usual.  Her spouse passed away close to the time of this encounter and she seemed to have lost her memory.  \"I think he took my brain with him\".  She had been afebrile.  No bleeding.  No unintended weight loss.  Appetite good.  Generally energetic and without dyspnea or chest pains.  Not coughing more than usual and no dysphagia.  No abdominal pain or diarrhea.  Denied early satiety.  No dysuria or hematuria.  Exam alert and conversant.  Seems a stated age.  No distress.  No jaundice.  Lungs clear bilaterally and heart regular.  Abdomen soft and without hepatomegaly or splenomegaly.  No palpable lymphadenopathy in the neck, supraclavicular regions, axillary spaces or inguinal spaces.  No edema.  Laboratory exams reviewed.  Indeed she has thrombocytopenia but in comparison to previous measurements there has been very little change.  I will monitor closely and will obtain additional laboratory exams to see if a cause could be determined.  She probably has chronic immune thrombocytopenic purpura.  Discussed with her.    Past Medical History:   Diagnosis Date    Atrial fibrillation     Coronary artery disease     " Dyslipidemia     Hypertension     Hypothyroidism     ILD (interstitial lung disease)     Lung fibrosis     Lupus     Lupus     Raynaud's disease      Past Surgical History:   Procedure Laterality Date    ATRIAL APPENDAGE EXCLUSION LEFT WITH TRANSESOPHAGEAL ECHOCARDIOGRAM N/A 8/19/2021    Procedure: Atrial Appendage Occlusion;  Surgeon: Eric Low MD;  Location: [facility] CATH INVASIVE LOCATION;  Service: Cardiovascular;  Laterality: N/A;    ATRIAL APPENDAGE EXCLUSION LEFT WITH TRANSESOPHAGEAL ECHOCARDIOGRAM N/A 8/19/2021    Procedure: Atrial Appendage Occlusion;  Surgeon: Francisco Garza MD;  Location: [facility] CATH INVASIVE LOCATION;  Service: Cardiovascular;  Laterality: N/A;    CARDIAC CATHETERIZATION      CARDIAC ELECTROPHYSIOLOGY PROCEDURE N/A 4/11/2023    Procedure: Pacemaker DC new  Abbott aware;  Surgeon: Eric Low MD;  Location: [facility] CATH INVASIVE LOCATION;  Service: Cardiovascular;  Laterality: N/A;    CATARACT EXTRACTION      CHOLECYSTECTOMY      ENDOSCOPY N/A 8/20/2021    Procedure: ESOPHAGOGASTRODUODENOSCOPY;  Surgeon: Joao Cross MD;  Location: [facility] ENDOSCOPY;  Service: Gastroenterology;  Laterality: N/A;  submucosal ecchymosis 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    HYSTERECTOMY      REPLACEMENT TOTAL KNEE BILATERAL         Current Outpatient Medications:     aspirin 81 MG EC tablet, Take 1 tablet by mouth Daily., Disp: 90 tablet, Rfl: 3    atorvastatin (LIPITOR) 10 MG tablet, Take 1 tablet every day by oral route for 30 days, for high cholesterol., Disp: , Rfl:     hydroxychloroquine (PLAQUENIL) 200 MG tablet, Take 1 tablet by mouth Daily., Disp: , Rfl:     isosorbide mononitrate (IMDUR) 60 MG 24 hr tablet, Take 1 tablet by mouth Every Morning., Disp: 90 tablet, Rfl: 3    levothyroxine (SYNTHROID, LEVOTHROID) 150 MCG tablet, Take 1 tablet by mouth Daily., Disp: , Rfl:     losartan (Cozaar) 25 MG tablet, Take 1 tablet by mouth Daily., Disp: 90  tablet, Rfl: 3    metoprolol succinate XL (TOPROL-XL) 50 MG 24 hr tablet, Take 1 tablet by mouth 2 (Two) Times a Day., Disp: , Rfl:     PARoxetine (PAXIL) 30 MG tablet, Take 1 tablet by mouth Daily., Disp: , Rfl:     vitamin D (ERGOCALCIFEROL) 1.25 MG (25188 UT) capsule capsule, Take 1 capsule by mouth 1 (One) Time Per Week., Disp: , Rfl:     Allergies   Allergen Reactions    Pravastatin Rash     rash      Vancomycin Hives    Rosuvastatin Calcium GI Intolerance and Diarrhea     Family History   Problem Relation Age of Onset    No Known Problems Mother     Lung disease Father      Cancer-related family history is not on file.    Social History     Tobacco Use    Smoking status: Never     Passive exposure: Past    Smokeless tobacco: Never   Vaping Use    Vaping status: Never Used   Substance Use Topics    Alcohol use: Not Currently    Drug use: Never     Social History     Social History Narrative    Not on file     ROS:   Review of Systems   Constitutional:  Negative for activity change, appetite change, chills, diaphoresis, fatigue, fever and unexpected weight change.   HENT:  Negative for congestion, dental problem, drooling, ear discharge, ear pain, facial swelling, hearing loss, mouth sores, nosebleeds, postnasal drip, rhinorrhea, sinus pressure, sinus pain, sneezing, sore throat, tinnitus, trouble swallowing and voice change.    Eyes:  Negative for photophobia, pain, discharge, redness, itching and visual disturbance.   Respiratory:  Negative for apnea, cough, choking, chest tightness, shortness of breath, wheezing and stridor.    Cardiovascular:  Negative for chest pain, palpitations and leg swelling.   Gastrointestinal:  Negative for abdominal distention, abdominal pain, anal bleeding, blood in stool, constipation, diarrhea, nausea, rectal pain and vomiting.   Endocrine: Negative for cold intolerance, heat intolerance, polydipsia and polyuria.   Genitourinary:  Negative for decreased urine volume, difficulty  urinating, dysuria, flank pain, frequency, genital sores, hematuria and urgency.   Musculoskeletal:  Negative for arthralgias, back pain, gait problem, joint swelling, myalgias, neck pain and neck stiffness.   Skin:  Negative for color change, pallor and rash.   Neurological:  Negative for dizziness, tremors, seizures, syncope, facial asymmetry, speech difficulty, weakness, light-headedness, numbness and headaches.   Hematological:  Negative for adenopathy. Does not bruise/bleed easily.   Psychiatric/Behavioral:  Negative for agitation, behavioral problems, confusion, decreased concentration, hallucinations, self-injury, sleep disturbance and suicidal ideas. The patient is not nervous/anxious.        Objective:    Vital Signs:  There were no vitals filed for this visit.  There is no height or weight on file to calculate BMI.    ECOG Status  (0) Fully active, able to carry on all predisease performance without restriction    Physical Exam:   Physical Exam  Constitutional:       General: She is not in acute distress.     Appearance: She is not ill-appearing, toxic-appearing or diaphoretic.   HENT:      Head: Normocephalic and atraumatic.      Right Ear: External ear normal.      Left Ear: External ear normal.      Nose: Nose normal.      Mouth/Throat:      Mouth: Mucous membranes are moist.      Pharynx: Oropharynx is clear. No oropharyngeal exudate or posterior oropharyngeal erythema.   Eyes:      General: No scleral icterus.        Right eye: No discharge.         Left eye: No discharge.      Conjunctiva/sclera: Conjunctivae normal.      Pupils: Pupils are equal, round, and reactive to light.   Cardiovascular:      Rate and Rhythm: Normal rate and regular rhythm.      Pulses: Normal pulses.      Heart sounds: No murmur heard.     No friction rub. No gallop.   Pulmonary:      Effort: No respiratory distress.      Breath sounds: No stridor. No wheezing, rhonchi or rales.   Abdominal:      General: Bowel sounds are  normal. There is no distension.      Palpations: Abdomen is soft. There is no mass.      Tenderness: There is no abdominal tenderness. There is no right CVA tenderness, left CVA tenderness, guarding or rebound.      Hernia: No hernia is present.      Comments: Rounded, protuberant and soft.  The liver and spleen do not seem enlarged.   Musculoskeletal:         General: No tenderness, deformity or signs of injury.      Cervical back: No rigidity.      Right lower leg: No edema.      Left lower leg: No edema.   Lymphadenopathy:      Cervical: No cervical adenopathy.   Skin:     Coloration: Skin is not jaundiced or pale.      Findings: No bruising, lesion or rash.   Neurological:      General: No focal deficit present.      Mental Status: She is alert and oriented to person, place, and time.      Cranial Nerves: No cranial nerve deficit.   Psychiatric:         Mood and Affect: Mood normal.         Behavior: Behavior normal.         Thought Content: Thought content normal.         Judgment: Judgment normal.     LEIGH Estrada MD performed the physical exam on 6/26/2025 as documented above.    Lab Results - Last 18 Months   Lab Units 05/04/24  0702 05/03/24  0559 05/02/24  1818   WBC 10*3/mm3 6.28 4.19 6.37   HEMOGLOBIN g/dL 13.0 13.1 13.6   HEMATOCRIT % 41.3 40.6 41.4   PLATELETS 10*3/mm3 115* 106* 105*   MCV fL 94.1 91.9 91.4     Lab Results - Last 18 Months   Lab Units 05/04/24  0702 05/03/24  0559 05/02/24  1818 01/12/24  1206   SODIUM mmol/L 143 141 138 140   POTASSIUM mmol/L 3.9 4.6 3.4* 4.5   CHLORIDE mmol/L 105 105 100 102   CO2 mmol/L 27.0 27.0 25.0 25.4   BUN mg/dL 16 15 17 15   CREATININE mg/dL 0.89 0.84 1.20* 0.87   CALCIUM mg/dL 9.2 9.5 9.7 9.3   BILIRUBIN mg/dL  --   --  0.8 0.9   ALK PHOS U/L  --   --  82 75   ALT (SGPT) U/L  --   --  13 12   AST (SGOT) U/L  --   --  20 21   GLUCOSE mg/dL 82 156* 95 91     Lab Results   Component Value Date    GLUCOSE 82 05/04/2024    BUN 16 05/04/2024    CREATININE  0.89 05/04/2024    EGFRIFNONA 74 10/01/2021    EGFRIFAFRI 76 04/10/2017    BCR 18.0 05/04/2024    K 3.9 05/04/2024    CO2 27.0 05/04/2024    CALCIUM 9.2 05/04/2024    ALBUMIN 4.5 05/02/2024    AST 20 05/02/2024    ALT 13 05/02/2024     Lab Results   Component Value Date    SELENA Negative 03/15/2022    SEDRATE 2 03/15/2022     Lab Results   Component Value Date    PTT 29.6 10/01/2021    INR 1.11 (H) 04/11/2023     Lab Results   Component Value Date    SEDRATE 2 03/15/2022      Assessment & Plan     1.  Chronic thrombocytopenia: This dates back to at least 2020 but maybe even before.  Over the course of the last several years there has been no modification in her counts that remain more or less along the same number.  No obvious explanation with the information that I obtained.  Will investigate further but she probably has chronic immune thrombocytopenic purpura.  Discussed with her.  2.  Reviewed all records including those from primary care and rheumatology.  Reviewed the laboratory exams and discussed with her.  3.  See me in a few weeks with results.    Armond Estrada MD on 6/26/2025 at 11:47 AM.

## 2025-06-26 ENCOUNTER — CONSULT (OUTPATIENT)
Dept: ONCOLOGY | Facility: CLINIC | Age: 80
End: 2025-06-26
Payer: MEDICARE

## 2025-06-26 ENCOUNTER — LAB (OUTPATIENT)
Dept: LAB | Facility: HOSPITAL | Age: 80
End: 2025-06-26
Payer: MEDICARE

## 2025-06-26 VITALS
TEMPERATURE: 97.4 F | RESPIRATION RATE: 14 BRPM | OXYGEN SATURATION: 94 % | HEART RATE: 60 BPM | WEIGHT: 202 LBS | SYSTOLIC BLOOD PRESSURE: 140 MMHG | BODY MASS INDEX: 35.79 KG/M2 | HEIGHT: 63 IN | DIASTOLIC BLOOD PRESSURE: 83 MMHG

## 2025-06-26 DIAGNOSIS — D69.6 THROMBOCYTOPENIA: Primary | ICD-10-CM

## 2025-06-26 LAB
ALBUMIN SERPL-MCNC: 4.6 G/DL (ref 3.5–5.2)
ALBUMIN/GLOB SERPL: 1.8 G/DL
ALP SERPL-CCNC: 66 U/L (ref 39–117)
ALT SERPL W P-5'-P-CCNC: 12 U/L (ref 1–33)
ANION GAP SERPL CALCULATED.3IONS-SCNC: 10.2 MMOL/L (ref 5–15)
AST SERPL-CCNC: 25 U/L (ref 1–32)
BASOPHILS # BLD AUTO: 0.01 10*3/MM3 (ref 0–0.2)
BASOPHILS NFR BLD AUTO: 0.2 % (ref 0–1.5)
BILIRUB SERPL-MCNC: 1 MG/DL (ref 0–1.2)
BUN SERPL-MCNC: 10.6 MG/DL (ref 8–23)
BUN/CREAT SERPL: 11.3 (ref 7–25)
C3 SERPL-MCNC: 113 MG/DL (ref 82–167)
C4 SERPL-MCNC: 14 MG/DL (ref 14–44)
CALCIUM SPEC-SCNC: 9.4 MG/DL (ref 8.6–10.5)
CHLORIDE SERPL-SCNC: 104 MMOL/L (ref 98–107)
CO2 SERPL-SCNC: 26.8 MMOL/L (ref 22–29)
CREAT SERPL-MCNC: 0.94 MG/DL (ref 0.57–1)
DEPRECATED RDW RBC AUTO: 45.2 FL (ref 37–54)
EGFRCR SERPLBLD CKD-EPI 2021: 61.5 ML/MIN/1.73
EOSINOPHIL # BLD AUTO: 0.19 10*3/MM3 (ref 0–0.4)
EOSINOPHIL NFR BLD AUTO: 3.4 % (ref 0.3–6.2)
ERYTHROCYTE [DISTWIDTH] IN BLOOD BY AUTOMATED COUNT: 13.8 % (ref 12.3–15.4)
GLOBULIN UR ELPH-MCNC: 2.6 GM/DL
GLUCOSE SERPL-MCNC: 86 MG/DL (ref 65–99)
HCT VFR BLD AUTO: 40.3 % (ref 34–46.6)
HCV AB SER QL: NORMAL
HGB BLD-MCNC: 13.4 G/DL (ref 12–15.9)
LYMPHOCYTES # BLD AUTO: 1.12 10*3/MM3 (ref 0.7–3.1)
LYMPHOCYTES NFR BLD AUTO: 20 % (ref 19.6–45.3)
MCH RBC QN AUTO: 31.5 PG (ref 26.6–33)
MCHC RBC AUTO-ENTMCNC: 33.3 G/DL (ref 31.5–35.7)
MCV RBC AUTO: 94.6 FL (ref 79–97)
MONOCYTES # BLD AUTO: 0.5 10*3/MM3 (ref 0.1–0.9)
MONOCYTES NFR BLD AUTO: 8.9 % (ref 5–12)
NEUTROPHILS NFR BLD AUTO: 3.79 10*3/MM3 (ref 1.7–7)
NEUTROPHILS NFR BLD AUTO: 67.5 % (ref 42.7–76)
PLATELET # BLD AUTO: 87 10*3/MM3 (ref 140–450)
PMV BLD AUTO: 11.8 FL (ref 6–12)
POTASSIUM SERPL-SCNC: 4 MMOL/L (ref 3.5–5.2)
PROT SERPL-MCNC: 7.2 G/DL (ref 6–8.5)
RBC # BLD AUTO: 4.26 10*6/MM3 (ref 3.77–5.28)
SODIUM SERPL-SCNC: 141 MMOL/L (ref 136–145)
WBC NRBC COR # BLD AUTO: 5.61 10*3/MM3 (ref 3.4–10.8)

## 2025-06-26 PROCEDURE — 85025 COMPLETE CBC W/AUTO DIFF WBC: CPT | Performed by: INTERNAL MEDICINE

## 2025-06-26 PROCEDURE — 86160 COMPLEMENT ANTIGEN: CPT | Performed by: INTERNAL MEDICINE

## 2025-06-26 PROCEDURE — 1160F RVW MEDS BY RX/DR IN RCRD: CPT | Performed by: INTERNAL MEDICINE

## 2025-06-26 PROCEDURE — 36415 COLL VENOUS BLD VENIPUNCTURE: CPT | Performed by: INTERNAL MEDICINE

## 2025-06-26 PROCEDURE — 3077F SYST BP >= 140 MM HG: CPT | Performed by: INTERNAL MEDICINE

## 2025-06-26 PROCEDURE — 99204 OFFICE O/P NEW MOD 45 MIN: CPT | Performed by: INTERNAL MEDICINE

## 2025-06-26 PROCEDURE — 86803 HEPATITIS C AB TEST: CPT | Performed by: INTERNAL MEDICINE

## 2025-06-26 PROCEDURE — 1159F MED LIST DOCD IN RCRD: CPT | Performed by: INTERNAL MEDICINE

## 2025-06-26 PROCEDURE — 80053 COMPREHEN METABOLIC PANEL: CPT | Performed by: INTERNAL MEDICINE

## 2025-06-26 PROCEDURE — 1126F AMNT PAIN NOTED NONE PRSNT: CPT | Performed by: INTERNAL MEDICINE

## 2025-06-26 PROCEDURE — 3079F DIAST BP 80-89 MM HG: CPT | Performed by: INTERNAL MEDICINE

## 2025-06-27 LAB
B2 GLYCOPROT1 IGA SER-ACNC: <9 GPI IGA UNITS (ref 0–25)
B2 GLYCOPROT1 IGG SER-ACNC: <9 GPI IGG UNITS (ref 0–20)
B2 GLYCOPROT1 IGM SER-ACNC: <9 GPI IGM UNITS (ref 0–32)
CARDIOLIPIN IGG SER IA-ACNC: <9 GPL U/ML (ref 0–14)
CARDIOLIPIN IGM SER IA-ACNC: 44 MPL U/ML (ref 0–12)

## 2025-06-28 LAB
APTT SCREEN TO CONFIRM RATIO: 0.9 RATIO (ref 0–1.34)
CONFIRM APTT/NORMAL: 40.9 SEC (ref 0–47.6)
LA 2 SCREEN W REFLEX-IMP: NORMAL
SCREEN APTT: 42.3 SEC (ref 0–43.5)
SCREEN DRVVT: 42.5 SEC (ref 0–47)
THROMBIN TIME: 19.8 SEC (ref 0–23)

## 2025-07-08 NOTE — PROGRESS NOTES
"                         HEMATOLOGY ONCOLOGY OUTPATIENT FOLLOW UP       Patient name: Ally Ivory  : 1945  MRN: [id number]  Primary Care Physician: Gael Vasquez MD  Referring Physician: No ref. provider found  Reason For Consult: Thrombocytopenia.    History of Present Illness:    2025: I was asked to see Ms. Ivory who has a long history of thrombocytopenia.  I can see back as far as , when she already had some thrombocytopenia but she reports that it dated back to further than .  She had never had any treatment and never had any manifestations of this.  She did carry a history of systemic lupus erythematosus and was taking medication for it.  However she had not had any exacerbation of her condition in the recent past.  She reported feeling well and being as active as usual.  Her spouse passed away close to the time of this encounter and she seemed to have lost her memory.  \"I think he took my brain with him\".  She had been afebrile.  No bleeding.  No unintended weight loss.  Appetite good.  Generally energetic and without dyspnea or chest pains.  Not coughing more than usual and no dysphagia.  No abdominal pain or diarrhea.  Denied early satiety.  No dysuria or hematuria.  Exam alert and conversant.  Seems a stated age.  No distress.  No jaundice.  Lungs clear bilaterally and heart regular.  Abdomen soft and without hepatomegaly or splenomegaly.  No palpable lymphadenopathy in the neck, supraclavicular regions, axillary spaces or inguinal spaces.  No edema.  Laboratory exams reviewed.  Indeed she has thrombocytopenia but in comparison to previous measurements there has been very little change.  I will monitor closely and will obtain additional laboratory exams to see if a cause could be determined.  She probably has chronic immune thrombocytopenic purpura.  Discussed with her.    2025: Entirely asymptomatic today.  As energetic as usual.  Eating as well as usual although she " reports that her appetite is not very good.  No chest pains.  No cough.  Denies abdominal pain, diarrhea or dysuria.  Has been free of melena, hematochezia or hematuria.  No bleeding.  On exam alert, conversant and oriented.  No distress no jaundice.  Lungs clear bilaterally.  Heart regular.  Abdomen soft.  No edema.  Laboratory exams reviewed.  Persist with mild thrombocytopenia.  The anticardiolipin IgM is somewhat elevated in the low positive range.  To continue observation.  She is to see me in 6 months.    Past Medical History:   Diagnosis Date    Atrial fibrillation     Coronary artery disease     Dyslipidemia     Hypertension     Hypothyroidism     ILD (interstitial lung disease)     Lung fibrosis     Lupus     Raynaud's disease      Past Surgical History:   Procedure Laterality Date    ATRIAL APPENDAGE EXCLUSION LEFT WITH TRANSESOPHAGEAL ECHOCARDIOGRAM N/A 8/19/2021    Procedure: Atrial Appendage Occlusion;  Surgeon: Eric Low MD;  Location: [facility] CATH INVASIVE LOCATION;  Service: Cardiovascular;  Laterality: N/A;    ATRIAL APPENDAGE EXCLUSION LEFT WITH TRANSESOPHAGEAL ECHOCARDIOGRAM N/A 8/19/2021    Procedure: Atrial Appendage Occlusion;  Surgeon: Francisco Garza MD;  Location: [facility] CATH INVASIVE LOCATION;  Service: Cardiovascular;  Laterality: N/A;    CARDIAC CATHETERIZATION      CARDIAC ELECTROPHYSIOLOGY PROCEDURE N/A 4/11/2023    Procedure: Pacemaker DC new  Abbott aware;  Surgeon: Eric Low MD;  Location: [facility] CATH INVASIVE LOCATION;  Service: Cardiovascular;  Laterality: N/A;    CATARACT EXTRACTION      CHOLECYSTECTOMY      ENDOSCOPY N/A 8/20/2021    Procedure: ESOPHAGOGASTRODUODENOSCOPY;  Surgeon: Joao Cross MD;  Location: [facility] ENDOSCOPY;  Service: Gastroenterology;  Laterality: N/A;  submucosal ecchymosis 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    HYSTERECTOMY      REPLACEMENT TOTAL KNEE BILATERAL         Current Outpatient Medications:      aspirin 81 MG EC tablet, Take 1 tablet by mouth Daily., Disp: 90 tablet, Rfl: 3    atorvastatin (LIPITOR) 10 MG tablet, Take 1 tablet every day by oral route for 30 days, for high cholesterol., Disp: , Rfl:     hydroxychloroquine (PLAQUENIL) 200 MG tablet, Take 1 tablet by mouth Daily., Disp: , Rfl:     isosorbide mononitrate (IMDUR) 60 MG 24 hr tablet, Take 1 tablet by mouth Every Morning., Disp: 90 tablet, Rfl: 3    levothyroxine (SYNTHROID, LEVOTHROID) 150 MCG tablet, Take 1 tablet by mouth Daily., Disp: , Rfl:     losartan (Cozaar) 25 MG tablet, Take 1 tablet by mouth Daily., Disp: 90 tablet, Rfl: 3    metoprolol succinate XL (TOPROL-XL) 50 MG 24 hr tablet, Take 1 tablet by mouth 2 (Two) Times a Day., Disp: , Rfl:     PARoxetine (PAXIL) 30 MG tablet, Take 1 tablet by mouth Daily., Disp: , Rfl:     vitamin D (ERGOCALCIFEROL) 1.25 MG (20498 UT) capsule capsule, Take 1 capsule by mouth 1 (One) Time Per Week., Disp: , Rfl:     Allergies   Allergen Reactions    Pravastatin Rash     rash      Vancomycin Hives    Rosuvastatin Calcium GI Intolerance and Diarrhea     Family History   Problem Relation Age of Onset    Diverticulitis Mother          of bowel perforation.    Lung disease Father 87        Associated to tobacco smoking.     Cancer-related family history is not on file.    Social History     Tobacco Use    Smoking status: Never     Passive exposure: Past    Smokeless tobacco: Never   Vaping Use    Vaping status: Never Used   Substance Use Topics    Alcohol use: Not Currently    Drug use: Never     Social History     Social History Narrative    Not on file     ROS:   Review of Systems   Constitutional:  Negative for activity change, appetite change, chills, diaphoresis, fatigue, fever and unexpected weight change.   HENT:  Negative for congestion, dental problem, drooling, ear discharge, ear pain, facial swelling, hearing loss, mouth sores, nosebleeds, postnasal drip, rhinorrhea, sinus pressure, sinus pain,  "sneezing, sore throat, tinnitus, trouble swallowing and voice change.    Eyes:  Negative for photophobia, pain, discharge, redness, itching and visual disturbance.   Respiratory:  Negative for apnea, cough, choking, chest tightness, shortness of breath, wheezing and stridor.    Cardiovascular:  Negative for chest pain, palpitations and leg swelling.   Gastrointestinal:  Negative for abdominal distention, abdominal pain, anal bleeding, blood in stool, constipation, diarrhea, nausea, rectal pain and vomiting.   Endocrine: Negative for cold intolerance, heat intolerance, polydipsia and polyuria.   Genitourinary:  Negative for decreased urine volume, difficulty urinating, dysuria, flank pain, frequency, genital sores, hematuria and urgency.   Musculoskeletal:  Negative for arthralgias, back pain, gait problem, joint swelling, myalgias, neck pain and neck stiffness.   Skin:  Negative for color change, pallor and rash.   Neurological:  Negative for dizziness, tremors, seizures, syncope, facial asymmetry, speech difficulty, weakness, light-headedness, numbness and headaches.   Hematological:  Negative for adenopathy. Does not bruise/bleed easily.   Psychiatric/Behavioral:  Negative for agitation, behavioral problems, confusion, decreased concentration, hallucinations, self-injury, sleep disturbance and suicidal ideas. The patient is not nervous/anxious.      Objective:    Vital Signs:  Vitals:    07/14/25 1123   BP: 132/83   Pulse: 61   Resp: 19   Temp: 98.2 °F (36.8 °C)   SpO2: 96%   Weight: 91.6 kg (202 lb)   Height: 160 cm (63\")   PainSc: 0-No pain     Body mass index is 35.78 kg/m².    ECOG Status  (0) Fully active, able to carry on all predisease performance without restriction    Physical Exam:   Physical Exam  Constitutional:       General: She is not in acute distress.     Appearance: She is not ill-appearing, toxic-appearing or diaphoretic.   HENT:      Head: Normocephalic and atraumatic.      Right Ear: External " ear normal.      Left Ear: External ear normal.      Nose: Nose normal.      Mouth/Throat:      Mouth: Mucous membranes are moist.      Pharynx: Oropharynx is clear. No oropharyngeal exudate or posterior oropharyngeal erythema.   Eyes:      General: No scleral icterus.        Right eye: No discharge.         Left eye: No discharge.      Conjunctiva/sclera: Conjunctivae normal.      Pupils: Pupils are equal, round, and reactive to light.   Cardiovascular:      Rate and Rhythm: Normal rate and regular rhythm.      Pulses: Normal pulses.      Heart sounds: No murmur heard.     No friction rub. No gallop.   Pulmonary:      Effort: No respiratory distress.      Breath sounds: No stridor. No wheezing, rhonchi or rales.   Abdominal:      General: Bowel sounds are normal. There is no distension.      Palpations: Abdomen is soft. There is no mass.      Tenderness: There is no abdominal tenderness. There is no right CVA tenderness, left CVA tenderness, guarding or rebound.      Hernia: No hernia is present.      Comments: Rounded, protuberant and soft.  The liver and spleen do not seem enlarged.   Musculoskeletal:         General: No tenderness, deformity or signs of injury.      Cervical back: No rigidity.      Right lower leg: No edema.      Left lower leg: No edema.   Lymphadenopathy:      Cervical: No cervical adenopathy.   Skin:     Coloration: Skin is not jaundiced or pale.      Findings: No bruising, lesion or rash.   Neurological:      General: No focal deficit present.      Mental Status: She is alert and oriented to person, place, and time.      Cranial Nerves: No cranial nerve deficit.   Psychiatric:         Mood and Affect: Mood normal.         Behavior: Behavior normal.         Thought Content: Thought content normal.         Judgment: Judgment normal.     LEIGH Estrada MD performed the physical exam on 7/14/2025 as documented above.    Lab Results - Last 18 Months   Lab Units 07/14/25  1101 06/26/25  1007  05/04/24  0702   WBC 10*3/mm3 5.38 5.61 6.28   HEMOGLOBIN g/dL 12.9 13.4 13.0   HEMATOCRIT % 38.7 40.3 41.3   PLATELETS 10*3/mm3 107* 87* 115*   MCV fL 94.6 94.6 94.1     Lab Results - Last 18 Months   Lab Units 06/26/25  1155 05/04/24  0702 05/03/24  0559 05/02/24  1818   SODIUM mmol/L 141 143 141 138   POTASSIUM mmol/L 4.0 3.9 4.6 3.4*   CHLORIDE mmol/L 104 105 105 100   CO2 mmol/L 26.8 27.0 27.0 25.0   BUN mg/dL 10.6 16 15 17   CREATININE mg/dL 0.94 0.89 0.84 1.20*   CALCIUM mg/dL 9.4 9.2 9.5 9.7   BILIRUBIN mg/dL 1.0  --   --  0.8   ALK PHOS U/L 66  --   --  82   ALT (SGPT) U/L 12  --   --  13   AST (SGOT) U/L 25  --   --  20   GLUCOSE mg/dL 86 82 156* 95     Lab Results   Component Value Date    GLUCOSE 86 06/26/2025    BUN 10.6 06/26/2025    CREATININE 0.94 06/26/2025    EGFRIFNONA 74 10/01/2021    EGFRIFAFRI 76 04/10/2017    BCR 11.3 06/26/2025    K 4.0 06/26/2025    CO2 26.8 06/26/2025    CALCIUM 9.4 06/26/2025    ALBUMIN 4.6 06/26/2025    AST 25 06/26/2025    ALT 12 06/26/2025     Lab Results   Component Value Date    SELENA Negative 03/15/2022    SEDRATE 2 03/15/2022     Lab Results   Component Value Date    PTT 29.6 10/01/2021    INR 1.11 (H) 04/11/2023     Lab Results   Component Value Date    SEDRATE 2 03/15/2022      Assessment & Plan     1.  Chronic thrombocytopenia: This dates back to at least 2020 but maybe even before.  Over the course of the last several years there has been no modification in her counts that remain more or less along the same number.  Likely immune thrombocytopenic purpura although the anticardiolipin IgM is elevated.  Will follow.  2.  Reviewed all records and laboratory exams and discussed the results with her.  3.  To see me in approximately 6 months.    Armond Estrada MD on 7/14/2025 at 1313

## 2025-07-09 PROCEDURE — 93294 REM INTERROG EVL PM/LDLS PM: CPT | Performed by: INTERNAL MEDICINE

## 2025-07-09 PROCEDURE — 93296 REM INTERROG EVL PM/IDS: CPT | Performed by: INTERNAL MEDICINE

## 2025-07-10 LAB
MDC_IDC_MSMT_BATTERY_REMAINING_LONGEVITY: 91 MO
MDC_IDC_MSMT_BATTERY_REMAINING_PERCENTAGE: 82 %
MDC_IDC_MSMT_BATTERY_RRT_TRIGGER: 2.6
MDC_IDC_MSMT_BATTERY_STATUS: NORMAL
MDC_IDC_MSMT_BATTERY_VOLTAGE: 2.99
MDC_IDC_MSMT_LEADCHNL_RA_DTM: NORMAL
MDC_IDC_MSMT_LEADCHNL_RA_IMPEDANCE_VALUE: 380
MDC_IDC_MSMT_LEADCHNL_RA_PACING_THRESHOLD_AMPLITUDE: 0.75
MDC_IDC_MSMT_LEADCHNL_RA_PACING_THRESHOLD_POLARITY: NORMAL
MDC_IDC_MSMT_LEADCHNL_RA_PACING_THRESHOLD_PULSEWIDTH: 0.4
MDC_IDC_MSMT_LEADCHNL_RA_SENSING_INTR_AMPL: 2.9
MDC_IDC_MSMT_LEADCHNL_RV_DTM: NORMAL
MDC_IDC_MSMT_LEADCHNL_RV_IMPEDANCE_VALUE: 700
MDC_IDC_MSMT_LEADCHNL_RV_PACING_THRESHOLD_AMPLITUDE: 1
MDC_IDC_MSMT_LEADCHNL_RV_PACING_THRESHOLD_POLARITY: NORMAL
MDC_IDC_MSMT_LEADCHNL_RV_PACING_THRESHOLD_PULSEWIDTH: 1
MDC_IDC_MSMT_LEADCHNL_RV_SENSING_INTR_AMPL: 12
MDC_IDC_PG_IMPLANT_DTM: NORMAL
MDC_IDC_PG_MFG: NORMAL
MDC_IDC_PG_MODEL: NORMAL
MDC_IDC_PG_SERIAL: NORMAL
MDC_IDC_PG_TYPE: NORMAL
MDC_IDC_SESS_DTM: NORMAL
MDC_IDC_SESS_TYPE: NORMAL
MDC_IDC_SET_BRADY_AT_MODE_SWITCH_RATE: 180
MDC_IDC_SET_BRADY_LOWRATE: 60
MDC_IDC_SET_BRADY_MAX_SENSOR_RATE: 130
MDC_IDC_SET_BRADY_MAX_TRACKING_RATE: 130
MDC_IDC_SET_BRADY_MODE: NORMAL
MDC_IDC_SET_BRADY_PAV_DELAY: 200
MDC_IDC_SET_BRADY_SAV_DELAY: 150
MDC_IDC_SET_LEADCHNL_RA_PACING_AMPLITUDE: 1.75
MDC_IDC_SET_LEADCHNL_RA_PACING_POLARITY: NORMAL
MDC_IDC_SET_LEADCHNL_RA_PACING_PULSEWIDTH: 0.4
MDC_IDC_SET_LEADCHNL_RA_SENSING_POLARITY: NORMAL
MDC_IDC_SET_LEADCHNL_RA_SENSING_SENSITIVITY: 0.5
MDC_IDC_SET_LEADCHNL_RV_PACING_AMPLITUDE: 1.25
MDC_IDC_SET_LEADCHNL_RV_PACING_POLARITY: NORMAL
MDC_IDC_SET_LEADCHNL_RV_PACING_PULSEWIDTH: 1
MDC_IDC_SET_LEADCHNL_RV_SENSING_POLARITY: NORMAL
MDC_IDC_SET_LEADCHNL_RV_SENSING_SENSITIVITY: 2
MDC_IDC_STAT_AT_BURDEN_PERCENT: 1
MDC_IDC_STAT_BRADY_RA_PERCENT_PACED: 43
MDC_IDC_STAT_BRADY_RV_PERCENT_PACED: 2.4

## 2025-07-14 ENCOUNTER — LAB (OUTPATIENT)
Dept: LAB | Facility: HOSPITAL | Age: 80
End: 2025-07-14
Payer: MEDICARE

## 2025-07-14 ENCOUNTER — OFFICE VISIT (OUTPATIENT)
Dept: ONCOLOGY | Facility: CLINIC | Age: 80
End: 2025-07-14
Payer: MEDICARE

## 2025-07-14 VITALS
HEIGHT: 63 IN | OXYGEN SATURATION: 96 % | HEART RATE: 61 BPM | SYSTOLIC BLOOD PRESSURE: 132 MMHG | RESPIRATION RATE: 19 BRPM | DIASTOLIC BLOOD PRESSURE: 83 MMHG | WEIGHT: 202 LBS | BODY MASS INDEX: 35.79 KG/M2 | TEMPERATURE: 98.2 F

## 2025-07-14 DIAGNOSIS — D69.6 THROMBOCYTOPENIA: Primary | ICD-10-CM

## 2025-07-14 LAB
BASOPHILS # BLD AUTO: 0.03 10*3/MM3 (ref 0–0.2)
BASOPHILS NFR BLD AUTO: 0.6 % (ref 0–1.5)
DEPRECATED RDW RBC AUTO: 47.9 FL (ref 37–54)
EOSINOPHIL # BLD AUTO: 0.17 10*3/MM3 (ref 0–0.4)
EOSINOPHIL NFR BLD AUTO: 3.2 % (ref 0.3–6.2)
ERYTHROCYTE [DISTWIDTH] IN BLOOD BY AUTOMATED COUNT: 13.5 % (ref 12.3–15.4)
HCT VFR BLD AUTO: 38.7 % (ref 34–46.6)
HGB BLD-MCNC: 12.9 G/DL (ref 12–15.9)
HOLD SPECIMEN: NORMAL
HOLD SPECIMEN: NORMAL
IMM GRANULOCYTES # BLD AUTO: 0.01 10*3/MM3 (ref 0–0.05)
IMM GRANULOCYTES NFR BLD AUTO: 0.2 % (ref 0–0.5)
LYMPHOCYTES # BLD AUTO: 0.81 10*3/MM3 (ref 0.7–3.1)
LYMPHOCYTES NFR BLD AUTO: 15.1 % (ref 19.6–45.3)
MCH RBC QN AUTO: 31.5 PG (ref 26.6–33)
MCHC RBC AUTO-ENTMCNC: 33.3 G/DL (ref 31.5–35.7)
MCV RBC AUTO: 94.6 FL (ref 79–97)
MONOCYTES # BLD AUTO: 0.43 10*3/MM3 (ref 0.1–0.9)
MONOCYTES NFR BLD AUTO: 8 % (ref 5–12)
NEUTROPHILS NFR BLD AUTO: 3.93 10*3/MM3 (ref 1.7–7)
NEUTROPHILS NFR BLD AUTO: 72.9 % (ref 42.7–76)
PLATELET # BLD AUTO: 107 10*3/MM3 (ref 140–450)
PMV BLD AUTO: 12.7 FL (ref 6–12)
RBC # BLD AUTO: 4.09 10*6/MM3 (ref 3.77–5.28)
WBC NRBC COR # BLD AUTO: 5.38 10*3/MM3 (ref 3.4–10.8)

## 2025-07-14 PROCEDURE — 1126F AMNT PAIN NOTED NONE PRSNT: CPT | Performed by: INTERNAL MEDICINE

## 2025-07-14 PROCEDURE — 99213 OFFICE O/P EST LOW 20 MIN: CPT | Performed by: INTERNAL MEDICINE

## 2025-07-14 PROCEDURE — 1159F MED LIST DOCD IN RCRD: CPT | Performed by: INTERNAL MEDICINE

## 2025-07-14 PROCEDURE — 1160F RVW MEDS BY RX/DR IN RCRD: CPT | Performed by: INTERNAL MEDICINE

## 2025-07-14 PROCEDURE — 85025 COMPLETE CBC W/AUTO DIFF WBC: CPT | Performed by: INTERNAL MEDICINE

## 2025-07-14 PROCEDURE — 3075F SYST BP GE 130 - 139MM HG: CPT | Performed by: INTERNAL MEDICINE

## 2025-07-14 PROCEDURE — 3079F DIAST BP 80-89 MM HG: CPT | Performed by: INTERNAL MEDICINE

## 2025-07-14 PROCEDURE — 36415 COLL VENOUS BLD VENIPUNCTURE: CPT

## (undated) DEVICE — PAD E/S GRND SGL/FOIL 9FT/CORD DISP

## (undated) DEVICE — Device

## (undated) DEVICE — PAPR PRNT PK SONY W RIBN UPC55

## (undated) DEVICE — PACEMAKER CDS: Brand: MEDLINE INDUSTRIES, INC.

## (undated) DEVICE — INTRO PERFORMER CHECKFLO/LG RAD/BND NO/GW 14F .038IN 30CM

## (undated) DEVICE — SYR SLPTP 5CC

## (undated) DEVICE — STPCK 3WY HP ROT

## (undated) DEVICE — ACCESS SHEATH WITH DILATOR: Brand: WATCHMAN™ TRUSEAL™ ACCESS SYSTEM

## (undated) DEVICE — CABL BIPOL W/ALLGTR CLIP/SM 12FT

## (undated) DEVICE — INTRO SHEATH PRELUDE SNAP .038 6F 13CM W/SDPRT

## (undated) DEVICE — ELECTRD DEFIB M/FUNC PROPADZ RADIOL 2PK

## (undated) DEVICE — ANTIBACTERIAL UNDYED BRAIDED (POLYGLACTIN 910), SYNTHETIC ABSORBABLE SUTURE: Brand: COATED VICRYL

## (undated) DEVICE — REFLEX ONE, SKIN STAPLER, 35 WIDE: Brand: REFLEX

## (undated) DEVICE — SUT SILK 0 CT1 18IN 424H

## (undated) DEVICE — TBG PENCL TELESCP MEGADYNE SMOKE EVAC 10FT

## (undated) DEVICE — CATH DIAG IMPULSE PIG 5F 100CM

## (undated) DEVICE — GW XCHG AMPLTZ XSTIF PTFE CRV .035IN 3X180CM

## (undated) DEVICE — Device: Brand: NRG TRANSSEPTAL NEEDLE

## (undated) DEVICE — TBG NAMIC PRESS MONTR A/ F/M 12IN

## (undated) DEVICE — PREF GUIDING SHEATH:MULTI-SHRT: Brand: PREFACE

## (undated) DEVICE — VIOLET BRAIDED (POLYGLACTIN 910), SYNTHETIC ABSORBABLE SUTURE: Brand: COATED VICRYL

## (undated) DEVICE — 3M™ IOBAN™ 2 ANTIMICROBIAL INCISE DRAPE 6650EZ: Brand: IOBAN™ 2

## (undated) DEVICE — Device: Brand: RFP-100A CONNECTOR CABLE

## (undated) DEVICE — PK TRY HEART CATH 50

## (undated) DEVICE — PINNACLE INTRODUCER SHEATH: Brand: PINNACLE

## (undated) DEVICE — PK ENDO GI 50

## (undated) DEVICE — BITEBLOCK ENDO W/STRAP 60F A/ LF DISP